# Patient Record
Sex: FEMALE | Race: WHITE | NOT HISPANIC OR LATINO | Employment: FULL TIME | URBAN - METROPOLITAN AREA
[De-identification: names, ages, dates, MRNs, and addresses within clinical notes are randomized per-mention and may not be internally consistent; named-entity substitution may affect disease eponyms.]

---

## 2017-01-16 DIAGNOSIS — E03.4 HYPOTHYROIDISM DUE TO ACQUIRED ATROPHY OF THYROID: Primary | ICD-10-CM

## 2017-01-16 NOTE — TELEPHONE ENCOUNTER
levothyroxine (SYNTHROID, LEVOTHROID) 75 MCG tablet    Last Written Prescription Date: 10/10/16  Last Quantity: 90, # refills: 0  Last Office Visit with G, P or [facility] prescribing provider: Dr. Newman, 07/14/16        TSH   Date Value Ref Range Status   07/14/2016 4.03* 0.40 - 4.00 mU/L Final

## 2017-01-17 RX ORDER — LEVOTHYROXINE SODIUM 75 UG/1
75 TABLET ORAL DAILY
Qty: 90 TABLET | Refills: 0 | Status: SHIPPED | OUTPATIENT
Start: 2017-01-17 | End: 2017-04-14

## 2017-01-21 DIAGNOSIS — E03.4 HYPOTHYROIDISM DUE TO ACQUIRED ATROPHY OF THYROID: ICD-10-CM

## 2017-01-21 LAB — TSH SERPL DL<=0.005 MIU/L-ACNC: 2.6 MU/L (ref 0.4–4)

## 2017-01-21 PROCEDURE — 84443 ASSAY THYROID STIM HORMONE: CPT | Performed by: FAMILY MEDICINE

## 2017-01-21 PROCEDURE — 36415 COLL VENOUS BLD VENIPUNCTURE: CPT | Performed by: FAMILY MEDICINE

## 2017-04-14 DIAGNOSIS — E03.4 HYPOTHYROIDISM DUE TO ACQUIRED ATROPHY OF THYROID: ICD-10-CM

## 2017-04-14 NOTE — LETTER
[facility]  [address] [place], [address]  [phone]        April 17, 2017    Steffi Klein                                                                                                                               [address] [facility] [address]            We recently received a call from your pharmacy requesting a refill of Levothyroxine.     A review of your chart indicates that an appointment is required with your provider for yearly physical-due 7/14/17. Please call the clinic at [phone] to schedule your appointment.     We have authorized one refill-3 months of your medication to allow time for you to schedule your appointment.      Taking care of your health is important to us and ongoing visits with your provider are vital to your care.  We look forward to seeing you in the near future.     Sincerely,     [facility]

## 2017-04-15 NOTE — TELEPHONE ENCOUNTER
Pending Prescriptions:                       Disp   Refills    levothyroxine (SYNTHROID/LEVOTHROID) 75 M*90 tab*0            Sig: TAKE 1 TABLET(75 MCG) BY MOUTH DAILY             Last Written Prescription Date: 1/17/2017  Last Quantity: 90, # refills: 0  Last Office Visit with CARLOS MANUEL, JASONP or [facility] prescribing provider: 7/14/2016Trent        TSH   Date Value Ref Range Status   01/21/2017 2.60 0.40 - 4.00 mU/L Final

## 2017-04-17 RX ORDER — LEVOTHYROXINE SODIUM 75 UG/1
TABLET ORAL
Qty: 90 TABLET | Refills: 0 | Status: SHIPPED | OUTPATIENT
Start: 2017-04-17 | End: 2017-07-11

## 2017-07-11 DIAGNOSIS — E03.4 HYPOTHYROIDISM DUE TO ACQUIRED ATROPHY OF THYROID: ICD-10-CM

## 2017-07-11 DIAGNOSIS — G62.9 NEUROPATHY: ICD-10-CM

## 2017-07-11 DIAGNOSIS — L71.9 ROSACEA: ICD-10-CM

## 2017-07-11 RX ORDER — GABAPENTIN 300 MG/1
CAPSULE ORAL
Qty: 270 CAPSULE | Refills: 0 | Status: SHIPPED | OUTPATIENT
Start: 2017-07-11 | End: 2017-10-05

## 2017-07-11 RX ORDER — DOXYCYCLINE 50 MG/1
CAPSULE ORAL
Qty: 90 CAPSULE | Refills: 0 | Status: SHIPPED | OUTPATIENT
Start: 2017-07-11 | End: 2017-09-02

## 2017-07-11 RX ORDER — LEVOTHYROXINE SODIUM 75 UG/1
TABLET ORAL
Qty: 90 TABLET | Refills: 0 | Status: SHIPPED | OUTPATIENT
Start: 2017-07-11 | End: 2017-10-09

## 2017-07-12 ENCOUNTER — TELEPHONE (OUTPATIENT)
Dept: FAMILY MEDICINE | Facility: CLINIC | Age: 62
End: 2017-07-12

## 2017-07-13 NOTE — TELEPHONE ENCOUNTER
Patient returned call and set up mammogram appointment.  Please send referral for colonoscopy.      Giovanna Kaur

## 2017-07-18 ENCOUNTER — OFFICE VISIT (OUTPATIENT)
Dept: FAMILY MEDICINE | Facility: CLINIC | Age: 62
End: 2017-07-18
Payer: COMMERCIAL

## 2017-07-18 VITALS
RESPIRATION RATE: 18 BRPM | BODY MASS INDEX: 36.07 KG/M2 | SYSTOLIC BLOOD PRESSURE: 104 MMHG | TEMPERATURE: 98.3 F | OXYGEN SATURATION: 96 % | HEART RATE: 64 BPM | DIASTOLIC BLOOD PRESSURE: 72 MMHG | WEIGHT: 197.2 LBS

## 2017-07-18 DIAGNOSIS — E03.4 HYPOTHYROIDISM DUE TO ACQUIRED ATROPHY OF THYROID: ICD-10-CM

## 2017-07-18 DIAGNOSIS — H93.13 TINNITUS OF BOTH EARS: ICD-10-CM

## 2017-07-18 DIAGNOSIS — Z12.11 SPECIAL SCREENING FOR MALIGNANT NEOPLASMS, COLON: ICD-10-CM

## 2017-07-18 DIAGNOSIS — Z00.00 ROUTINE GENERAL MEDICAL EXAMINATION AT A HEALTH CARE FACILITY: Primary | ICD-10-CM

## 2017-07-18 DIAGNOSIS — B35.1 ONYCHOMYCOSIS: ICD-10-CM

## 2017-07-18 DIAGNOSIS — G62.9 NEUROPATHY: ICD-10-CM

## 2017-07-18 DIAGNOSIS — E66.09 NON MORBID OBESITY DUE TO EXCESS CALORIES: ICD-10-CM

## 2017-07-18 LAB
ALBUMIN SERPL-MCNC: 3.5 G/DL (ref 3.4–5)
ALP SERPL-CCNC: 75 U/L (ref 40–150)
ALT SERPL W P-5'-P-CCNC: 38 U/L (ref 0–50)
ANION GAP SERPL CALCULATED.3IONS-SCNC: 2 MMOL/L (ref 3–14)
AST SERPL W P-5'-P-CCNC: 23 U/L (ref 0–45)
BILIRUB SERPL-MCNC: 0.4 MG/DL (ref 0.2–1.3)
BUN SERPL-MCNC: 15 MG/DL (ref 7–30)
CALCIUM SERPL-MCNC: 8.9 MG/DL (ref 8.5–10.1)
CHLORIDE SERPL-SCNC: 109 MMOL/L (ref 94–109)
CO2 SERPL-SCNC: 32 MMOL/L (ref 20–32)
CREAT SERPL-MCNC: 0.93 MG/DL (ref 0.52–1.04)
GFR SERPL CREATININE-BSD FRML MDRD: 62 ML/MIN/1.7M2
GLUCOSE SERPL-MCNC: 93 MG/DL (ref 70–99)
POTASSIUM SERPL-SCNC: 4.2 MMOL/L (ref 3.4–5.3)
PROT SERPL-MCNC: 7.2 G/DL (ref 6.8–8.8)
SODIUM SERPL-SCNC: 143 MMOL/L (ref 133–144)
TSH SERPL DL<=0.005 MIU/L-ACNC: 2.52 MU/L (ref 0.4–4)

## 2017-07-18 PROCEDURE — 99396 PREV VISIT EST AGE 40-64: CPT | Performed by: FAMILY MEDICINE

## 2017-07-18 PROCEDURE — 84443 ASSAY THYROID STIM HORMONE: CPT | Performed by: FAMILY MEDICINE

## 2017-07-18 PROCEDURE — 80053 COMPREHEN METABOLIC PANEL: CPT | Performed by: FAMILY MEDICINE

## 2017-07-18 PROCEDURE — 36415 COLL VENOUS BLD VENIPUNCTURE: CPT | Performed by: FAMILY MEDICINE

## 2017-07-18 PROCEDURE — 99213 OFFICE O/P EST LOW 20 MIN: CPT | Mod: 25 | Performed by: FAMILY MEDICINE

## 2017-07-18 PROCEDURE — 82274 ASSAY TEST FOR BLOOD FECAL: CPT | Performed by: FAMILY MEDICINE

## 2017-07-18 RX ORDER — TERBINAFINE HYDROCHLORIDE 250 MG/1
250 TABLET ORAL DAILY
Qty: 90 TABLET | Refills: 0 | Status: SHIPPED | OUTPATIENT
Start: 2017-07-18 | End: 2017-12-14

## 2017-07-18 RX ORDER — DULOXETIN HYDROCHLORIDE 30 MG/1
30 CAPSULE, DELAYED RELEASE ORAL AT BEDTIME
Qty: 90 CAPSULE | Refills: 1 | Status: SHIPPED | OUTPATIENT
Start: 2017-07-18 | End: 2017-12-14

## 2017-07-18 RX ORDER — CALCIUM CARBONATE 500(1250)
1 TABLET ORAL 2 TIMES DAILY
COMMUNITY
End: 2018-04-20

## 2017-07-18 NOTE — MR AVS SNAPSHOT
After Visit Summary   7/18/2017    Steffi Klein    MRN: [id number]           Patient Information     Date Of Birth          1955        Visit Information        Provider Department      7/18/2017 8:00 AM Raul Newman MD [facility]        Today's Diagnoses     Routine general medical examination at a health care facility    -  1    Hypothyroidism due to acquired atrophy of thyroid        Onychomycosis        Special screening for malignant neoplasms, colon        Non morbid obesity due to excess calories        Tinnitus of both ears        Neuropathy (H)          Care Instructions      Preventive Health Recommendations  Female Ages 50 - 64    Yearly exam: See your health care provider every year in order to  o Review health changes.   o Discuss preventive care.    o Review your medicines if your doctor has prescribed any.      Get a Pap test every three years (unless you have an abnormal result and your provider advises testing more often).    If you get Pap tests with HPV test, you only need to test every 5 years, unless you have an abnormal result.     You do not need a Pap test if your uterus was removed (hysterectomy) and you have not had cancer.    You should be tested each year for STDs (sexually transmitted diseases) if you're at risk.     Have a mammogram every 1 to 2 years.    Have a colonoscopy at age 50, or have a yearly FIT test (stool test). These exams screen for colon cancer.      Have a cholesterol test every 5 years, or more often if advised.    Have a diabetes test (fasting glucose) every three years. If you are at risk for diabetes, you should have this test more often.     If you are at risk for osteoporosis (brittle bone disease), think about having a bone density scan (DEXA).    Shots: Get a flu shot each year. Get a tetanus shot every 10 years.    Nutrition:     Eat at least 5 servings of fruits and vegetables each day.    Eat whole-grain bread,  whole-wheat pasta and brown rice instead of white grains and rice.    Talk to your provider about Calcium and Vitamin D.     Lifestyle    Exercise at least 150 minutes a week (30 minutes a day, 5 days a week). This will help you control your weight and prevent disease.    Limit alcohol to one drink per day.    No smoking.     Wear sunscreen to prevent skin cancer.     See your dentist every six months for an exam and cleaning.    See your eye doctor every 1 to 2 years.    30 grams fiber (3 servings vegetables /  fruits each meal)  1 serving carbohydrate (bread potatoes) daily      Famotidine (pepcid) 20 or ranitidine(zantac) 150              Follow-ups after your visit        Your next 10 appointments already scheduled     Aug 02, 2017  7:30 AM CDT   MA SCREENING DIGITAL BILATERAL with CRMA1   [facility]    [address] [place] [id number] [phone]           Do not use any powder, lotion or deodorant under your arms or on your breast. If you do, we will ask you to remove it before your exam.  Wear comfortable, two-piece clothing.  If you have any allergies, tell your care team.  Bring any previous mammograms from other facilities or have them mailed to the breast center.              Future tests that were ordered for you today     Open Future Orders        Priority Expected Expires Ordered    Fecal colorectal cancer screen (FIT) Routine 8/8/2017 10/10/2017 7/18/2017            Who to contact     If you have questions or need follow up information about today's clinic visit or your schedule please contact [facility] directly at [phone].  Normal or non-critical lab and imaging results will be communicated to you by MyChart, letter or phone within 4 business days after the clinic has received the results. If you do not hear from us within 7 days, please contact the clinic through MyChart or phone. If you have a  critical or abnormal lab result, we will notify you by phone as soon as possible.  Submit refill requests through [employer] or call your pharmacy and they will forward the refill request to us. Please allow 3 business days for your refill to be completed.          Additional Information About Your Visit        [employer] Information     [employer] gives you secure access to your electronic health record. If you see a primary care provider, you can also send messages to your care team and make appointments. If you have questions, please call your primary care clinic.  If you do not have a primary care provider, please call [phone] and they will assist you.        Care EveryWhere ID     This is your Care EveryWhere ID. This could be used by other organizations to access your [facility] medical records  [id number]        Your Vitals Were     Pulse Temperature Respirations Last Period Pulse Oximetry BMI (Body Mass Index)    64 98.3  F (36.8  C) (Oral) 18 08/13/2007 96% 36.07 kg/m2       Blood Pressure from Last 3 Encounters:   07/18/17 104/72   07/14/16 107/71   07/13/15 118/62    Weight from Last 3 Encounters:   07/18/17 197 lb 3.2 oz (89.4 kg)   07/14/16 190 lb 9.6 oz (86.5 kg)   07/13/15 187 lb (84.8 kg)              We Performed the Following     Comprehensive metabolic panel     TSH with free T4 reflex          Today's Medication Changes          These changes are accurate as of: 7/18/17  8:42 AM.  If you have any questions, ask your nurse or doctor.               Start taking these medicines.        Dose/Directions    DULoxetine 30 MG EC capsule   Commonly known as:  CYMBALTA   Used for:  Tinnitus of both ears, Neuropathy (H)   Started by:  Raul Newman MD        Dose:  30 mg   Take 1 capsule (30 mg) by mouth At Bedtime   Quantity:  90 capsule   Refills:  1       terbinafine 250 MG tablet   Commonly known as:  lamISIL   Used for:  Onychomycosis   Started by:  Raul Newman MD        Dose:  250 mg   Take 1 tablet  (250 mg) by mouth daily   Quantity:  90 tablet   Refills:  0         Stop taking these medicines if you haven't already. Please contact your care team if you have questions.     LANsoprazole 30 MG CR capsule   Commonly known as:  PREVACID   Stopped by:  Raul Newman MD                Where to get your medicines      These medications were sent to [id number] [address]  [address], [facility] [address]     Phone:  [phone]     DULoxetine 30 MG EC capsule    terbinafine 250 MG tablet                Primary Care Provider Office Phone # Fax #    Raul Newman [phone]       [facility] [address]  [facility] [address]        Equal Access to Services     [facility] CenterRYAN : Hadii aad ku hadasho Soomaali, waaxda luqadaha, qaybta kaalmada adeegyada, waxay lilianain hayaan delilah alvarado . So [facility] [phone].    ATENCIÓN: Si habla español, tiene a gonzalez disposición servicios gratuitos de asistencia lingüística. [facility] [phone].    We comply with applicable federal civil rights laws and [place] laws. We do not discriminate on the basis of race, color, national origin, age, disability sex, sexual orientation or gender identity.            Thank you!     Thank you for choosing [facility]  for your care. Our goal is always to provide you with excellent care. Hearing back from our patients is one way we can continue to improve our services. Please take a few minutes to complete the written survey that you may receive in the mail after your visit with us. Thank you!             Your Updated Medication List - Protect others around you: Learn how to safely use, store and throw away your medicines at www.disposemymeds.org.          This list is accurate as of: 7/18/17  8:42 AM.  Always use your most recent med list.                   Brand Name Dispense Instructions for  use Diagnosis    calcium carbonate 1250 MG tablet    OS- mg [place]. Ca     Take 1 tablet by mouth 2 times daily        CERAVE Lotn      Externally apply  topically.        doxycycline Monohydrate 50 MG Caps capsule     90 capsule    TAKE 1 TO 2 CAPSULES BY MOUTH DAILY AS NEEDED    Rosacea       DULoxetine 30 MG EC capsule    CYMBALTA    90 capsule    Take 1 capsule (30 mg) by mouth At Bedtime    Tinnitus of both ears, Neuropathy (H)       gabapentin 300 MG capsule    NEURONTIN    270 capsule    TAKE 1 CAPSULE(300 MG) BY MOUTH THREE TIMES DAILY    Neuropathy (H)       levothyroxine 75 MCG tablet    SYNTHROID/LEVOTHROID    90 tablet    TAKE 1 TABLET(75 MCG) BY MOUTH DAILY    Hypothyroidism due to acquired atrophy of thyroid       pantoprazole 40 MG EC tablet    PROTONIX    90 tablet    Take 1 tablet (40 mg) by mouth daily Take 30-60 minutes before a meal.    Gastroesophageal reflux disease, esophagitis presence not specified       terbinafine 250 MG tablet    lamISIL    90 tablet    Take 1 tablet (250 mg) by mouth daily    Onychomycosis       vitamin D 1000 UNITS capsule     100 capsule    Take 1 capsule by mouth daily.        ZYRTEC 10 MG tablet   Generic drug:  cetirizine     90    ONE TABLET DAILY    Cough

## 2017-07-18 NOTE — NURSING NOTE
"Chief Complaint   Patient presents with     Physical     pt is fasting       Initial /72 (BP Location: Right arm, Patient Position: Chair, Cuff Size: Adult Regular)  Pulse 64  Temp 98.3  F (36.8  C) (Oral)  Resp 18  Wt 197 lb 3.2 oz (89.4 kg)  LMP 08/13/2007  SpO2 96%  BMI 36.07 kg/m2 Estimated body mass index is 36.07 kg/(m^2) as calculated from the following:    Height as of 7/14/16: 5' 2\" (1.575 m).    Weight as of this encounter: 197 lb 3.2 oz (89.4 kg).  Medication Reconciliation: complete   SMA Joy      "

## 2017-07-18 NOTE — PROGRESS NOTES
Tinnitus of both ears disappears on vacation  Neuropathy (H) tingling right toes, worse when gabapentin forgotten  ROS hi stress  /72 (BP Location: Right arm, Patient Position: Chair, Cuff Size: Adult Regular)  Pulse 64  Temp 98.3  F (36.8  C) (Oral)  Resp 18  Wt 197 lb 3.2 oz (89.4 kg)  LMP 08/13/2007  SpO2 96%  BMI 36.07 kg/m2  Tr edema foot  Ears clear    ASSESSMENT / PLAN:    (H93.13) Tinnitus of both ears  Comment: discussed options, jyotsna history, noise protection  Plan: DULoxetine (CYMBALTA) 30 MG EC capsule       Trial     (G62.9) Neuropathy (H)  Comment: add  Plan: Comprehensive metabolic panel, DULoxetine         (CYMBALTA) 30 MG EC capsule        2 birds one stone      RTC 4-8 weeks    Raul Newman MD

## 2017-07-18 NOTE — PATIENT INSTRUCTIONS

## 2017-07-18 NOTE — PROGRESS NOTES
SUBJECTIVE:   CC: Steffi Klein is an 62 year old woman who presents for preventive health visit.     Healthy Habits:    Do you get at least three servings of calcium containing foods daily (dairy, green leafy vegetables, etc.)? no    Amount of exercise or daily activities, outside of work: 1-2 day(s) per week    Problems taking medications regularly No    Medication side effects: No    Have you had an eye exam in the past two years? yes    Do you see a dentist twice per year? yes  Do you have sleep apnea, excessive snoring or daytime drowsiness?  yes      Routine general medical examination at a health care facility  (primary encounter diagnosis): Patient has a mammogram scheduled (July 31)    Hypothyroidism due to acquired atrophy of thyroid:  TSH   Date Value Ref Range Status   01/21/2017 2.60 0.40 - 4.00 mU/L Final   ]    Onychomycosis: The patient was wondering if she had a nail fungus on her right big toe.     Special screening for malignant neoplasms, colon: The patient is aware she is due for colon cancer screen, agreed to a FIT    Non morbid obesity due to excess calories:   Wt Readings from Last 5 Encounters:   07/18/17 89.4 kg (197 lb 3.2 oz)   07/14/16 86.5 kg (190 lb 9.6 oz)   07/13/15 84.8 kg (187 lb)   07/09/14 84.4 kg (186 lb)   06/30/14 84.4 kg (186 lb)       Today's PHQ-2 Score:   PHQ-2 ( 1999 Pfizer) 7/13/2016 7/13/2015   Q1: Little interest or pleasure in doing things - 0   Q2: Feeling down, depressed or hopeless - 0   PHQ-2 Score - 0   Q1: Little interest or pleasure in doing things Not at all -   Q2: Feeling down, depressed or hopeless Not at all -   PHQ-2 Score 0 -       Abuse: Current or Past(Physical, Sexual or Emotional)- No  Do you feel safe in your environment - Yes    Social History   Substance Use Topics     Smoking status: Former Smoker     Smokeless tobacco: Never Used      Comment: quit 1970's     Alcohol use Yes      Comment: socially     The patient does not drink >3 drinks  per day nor >7 drinks per week.    Reviewed orders with patient.  Reviewed health maintenance and updated orders accordingly - Yes      Patient over age 50, mutual decision to screen reflected in health maintenance.    Pertinent mammograms are reviewed under the imaging tab.  History of abnormal Pap smear: NO - age 30- 65 PAP every 3 years recommended    Reviewed and updated as needed this visit by clinical staff  Past Medical History:   Diagnosis Date     Anxiety 2009     CARDIOVASCULAR SCREENING; LDL GOAL LESS THAN 160 10/31/2010     Chronic pain     left wrist     Distal radius fracture 2013     Esophageal reflux 2006     Hypothyroidism 2014     Irritable bowel syndrome      Lumbago      Neuropathy (H) 2014    R foot post op      Obesity 2015     Osteoporosis      Reflux esophagitis      Restless legs syndrome (RLS)      Rosacea 2015     Squamous cell carcinoma of shoulder, left      Wrist pain 2013       Past Surgical History:   Procedure Laterality Date     C NONSPECIFIC PROCEDURE      3 NSVDs     D & C       ESOPHAGOSCOPY, GASTROSCOPY, DUODENOSCOPY (EGD), COMBINED  10/24/2014     [facility]     ESOPHAGOSCOPY, GASTROSCOPY, DUODENOSCOPY (EGD), COMBINED N/A 10/24/2014    Procedure: COMBINED ESOPHAGOSCOPY, GASTROSCOPY, DUODENOSCOPY (EGD);  Surgeon: Black Alva MD;  Location:  GI     OPEN REDUCTION INTERNAL FIXATION WRIST  12/3/2012    Procedure: OPEN REDUCTION INTERNAL FIXATION WRIST;  Left Wrist Open Reduction Internal Fixation        ORTHOPEDIC SURGERY      right ankle surgery     skin cancer excision       TONSILLECTOMY  as a child        Family History   Problem Relation Age of Onset     CANCER Father      lung and brain cancer,  at age of 69     CANCER Mother      lung cancer, surgically resected     Arthritis Mother      has had knee surgeries     Alzheimer Disease Mother      CANCER Paternal Aunt       from breast CA     Alzheimer Disease  Maternal Grandfather        Social History   Substance Use Topics     Smoking status: Former Smoker     Smokeless tobacco: Never Used      Comment: quit 1970's     Alcohol use Yes      Comment: socially       Tobacco  Allergies  Med Hx  Surg Hx  Fam Hx  Soc Hx        Reviewed and updated as needed this visit by Provider    ROS:  C: NEGATIVE for fever, chills  I: NEGATIVE for worrisome rashes, moles or lesions  E: NEGATIVE for vision changes or irritation  ENT: see additional note  R: NEGATIVE for significant cough or SOB  CV: NEGATIVE for chest pain, palpitations or peripheral edema  GI: NEGATIVE for nausea, abdominal pain, heartburn, or change in bowel habits  M: NEGATIVE for significant arthralgias or myalgia exceptright  ankle bothers her  E: NEGATIVE for hot flashes  N: see additional note      This document serves as a record of the services and decisions personally performed and made by Raul Newman MD. It was created on his behalf by Kym Estrada, a trained medical scribe.  The creation of this document is based on the scribe's personal observations and the provider's statements to the medical scribe.  Kym Estrada, July 18, 2017 8:08 AM    OBJECTIVE:   /72 (BP Location: Right arm, Patient Position: Chair, Cuff Size: Adult Regular)  Pulse 64  Temp 98.3  F (36.8  C) (Oral)  Resp 18  Wt 197 lb 3.2 oz (89.4 kg)  LMP 08/13/2007  SpO2 96%  BMI 36.07 kg/m2  EXAM:  GENERAL APPEARANCE: healthy, alert and no distress  EYES: Eyes grossly normal to inspection, PERRL and conjunctivae and sclerae normal  HENT:  nose and mouth without ulcers or lesions, oropharynx clear and oral mucous membranes moist see additional note  NECK: no adenopathy, no asymmetry, masses, or scars and thyroid normal to palpation  RESP: lungs clear to auscultation - no rales, rhonchi or wheezes  BREAST: normal without masses, tenderness or nipple discharge and no palpable axillary masses or adenopathy  CV: regular rate and  "rhythm, normal S1 S2, no S3 or S4, no murmur, click or rub, no peripheral edema and peripheral pulses strong  ABDOMEN: soft, nontender, no hepatosplenomegaly, no masses and bowel sounds normal  MS: no musculoskeletal defects are noted and gait is age appropriate without ataxia Tr edema right does not appear to be synovitis  SKIN: no suspicious lesions or rashes nail fungal infection r hallux major    NEURO: see additional note  PSYCH: mentation appears normal and affect normal/bright    ASSESSMENT/PLAN:     ASSESSMENT / PLAN:  (Z00.00) Routine general medical examination at a health care facility  (primary encounter diagnosis)  Comment: Return annually for px      (E03.4) Hypothyroidism due to acquired atrophy of thyroid  Comment: assess  Plan: TSH with free T4 reflex            (B35.1) Onychomycosis  Comment: Treat  Plan: Comprehensive metabolic panel, terbinafine         (LAMISIL) 250 MG tablet           (Z12.11) Special screening for malignant neoplasms, colon  Comment: Agreed to receive  Plan: Fecal colorectal cancer screen (FIT)            (E66.09) Non morbid obesity due to excess calories  Comment: Discussed healthy diet    Knee high sox  RTC 4-8 weeks    COUNSELING:   Reviewed preventive health counseling, as reflected in patient instructions       Healthy diet/nutrition       Colon cancer screening     reports that she has quit smoking. She has never used smokeless tobacco.    Estimated body mass index is 36.07 kg/(m^2) as calculated from the following:    Height as of 7/14/16: 5' 2\" (1.575 m).    Weight as of this encounter: 197 lb 3.2 oz (89.4 kg).   Weight management plan: Discuss diet- high veggie, low carb    Counseling Resources:  ATP IV Guidelines  Pooled Cohorts Equation Calculator  Breast Cancer Risk Calculator  FRAX Risk Assessment  ICSI Preventive Guidelines  Dietary Guidelines for Americans, 2010  USDA's MyPlate  ASA Prophylaxis  Lung CA Screening    The information in this document, created by " the medical scribe for me, accurately reflects the services I personally performed and the decisions made by me. I have reviewed and approved this document for accuracy prior to leaving the patient care area.  Raul Newman MD July 18, 2017 8:08 AM    Raul Newman MD  [facility]

## 2017-07-23 LAB — HEMOCCULT STL QL IA: NEGATIVE

## 2017-07-24 ENCOUNTER — APPOINTMENT (OUTPATIENT)
Dept: LAB | Facility: CLINIC | Age: 62
End: 2017-07-24
Attending: PATHOLOGY
Payer: COMMERCIAL

## 2017-08-02 ENCOUNTER — RADIANT APPOINTMENT (OUTPATIENT)
Dept: MAMMOGRAPHY | Facility: CLINIC | Age: 62
End: 2017-08-02
Payer: COMMERCIAL

## 2017-08-02 DIAGNOSIS — Z12.31 VISIT FOR SCREENING MAMMOGRAM: ICD-10-CM

## 2017-08-02 PROCEDURE — G0202 SCR MAMMO BI INCL CAD: HCPCS | Mod: TC

## 2017-09-29 DIAGNOSIS — K21.9 GASTROESOPHAGEAL REFLUX DISEASE, ESOPHAGITIS PRESENCE NOT SPECIFIED: ICD-10-CM

## 2017-09-29 RX ORDER — PANTOPRAZOLE SODIUM 40 MG/1
TABLET, DELAYED RELEASE ORAL
Qty: 90 TABLET | Refills: 2 | Status: SHIPPED | OUTPATIENT
Start: 2017-09-29 | End: 2018-06-18

## 2017-09-29 NOTE — TELEPHONE ENCOUNTER
PANTOPRAZOLE 40MG TABLETS        Last Written Prescription Date: 07/14/16  Last Fill Quantity: 90,  # refills: 3   Last Office Visit with G, P or [facility] prescribing provider: 07/18/17 Dr Newman

## 2017-10-05 DIAGNOSIS — G62.9 NEUROPATHY: ICD-10-CM

## 2017-10-05 RX ORDER — GABAPENTIN 300 MG/1
CAPSULE ORAL
Qty: 270 CAPSULE | Refills: 0 | Status: SHIPPED | OUTPATIENT
Start: 2017-10-05 | End: 2017-12-14

## 2017-10-05 NOTE — TELEPHONE ENCOUNTER
Routing refill request to provider for review/approval because:  Drug not on the [facility] refill protocol     Brittany Cannon RN, BS  Clinical Nurse Triage.

## 2017-10-09 DIAGNOSIS — E03.4 HYPOTHYROIDISM DUE TO ACQUIRED ATROPHY OF THYROID: ICD-10-CM

## 2017-10-09 NOTE — TELEPHONE ENCOUNTER
LEVOTHYROXINE 0.075MG (75MCG) TABS       /Last Written Prescription Date 07/11/17  Last Quantity: 90, # refills: 0  Last Office Visit with [facility] or [facility] prescribing provider: 07/18/17 DR Newman         TSH   Date Value Ref Range Status   07/18/2017 2.52 0.40 - 4.00 mU/L Final

## 2017-10-10 RX ORDER — LEVOTHYROXINE SODIUM 75 UG/1
TABLET ORAL
Qty: 90 TABLET | Refills: 2 | Status: SHIPPED | OUTPATIENT
Start: 2017-10-10 | End: 2018-07-09

## 2017-10-10 NOTE — TELEPHONE ENCOUNTER
Routing refill request to provider for review/approval because:  Was to f/u in 4-8 week after July visit  Did you need to recheck labs?    Brittany Cannon RN, BS  Clinical Nurse Triage.

## 2017-11-04 ENCOUNTER — ALLIED HEALTH/NURSE VISIT (OUTPATIENT)
Dept: NURSING | Facility: CLINIC | Age: 62
End: 2017-11-04
Payer: COMMERCIAL

## 2017-11-04 DIAGNOSIS — Z23 NEED FOR PROPHYLACTIC VACCINATION AND INOCULATION AGAINST INFLUENZA: Primary | ICD-10-CM

## 2017-11-04 PROCEDURE — 90471 IMMUNIZATION ADMIN: CPT

## 2017-11-04 PROCEDURE — 90686 IIV4 VACC NO PRSV 0.5 ML IM: CPT

## 2017-11-04 PROCEDURE — 99207 ZZC NO CHARGE NURSE ONLY: CPT

## 2017-11-04 NOTE — PROGRESS NOTES

## 2017-11-04 NOTE — MR AVS SNAPSHOT
After Visit Summary   11/4/2017    Steffi Klein    MRN: [id number]           Patient Information     Date Of Birth          1955        Visit Information        Provider Department      11/4/2017 9:15 AM HAL MONTAGUE MA/[facility]        Today's Diagnoses     Need for prophylactic vaccination and inoculation against influenza    -  1       Follow-ups after your visit        Who to contact     If you have questions or need follow up information about today's clinic visit or your schedule please contact [facility] directly at [phone].  Normal or non-critical lab and imaging results will be communicated to you by Celenohart, letter or phone within 4 business days after the clinic has received the results. If you do not hear from us within 7 days, please contact the clinic through Lingua.lyt or phone. If you have a critical or abnormal lab result, we will notify you by phone as soon as possible.  Submit refill requests through Genwords or call your pharmacy and they will forward the refill request to us. Please allow 3 business days for your refill to be completed.          Additional Information About Your Visit        MyChart Information     Genwords gives you secure access to your electronic health record. If you see a primary care provider, you can also send messages to your care team and make appointments. If you have questions, please call your primary care clinic.  If you do not have a primary care provider, please call [phone] and they will assist you.        Care EveryWhere ID     This is your Care EveryWhere ID. This could be used by other organizations to access your [place] medical records  [id number]        Your Vitals Were     Last Period                   08/13/2007            Blood Pressure from Last 3 Encounters:   07/18/17 104/72   07/14/16 107/71   07/13/15 118/62    Weight from Last 3 Encounters:   07/18/17 197 lb 3.2 oz (89.4 kg)    07/14/16 190 lb 9.6 oz (86.5 kg)   07/13/15 187 lb (84.8 kg)              We Performed the Following     FLU VAC, SPLIT VIRUS IM > 3 YO (QUADRIVALENT) [45093]     Vaccine Administration, Initial [37062]        Primary Care Provider Office Phone # Fax #    Raul Newman [phone] [address] [facility] [address]        Equal Access to Services     [facility] Center[facility] : Hadii aad ku hadasho Soomaali, waaxda luqadaha, qaybta kaalmada adeegyada, waxay idiin hayaan adeeg kharash la'umeshn . So [facility] [phone].    ATENCIÓN: Si habla español, tiene a gonzalez disposición servicios gratuitos de asistencia lingüística. [facility] [phone].    We comply with applicable federal civil rights laws and [place] laws. We do not discriminate on the basis of race, color, national origin, age, disability, sex, sexual orientation, or gender identity.            Thank you!     Thank you for choosing [facility]  for your care. Our goal is always to provide you with excellent care. Hearing back from our patients is one way we can continue to improve our services. Please take a few minutes to complete the written survey that you may receive in the mail after your visit with us. Thank you!             Your Updated Medication List - Protect others around you: Learn how to safely use, store and throw away your medicines at www.disposemymeds.org.          This list is accurate as of: 11/4/17  9:25 AM.  Always use your most recent med list.                   Brand Name Dispense Instructions for use Diagnosis    calcium carbonate 1250 MG tablet    OS- mg Cahto. Ca     Take 1 tablet by mouth 2 times daily        SANJANA Mujica      Externally apply  topically.        doxycycline monohydrate 50 MG capsule     90 capsule    TAKE 1 TO 2 CAPSULES BY MOUTH DAILY AS NEEDED    Rosacea       DULoxetine 30 MG EC capsule    CYMBALTA    90 capsule    Take 1 capsule (30 mg) by mouth At Bedtime    Tinnitus of  both ears, Neuropathy       gabapentin 300 MG capsule    NEURONTIN    270 capsule    TAKE 1 CAPSULE(300 MG) BY MOUTH THREE TIMES DAILY    Neuropathy       levothyroxine 75 MCG tablet    SYNTHROID/LEVOTHROID    90 tablet    TAKE 1 TABLET(75 MCG) BY MOUTH DAILY    Hypothyroidism due to acquired atrophy of thyroid       pantoprazole 40 MG EC tablet    PROTONIX    90 tablet    TAKE 1 TABLET BY MOUTH DAILY, 30-60 BEFORE A MEAL    Gastroesophageal reflux disease, esophagitis presence not specified       terbinafine 250 MG tablet    lamISIL    90 tablet    Take 1 tablet (250 mg) by mouth daily    Onychomycosis       vitamin D 1000 UNITS capsule     100 capsule    Take 1 capsule by mouth daily.        ZYRTEC 10 MG tablet   Generic drug:  cetirizine     90    ONE TABLET DAILY    Cough

## 2017-12-14 ENCOUNTER — OFFICE VISIT (OUTPATIENT)
Dept: FAMILY MEDICINE | Facility: CLINIC | Age: 62
End: 2017-12-14
Payer: COMMERCIAL

## 2017-12-14 VITALS
HEIGHT: 62 IN | SYSTOLIC BLOOD PRESSURE: 127 MMHG | RESPIRATION RATE: 14 BRPM | DIASTOLIC BLOOD PRESSURE: 80 MMHG | TEMPERATURE: 98.3 F | BODY MASS INDEX: 32.57 KG/M2 | WEIGHT: 177 LBS | HEART RATE: 70 BPM

## 2017-12-14 DIAGNOSIS — H93.13 TINNITUS OF BOTH EARS: ICD-10-CM

## 2017-12-14 DIAGNOSIS — B35.1 ONYCHOMYCOSIS: ICD-10-CM

## 2017-12-14 DIAGNOSIS — K21.9 GASTROESOPHAGEAL REFLUX DISEASE, ESOPHAGITIS PRESENCE NOT SPECIFIED: ICD-10-CM

## 2017-12-14 DIAGNOSIS — G62.9 NEUROPATHY: Primary | ICD-10-CM

## 2017-12-14 PROCEDURE — 99214 OFFICE O/P EST MOD 30 MIN: CPT | Performed by: FAMILY MEDICINE

## 2017-12-14 RX ORDER — DULOXETIN HYDROCHLORIDE 60 MG/1
60 CAPSULE, DELAYED RELEASE ORAL DAILY
Qty: 90 CAPSULE | Refills: 1 | Status: SHIPPED | OUTPATIENT
Start: 2017-12-14 | End: 2018-04-20

## 2017-12-14 RX ORDER — GABAPENTIN 300 MG/1
CAPSULE ORAL
Qty: 270 CAPSULE | Refills: 1 | Status: SHIPPED | OUTPATIENT
Start: 2017-12-14 | End: 2018-07-09

## 2017-12-14 NOTE — MR AVS SNAPSHOT
"              After Visit Summary   12/14/2017    Steffi Klein    MRN: [id number]           Patient Information     Date Of Birth          1955        Visit Information        Provider Department      12/14/2017 9:00 AM Raul Newman MD [facility]        Today's Diagnoses     Neuropathy        Tinnitus of both ears          Care Instructions    Famotidine (pepcid) 20 or ranitidine(zantac) 150 daily, protonix as needed            Follow-ups after your visit        Who to contact     If you have questions or need follow up information about today's clinic visit or your schedule please contact [facility] directly at [phone].  Normal or non-critical lab and imaging results will be communicated to you by MyChart, letter or phone within 4 business days after the clinic has received the results. If you do not hear from us within 7 days, please contact the clinic through Pay-Mehart or phone. If you have a critical or abnormal lab result, we will notify you by phone as soon as possible.  Submit refill requests through Moz or call your pharmacy and they will forward the refill request to us. Please allow 3 business days for your refill to be completed.          Additional Information About Your Visit        MyChart Information     Moz gives you secure access to your electronic health record. If you see a primary care provider, you can also send messages to your care team and make appointments. If you have questions, please call your primary care clinic.  If you do not have a primary care provider, please call [phone] and they will assist you.        Care EveryWhere ID     This is your Care EveryWhere ID. This could be used by other organizations to access your [place] medical records  [id number]        Your Vitals Were     Pulse Temperature Respirations Height Last Period Breastfeeding?    70 98.3  F (36.8  C) (Oral) 14 5' 2\" (1.575 m) 08/13/2007 No    BMI " (Body Mass Index)                   32.37 kg/m2            Blood Pressure from Last 3 Encounters:   12/14/17 127/80   07/18/17 104/72   07/14/16 107/71    Weight from Last 3 Encounters:   12/14/17 177 lb (80.3 kg)   07/18/17 197 lb 3.2 oz (89.4 kg)   07/14/16 190 lb 9.6 oz (86.5 kg)              Today, you had the following     No orders found for display         Today's Medication Changes          These changes are accurate as of: 12/14/17  9:42 AM.  If you have any questions, ask your nurse or doctor.               These medicines have changed or have updated prescriptions.        Dose/Directions    DULoxetine 60 MG EC capsule   Commonly known as:  CYMBALTA   This may have changed:    - medication strength  - how much to take  - when to take this   Used for:  Tinnitus of both ears, Neuropathy   Changed by:  Raul Newman MD        Dose:  60 mg   Take 1 capsule (60 mg) by mouth daily   Quantity:  90 capsule   Refills:  1       gabapentin 300 MG capsule   Commonly known as:  NEURONTIN   This may have changed:  See the new instructions.   Used for:  Neuropathy   Changed by:  Raul Newman MD        TAKE 1 CAPSULE(300 MG) BY MOUTH THREE TIMES DAILY   Quantity:  270 capsule   Refills:  1         Stop taking these medicines if you haven't already. Please contact your care team if you have questions.     terbinafine 250 MG tablet   Commonly known as:  lamISIL   Stopped by:  Raul Newman MD                Where to get your medicines      These medications were sent to [id number] [address]  [address]  [address], [facility] [address]     Phone:  [phone]     DULoxetine 60 MG EC capsule    gabapentin 300 MG capsule                Primary Care Provider Office Phone # Fax #    Raul Newman [phone] [address]  [facility] [address]        Equal Access to Services     KWESI OCONNOR AH: Basim murphy  Cain, gurpreetda luchachoadaha, qamatteota kachava branham, akua lilianain hayaan ramseylayne kodywilda laMaria Guadalupeluis misael. So [facility] [phone].    ATENCIÓN: Si leida ware, tiene a gonzalez disposición servicios gratuitos de asistencia lingüística. [facility] al [phone].    We comply with applicable federal civil rights laws and [place] laws. We do not discriminate on the basis of race, color, national origin, age, disability, sex, sexual orientation, or gender identity.            Thank you!     Thank you for choosing [facility]  for your care. Our goal is always to provide you with excellent care. Hearing back from our patients is one way we can continue to improve our services. Please take a few minutes to complete the written survey that you may receive in the mail after your visit with us. Thank you!             Your Updated Medication List - Protect others around you: Learn how to safely use, store and throw away your medicines at www.disposemymeds.org.          This list is accurate as of: 12/14/17  9:42 AM.  Always use your most recent med list.                   Brand Name Dispense Instructions for use Diagnosis    calcium carbonate 1250 MG tablet    OS- mg Nikolski. Ca     Take 1 tablet by mouth 2 times daily        CERAVE Lotn      Externally apply  topically.        doxycycline monohydrate 50 MG capsule     90 capsule    TAKE 1 TO 2 CAPSULES BY MOUTH DAILY AS NEEDED    Rosacea       DULoxetine 60 MG EC capsule    CYMBALTA    90 capsule    Take 1 capsule (60 mg) by mouth daily    Tinnitus of both ears, Neuropathy       gabapentin 300 MG capsule    NEURONTIN    270 capsule    TAKE 1 CAPSULE(300 MG) BY MOUTH THREE TIMES DAILY    Neuropathy       levothyroxine 75 MCG tablet    SYNTHROID/LEVOTHROID    90 tablet    TAKE 1 TABLET(75 MCG) BY MOUTH DAILY    Hypothyroidism due to acquired atrophy of thyroid       pantoprazole 40 MG EC tablet    PROTONIX    90 tablet    TAKE 1 TABLET BY MOUTH DAILY, 30-60 BEFORE A MEAL     Gastroesophageal reflux disease, esophagitis presence not specified       vitamin D 1000 UNITS capsule     100 capsule    Take 1 capsule by mouth daily.        ZYRTEC 10 MG tablet   Generic drug:  cetirizine     90    ONE TABLET DAILY    Cough

## 2017-12-14 NOTE — NURSING NOTE
"Chief Complaint   Patient presents with     Recheck Medication       Initial /80 (BP Location: Left arm, Patient Position: Chair, Cuff Size: Adult Large)  Pulse 70  Temp 98.3  F (36.8  C) (Oral)  Resp 14  Ht 5' 2\" (1.575 m)  Wt 177 lb (80.3 kg)  LMP 08/13/2007  Breastfeeding? No  BMI 32.37 kg/m2 Estimated body mass index is 32.37 kg/(m^2) as calculated from the following:    Height as of this encounter: 5' 2\" (1.575 m).    Weight as of this encounter: 177 lb (80.3 kg).  Medication Reconciliation: complete rt arm Alba Fishman MA      "

## 2017-12-14 NOTE — PROGRESS NOTES
SUBJECTIVE:   Steffi Klein is a 62 year old female who presents to clinic today for the following health issues:      Medication Followup of     Taking Medication as prescribed: yes    Side Effects:  None    Medication Helping Symptoms:  yes     Neuropathy: Patient states her feet no longer bother her that much with duloxetine. She noticed that if she forgets to take her gabapentin it is not as severe as before duloxetine    Tinnitus of both : Patient states the duloxetine has helped somewhat but the tinnitus still bothers occasionally. She isn't currently interested in a hearing aid.     Problem list and histories reviewed & adjusted, as indicated.  Additional history: none    Reviewed and updated as needed this visit by clinical staffTobacco  Allergies  Meds  Med Hx  Surg Hx  Fam Hx  Soc Hx       Past Medical History:   Diagnosis Date     Anxiety 12/28/2009     CARDIOVASCULAR SCREENING; LDL GOAL LESS THAN 160 10/31/2010     Chronic pain     left wrist     Distal radius fracture 1/28/2013     Esophageal reflux 7/2/2006     Hypothyroidism 7/2/2014     Irritable bowel syndrome      Lumbago      Neuropathy 6/30/2014    R foot post op      Non morbid obesity due to excess calories 7/18/2017     Obesity 7/13/2015     Osteoporosis 2012     Reflux esophagitis      Restless legs syndrome (RLS)      Rosacea 7/13/2015     Squamous cell carcinoma of shoulder, left      Wrist pain 1/28/2013       Past Surgical History:   Procedure Laterality Date     C NONSPECIFIC PROCEDURE      3 NSVDs     D & C       ESOPHAGOSCOPY, GASTROSCOPY, DUODENOSCOPY (EGD), COMBINED  10/24/2014     [facility]     ESOPHAGOSCOPY, GASTROSCOPY, DUODENOSCOPY (EGD), COMBINED N/A 10/24/2014    Procedure: COMBINED ESOPHAGOSCOPY, GASTROSCOPY, DUODENOSCOPY (EGD);  Surgeon: Black Alva MD;  Location:  GI     OPEN REDUCTION INTERNAL FIXATION WRIST  12/3/2012    Procedure: OPEN REDUCTION INTERNAL FIXATION WRIST;  Left Wrist Open Reduction  "Internal Fixation        ORTHOPEDIC SURGERY      right ankle surgery     skin cancer excision       TONSILLECTOMY  as a child        Family History   Problem Relation Age of Onset     CANCER Father      lung and brain cancer,  at age of 69     CANCER Mother      lung cancer, surgically resected     Arthritis Mother      has had knee surgeries     Alzheimer Disease Mother      CANCER Paternal Aunt       from breast CA     Alzheimer Disease Maternal Grandfather        Social History   Substance Use Topics     Smoking status: Former Smoker     Smokeless tobacco: Never Used      Comment: quit 1970's     Alcohol use Yes      Comment: socially       Reviewed and updated as needed this visit by Provider         ROS:  Great mood, volitional weight loss. She has finished her terbinafine and wonders about continuing    This document serves as a record of the services and decisions personally performed and made by Raul Newman MD. It was created on his behalf by Kym Estrada, a trained medical scribe.  The creation of this document is based on the scribe's personal observations and the provider's statements to the medical scribe.  Kym Estrada, 2017 9:24 AM    OBJECTIVE:     /80 (BP Location: Left arm, Patient Position: Chair, Cuff Size: Adult Large)  Pulse 70  Temp 98.3  F (36.8  C) (Oral)  Resp 14  Ht 5' 2\" (1.575 m)  Wt 177 lb (80.3 kg)  LMP 2007  Breastfeeding? No  BMI 32.37 kg/m2  Body mass index is 32.37 kg/(m^2).    GEN: Healthy, alert, no acute distress  FOOT EXAM: Feet are warm and dry. Pedal pulses palpable. No LE edema. Skin intact. Onychomycosis growing out      ASSESSMENT/PLAN:   ASSESSMENT / PLAN:  (G62.9) Neuropathy  (primary encounter diagnosis)  Comment: Increased duloxetine dose  Plan: gabapentin (NEURONTIN) 300 MG capsule,         DULoxetine (CYMBALTA) 60 MG EC capsule            (H93.13) Tinnitus of both ears  Comment: Increased duloxetine dose  Plan: DULoxetine " (CYMBALTA) 60 MG EC capsule            (B35.1) Onychomycosis  Comment: Discussed natural history.  Plan: Now status post therapy, observe up to 2 years    (K21.9) Gastroesophageal reflux disease, esophagitis presence not specified  Comment: Discussed with her and attempt to eliminate or reduce her proton pump inhibitor  Plan:       RTC 6 months if well    Raul Newman MD      The information in this document, created by the medical scribe for me, accurately reflects the services I personally performed and the decisions made by me. I have reviewed and approved this document for accuracy prior to leaving the patient care area.  Raul Newman MD December 14, 2017 9:24 AM    Raul Newman MD  [facility]

## 2018-02-13 DIAGNOSIS — H93.13 TINNITUS OF BOTH EARS: ICD-10-CM

## 2018-02-13 DIAGNOSIS — G62.9 NEUROPATHY: ICD-10-CM

## 2018-02-13 NOTE — TELEPHONE ENCOUNTER
"Requested Prescriptions   Pending Prescriptions Disp Refills     DULoxetine (CYMBALTA) 30 MG EC capsule [Pharmacy Med Name: DULOXETINE DR 30MG CAPSULES] 90 capsule 0    Last Written Prescription Date:  12/14/2017  Last Fill Quantity: 90 CAPSULE,  # refills: 1   Last office visit: 12/14/2017 with prescribing provider:  12/14/2017   Future Office Visit:     Sig: TAKE 1 CAPSULE(30 MG) BY MOUTH AT BEDTIME    Serotonin-Norepinephrine Reuptake Inhibitors  Passed    2/13/2018  6:12 AM       Passed - Blood pressure under 140/90    BP Readings from Last 3 Encounters:   12/14/17 127/80   07/18/17 104/72   07/14/16 107/71                Passed - Recent or future visit with authorizing provider's specialty    Patient had office visit in the last year or has a visit in the next 30 days with authorizing provider.  See \"Patient Info\" tab in inbasket, or \"Choose Columns\" in Meds & Orders section of the refill encounter.            Passed - Patient is age 18 or older       Passed - No active pregnancy on record       Passed - No positive pregnancy test in past 12 months          Ranjan BOATENGT  "

## 2018-02-14 RX ORDER — DULOXETIN HYDROCHLORIDE 30 MG/1
CAPSULE, DELAYED RELEASE ORAL
Status: SHIPPED
Start: 2018-02-14 | End: 2018-04-20

## 2018-04-20 ENCOUNTER — OFFICE VISIT (OUTPATIENT)
Dept: FAMILY MEDICINE | Facility: CLINIC | Age: 63
End: 2018-04-20
Payer: COMMERCIAL

## 2018-04-20 VITALS
WEIGHT: 179.7 LBS | RESPIRATION RATE: 16 BRPM | SYSTOLIC BLOOD PRESSURE: 98 MMHG | DIASTOLIC BLOOD PRESSURE: 62 MMHG | HEIGHT: 62 IN | HEART RATE: 64 BPM | BODY MASS INDEX: 33.07 KG/M2 | OXYGEN SATURATION: 96 % | TEMPERATURE: 98.2 F

## 2018-04-20 DIAGNOSIS — I95.9 HYPOTENSION, UNSPECIFIED HYPOTENSION TYPE: ICD-10-CM

## 2018-04-20 DIAGNOSIS — H93.13 TINNITUS OF BOTH EARS: ICD-10-CM

## 2018-04-20 DIAGNOSIS — G62.9 NEUROPATHY: ICD-10-CM

## 2018-04-20 DIAGNOSIS — G47.10 HYPERSOMNIA: Primary | ICD-10-CM

## 2018-04-20 DIAGNOSIS — E03.4 HYPOTHYROIDISM DUE TO ACQUIRED ATROPHY OF THYROID: ICD-10-CM

## 2018-04-20 LAB
BASOPHILS # BLD AUTO: 0 10E9/L (ref 0–0.2)
BASOPHILS NFR BLD AUTO: 0.4 %
DIFFERENTIAL METHOD BLD: ABNORMAL
EOSINOPHIL # BLD AUTO: 0.2 10E9/L (ref 0–0.7)
EOSINOPHIL NFR BLD AUTO: 3.3 %
ERYTHROCYTE [DISTWIDTH] IN BLOOD BY AUTOMATED COUNT: 13.5 % (ref 10–15)
HCT VFR BLD AUTO: 40.6 % (ref 35–47)
HGB BLD-MCNC: 12.8 G/DL (ref 11.7–15.7)
LYMPHOCYTES # BLD AUTO: 2.6 10E9/L (ref 0.8–5.3)
LYMPHOCYTES NFR BLD AUTO: 35.1 %
MCH RBC QN AUTO: 32 PG (ref 26.5–33)
MCHC RBC AUTO-ENTMCNC: 31.5 G/DL (ref 31.5–36.5)
MCV RBC AUTO: 102 FL (ref 78–100)
MONOCYTES # BLD AUTO: 0.5 10E9/L (ref 0–1.3)
MONOCYTES NFR BLD AUTO: 7.4 %
NEUTROPHILS # BLD AUTO: 4 10E9/L (ref 1.6–8.3)
NEUTROPHILS NFR BLD AUTO: 53.8 %
PLATELET # BLD AUTO: 239 10E9/L (ref 150–450)
RBC # BLD AUTO: 4 10E12/L (ref 3.8–5.2)
WBC # BLD AUTO: 7.3 10E9/L (ref 4–11)

## 2018-04-20 PROCEDURE — 80053 COMPREHEN METABOLIC PANEL: CPT | Performed by: FAMILY MEDICINE

## 2018-04-20 PROCEDURE — 84443 ASSAY THYROID STIM HORMONE: CPT | Performed by: FAMILY MEDICINE

## 2018-04-20 PROCEDURE — 36415 COLL VENOUS BLD VENIPUNCTURE: CPT | Performed by: FAMILY MEDICINE

## 2018-04-20 PROCEDURE — 99214 OFFICE O/P EST MOD 30 MIN: CPT | Performed by: FAMILY MEDICINE

## 2018-04-20 PROCEDURE — 85025 COMPLETE CBC W/AUTO DIFF WBC: CPT | Performed by: FAMILY MEDICINE

## 2018-04-20 RX ORDER — MULTIPLE VITAMINS W/ MINERALS TAB 9MG-400MCG
1 TAB ORAL DAILY
COMMUNITY
End: 2018-11-07

## 2018-04-20 RX ORDER — DULOXETIN HYDROCHLORIDE 30 MG/1
30 CAPSULE, DELAYED RELEASE ORAL DAILY
Qty: 90 CAPSULE | Refills: 1 | Status: SHIPPED | OUTPATIENT
Start: 2018-04-20 | End: 2018-07-09
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
## 2018-04-20 NOTE — MR AVS SNAPSHOT
After Visit Summary   4/20/2018    Steffi Klein    MRN: [id number]           Patient Information     Date Of Birth          1955        Visit Information        Provider Department      4/20/2018 3:45 PM Raul Newman MD [facility]        Today's Diagnoses     Hypersomnia    -  1    Hypotension, unspecified hypotension type        Tinnitus of both ears        Neuropathy        Hypothyroidism due to acquired atrophy of thyroid           Follow-ups after your visit        Additional Services     SLEEP EVALUATION & MANAGEMENT REFERRAL - [facility]  [phone] (Age 18 and up)       Please be aware that coverage of these services is subject to the terms and limitations of your health insurance plan.  Call member services at your health plan with any benefit or coverage questions.      Please bring the following to your appointment:    >>   List of current medications   >>   This referral request   >>   Any documents/labs given to you for this referral                      Future tests that were ordered for you today     Open Future Orders        Priority Expected Expires Ordered    SLEEP EVALUATION & MANAGEMENT REFERRAL - [facility]  [phone] (Age 18 and up) Routine  4/20/2019 4/20/2018            Who to contact     If you have questions or need follow up information about today's clinic visit or your schedule please contact [facility] directly at [phone].  Normal or non-critical lab and imaging results will be communicated to you by MyChart, letter or phone within 4 business days after the clinic has received the results. If you do not hear from us within 7 days, please contact the clinic through MyChart or phone. If you have a critical or abnormal lab result, we will notify you by phone as soon as possible.  Submit refill requests through [employer] or call your pharmacy and they will  "forward the refill request to us. Please allow 3 business days for your refill to be completed.          Additional Information About Your Visit        [employer] Information     Your Office Agent gives you secure access to your electronic health record. If you see a primary care provider, you can also send messages to your care team and make appointments. If you have questions, please call your primary care clinic.  If you do not have a primary care provider, please call [phone] and they will assist you.        Care EveryWhere ID     This is your Care EveryWhere ID. This could be used by other organizations to access your [place] medical records  [id number]        Your Vitals Were     Pulse Temperature Respirations Height Last Period Pulse Oximetry    64 98.2  F (36.8  C) (Oral) 16 5' 2\" (1.575 m) 08/13/2007 96%    Breastfeeding? BMI (Body Mass Index)                No 32.87 kg/m2           Blood Pressure from Last 3 Encounters:   04/20/18 98/62   12/14/17 127/80   07/18/17 104/72    Weight from Last 3 Encounters:   04/20/18 179 lb 11.2 oz (81.5 kg)   12/14/17 177 lb (80.3 kg)   07/18/17 197 lb 3.2 oz (89.4 kg)              We Performed the Following     CBC with platelets and differential     Comprehensive metabolic panel     TSH with free T4 reflex          Today's Medication Changes          These changes are accurate as of 4/20/18 11:59 PM.  If you have any questions, ask your nurse or doctor.               These medicines have changed or have updated prescriptions.        Dose/Directions    DULoxetine 30 MG EC capsule   Commonly known as:  CYMBALTA   This may have changed:    - medication strength  - how much to take   Used for:  Tinnitus of both ears, Neuropathy   Changed by:  Raul Newman MD        Dose:  30 mg   Take 1 capsule (30 mg) by mouth daily   Quantity:  90 capsule   Refills:  1            Where to get your medicines      These medications were sent to [facility] [address] [place], [place] - " [address]  [address], [facility] [address]     Phone:  [phone]     DULoxetine 30 MG EC capsule                Primary Care Provider Office Phone # Fax #    Raul Newman [phone] [address]  [facility] [address]        Equal Access to Services     [facility] : Hadii aad ku hadasho Soomaali, waaxda luqadaha, qaybta kaalmada adeegyada, waxay idiin hayaan adeeg kharash la'aan ah. So [facility] [phone].    ATENCIÓN: Si habla español, tiene a gonzalez disposición servicios gratuitos de asistencia lingüística. Gregame al [phone].    We comply with applicable federal civil rights laws and [place] laws. We do not discriminate on the basis of race, color, national origin, age, disability, sex, sexual orientation, or gender identity.            Thank you!     Thank you for choosing [facility]  for your care. Our goal is always to provide you with excellent care. Hearing back from our patients is one way we can continue to improve our services. Please take a few minutes to complete the written survey that you may receive in the mail after your visit with us. Thank you!             Your Updated Medication List - Protect others around you: Learn how to safely use, store and throw away your medicines at www.disposemymeds.org.          This list is accurate as of 4/20/18 11:59 PM.  Always use your most recent med list.                   Brand Name Dispense Instructions for use Diagnosis    CERAVE Lotn      Externally apply  topically.        doxycycline monohydrate 50 MG capsule     90 capsule    TAKE 1 TO 2 CAPSULES BY MOUTH DAILY AS NEEDED    Rosacea       DULoxetine 30 MG EC capsule    CYMBALTA    90 capsule    Take 1 capsule (30 mg) by mouth daily    Tinnitus of both ears, Neuropathy       gabapentin 300 MG capsule    NEURONTIN    270 capsule    TAKE 1 CAPSULE(300 MG) BY MOUTH THREE TIMES DAILY    Neuropathy        levothyroxine 75 MCG tablet    SYNTHROID/LEVOTHROID    90 tablet    TAKE 1 TABLET(75 MCG) BY MOUTH DAILY    Hypothyroidism due to acquired atrophy of thyroid       Multi-vitamin Tabs tablet      Take 1 tablet by mouth daily        pantoprazole 40 MG EC tablet    PROTONIX    90 tablet    TAKE 1 TABLET BY MOUTH DAILY, 30-60 BEFORE A MEAL    Gastroesophageal reflux disease, esophagitis presence not specified       POTASSIUM CITRATE PO      Take 10 mEq by mouth daily        vitamin B complex with vitamin C Tabs tablet      Take 1 tablet by mouth daily        vitamin D 1000 units capsule     100 capsule    Take 1 capsule by mouth daily.        ZYRTEC 10 MG tablet   Generic drug:  cetirizine     90    ONE TABLET DAILY    Cough

## 2018-04-21 LAB
ALBUMIN SERPL-MCNC: 3.9 G/DL (ref 3.4–5)
ALP SERPL-CCNC: 63 U/L (ref 40–150)
ALT SERPL W P-5'-P-CCNC: 30 U/L (ref 0–50)
ANION GAP SERPL CALCULATED.3IONS-SCNC: 1 MMOL/L (ref 3–14)
AST SERPL W P-5'-P-CCNC: 22 U/L (ref 0–45)
BILIRUB SERPL-MCNC: 0.2 MG/DL (ref 0.2–1.3)
BUN SERPL-MCNC: 16 MG/DL (ref 7–30)
CALCIUM SERPL-MCNC: 8.6 MG/DL (ref 8.5–10.1)
CHLORIDE SERPL-SCNC: 108 MMOL/L (ref 94–109)
CO2 SERPL-SCNC: 32 MMOL/L (ref 20–32)
CREAT SERPL-MCNC: 0.68 MG/DL (ref 0.52–1.04)
GFR SERPL CREATININE-BSD FRML MDRD: 88 ML/MIN/1.7M2
GLUCOSE SERPL-MCNC: 98 MG/DL (ref 70–99)
POTASSIUM SERPL-SCNC: 3.9 MMOL/L (ref 3.4–5.3)
PROT SERPL-MCNC: 7.2 G/DL (ref 6.8–8.8)
SODIUM SERPL-SCNC: 141 MMOL/L (ref 133–144)
TSH SERPL DL<=0.005 MIU/L-ACNC: 1.46 MU/L (ref 0.4–4)

## 2018-04-22 ENCOUNTER — MYC MEDICAL ADVICE (OUTPATIENT)
Dept: FAMILY MEDICINE | Facility: CLINIC | Age: 63
End: 2018-04-22

## 2018-04-22 PROBLEM — D75.89 MACROCYTOSIS: Status: ACTIVE | Noted: 2018-04-22

## 2018-05-09 ENCOUNTER — OFFICE VISIT (OUTPATIENT)
Dept: SLEEP MEDICINE | Facility: CLINIC | Age: 63
End: 2018-05-09
Payer: COMMERCIAL

## 2018-05-09 ENCOUNTER — MYC MEDICAL ADVICE (OUTPATIENT)
Dept: FAMILY MEDICINE | Facility: CLINIC | Age: 63
End: 2018-05-09

## 2018-05-09 ENCOUNTER — OFFICE VISIT (OUTPATIENT)
Dept: SLEEP MEDICINE | Facility: CLINIC | Age: 63
End: 2018-05-09
Attending: PHYSICIAN ASSISTANT
Payer: COMMERCIAL

## 2018-05-09 VITALS
WEIGHT: 182 LBS | HEART RATE: 65 BPM | BODY MASS INDEX: 32.25 KG/M2 | SYSTOLIC BLOOD PRESSURE: 111 MMHG | HEIGHT: 63 IN | DIASTOLIC BLOOD PRESSURE: 76 MMHG | RESPIRATION RATE: 16 BRPM | OXYGEN SATURATION: 95 %

## 2018-05-09 DIAGNOSIS — G47.10 HYPERSOMNIA: Primary | ICD-10-CM

## 2018-05-09 DIAGNOSIS — G25.81 RESTLESS LEGS SYNDROME (RLS): ICD-10-CM

## 2018-05-09 DIAGNOSIS — G47.10 HYPERSOMNIA: ICD-10-CM

## 2018-05-09 PROCEDURE — 99244 OFF/OP CNSLTJ NEW/EST MOD 40: CPT | Performed by: PHYSICIAN ASSISTANT

## 2018-05-09 NOTE — MR AVS SNAPSHOT
After Visit Summary   5/9/2018    Steffi Klein    MRN: [id number]           Patient Information     Date Of Birth          1955        Visit Information        Provider Department      5/9/2018 9:00 AM Goltz, Bennett Ezra, PA-C [facility]        Today's Diagnoses     Hypersomnia    -  1    Restless legs syndrome (RLS)          Care Instructions      Your BMI is Body mass index is 32.5 kg/(m^2).  Weight management is a personal decision.  If you are interested in exploring weight loss strategies, the following discussion covers the approaches that may be successful. Body mass index (BMI) is one way to tell whether you are at a healthy weight, overweight, or obese. It measures your weight in relation to your height.  A BMI of 18.5 to 24.9 is in the healthy range. A person with a BMI of 25 to 29.9 is considered overweight, and someone with a BMI of 30 or greater is considered obese. More than two-thirds of American adults are considered overweight or obese.  Being overweight or obese increases the risk for further weight gain. Excess weight may lead to heart disease and diabetes.  Creating and following plans for healthy eating and physical activity may help you improve your health.  Weight control is part of healthy lifestyle and includes exercise, emotional health, and healthy eating habits. Careful eating habits lifelong are the mainstay of weight control. Though there are significant health benefits from weight loss, long-term weight loss with diet alone may be very difficult to achieve- studies show long-term success with dietary management in less than 10% of people. Attaining a healthy weight may be especially difficult to achieve in those with severe obesity. In some cases, medications, devices and surgical management might be considered.  What can you do?  If you are overweight or obese and are interested in methods for weight loss, you should discuss this with your  provider.     Consider reducing daily calorie intake by 500 calories.     Keep a food journal.     Avoiding skipping meals, consider cutting portions instead.    Diet combined with exercise helps maintain muscle while optimizing fat loss. Strength training is particularly important for building and maintaining muscle mass. Exercise helps reduce stress, increase energy, and improves fitness. Increasing exercise without diet control, however, may not burn enough calories to loose weight.       Start walking three days a week 10-20 minutes at a time    Work towards walking thirty minutes five days a week     Eventually, increase the speed of your walking for 1-2 minutes at time    In addition, we recommend that you review healthy lifestyles and methods for weight loss available through the National Institutes of Health patient information sites:  http://win.niddk.nih.gov/publications/index.htm    And look into health and wellness programs that may be available through your health insurance provider, employer, local community center, or robert club.    Weight management plan: Patient was referred to their PCP to discuss a diet and exercise plan.            Follow-ups after your visit        Your next 10 appointments already scheduled     May 09, 2018 10:30 AM CDT   Oximetry  with BED 7 SH SLEEP   [facility] ([facility])    [address]  [facility] [address]   [phone]            May 10, 2018  9:30 AM CDT   Oximetry Drop Off with  SLEEP CENTER DME   [facility] ([facility])    [address]  [facility] [address]   [phone]              Future tests that were ordered for you today     Open Future Orders        Priority Expected Expires Ordered    Overnight oximetry study Routine  11/5/2018 5/9/2018    Ferritin Routine  5/9/2019 5/9/2018            Who to contact     If you have questions or  "need follow up information about today's clinic visit or your schedule please contact [facility] directly at [phone].  Normal or non-critical lab and imaging results will be communicated to you by MyChart, letter or phone within 4 business days after the clinic has received the results. If you do not hear from us within 7 days, please contact the clinic through Tele[employer] or phone. If you have a critical or abnormal lab result, we will notify you by phone as soon as possible.  Submit refill requests through [employer] or call your pharmacy and they will forward the refill request to us. Please allow 3 business days for your refill to be completed.          Additional Information About Your Visit        [employer] Information     [employer] gives you secure access to your electronic health record. If you see a primary care provider, you can also send messages to your care team and make appointments. If you have questions, please call your primary care clinic.  If you do not have a primary care provider, please call [phone] and they will assist you.        Care EveryWhere ID     This is your Care EveryWhere ID. This could be used by other organizations to access your [facility] medical records  [id number]        Your Vitals Were     Pulse Respirations Height Last Period Pulse Oximetry BMI (Body Mass Index)    65 16 1.594 m (5' 2.75\") 08/13/2007 95% 32.5 kg/m2       Blood Pressure from Last 3 Encounters:   05/09/18 111/76   04/20/18 98/62   12/14/17 127/80    Weight from Last 3 Encounters:   05/09/18 82.6 kg (182 lb)   04/20/18 81.5 kg (179 lb 11.2 oz)   12/14/17 80.3 kg (177 lb)              We Performed the Following     SLEEP EVALUATION & MANAGEMENT REFERRAL - ADULT -[facility]  [phone] (Age 18 and up)        Primary Care Provider Office Phone # Fax #    Raul Newman [phone] [address] [facility] [address]        Equal Access to " Services     [facility]: Hadii meri Barlow, waaxda luqadaha, qaybta kaalmalucia branham, akua douglas. So [facility] [phone].    ATENCIÓN: Si habla chaz, tiene a gonzalez disposición servicios gratuitos de asistencia lingüística. Llame al [phone].    We comply with applicable federal civil rights laws and [place] laws. We do not discriminate on the basis of race, color, national origin, age, disability, sex, sexual orientation, or gender identity.            Thank you!     Thank you for choosing [facility]  for your care. Our goal is always to provide you with excellent care. Hearing back from our patients is one way we can continue to improve our services. Please take a few minutes to complete the written survey that you may receive in the mail after your visit with us. Thank you!             Your Updated Medication List - Protect others around you: Learn how to safely use, store and throw away your medicines at www.disposemymeds.org.          This list is accurate as of 5/9/18 10:15 AM.  Always use your most recent med list.                   Brand Name Dispense Instructions for use Diagnosis    CERRIA Lotomar      Externally apply  topically.        doxycycline monohydrate 50 MG capsule     90 capsule    TAKE 1 TO 2 CAPSULES BY MOUTH DAILY AS NEEDED    Rosacea       DULoxetine 30 MG EC capsule    CYMBALTA    90 capsule    Take 1 capsule (30 mg) by mouth daily    Tinnitus of both ears, Neuropathy       gabapentin 300 MG capsule    NEURONTIN    270 capsule    TAKE 1 CAPSULE(300 MG) BY MOUTH THREE TIMES DAILY    Neuropathy       levothyroxine 75 MCG tablet    SYNTHROID/LEVOTHROID    90 tablet    TAKE 1 TABLET(75 MCG) BY MOUTH DAILY    Hypothyroidism due to acquired atrophy of thyroid       Multi-vitamin Tabs tablet      Take 1 tablet by mouth daily        pantoprazole 40 MG EC tablet    PROTONIX    90 tablet    TAKE 1 TABLET BY MOUTH DAILY, 30-60 BEFORE A  MEAL    Gastroesophageal reflux disease, esophagitis presence not specified       POTASSIUM CITRATE PO      Take 10 mEq by mouth daily        vitamin B complex with vitamin C Tabs tablet      Take 1 tablet by mouth daily        vitamin D 1000 units capsule     100 capsule    Take 1 capsule by mouth daily.        ZYRTEC 10 MG tablet   Generic drug:  cetirizine     90    ONE TABLET DAILY    Cough

## 2018-05-09 NOTE — MR AVS SNAPSHOT
After Visit Summary   5/9/2018    Steffi Klein    MRN: [id number]           Patient Information     Date Of Birth          1955        Visit Information        Provider Department      5/9/2018 10:30 AM BED 7 SH SLEEP [facility]        Today's Diagnoses     Hypersomnia           Follow-ups after your visit        Your next 10 appointments already scheduled     May 09, 2018 10:30 AM CDT   Oximetry  with BED 7 SH SLEEP   [facility] ([facility])    [address]  [facility] [place] [facility] [id number] [phone]            May 10, 2018  9:30 AM CDT   Oximetry Drop Off with  SLEEP CENTER DME   [facility] ([facility])    [address]  [facility] [address] [phone]              Future tests that were ordered for you today     Open Future Orders        Priority Expected Expires Ordered    Ferritin Routine  5/9/2019 5/9/2018            Who to contact     If you have questions or need follow up information about today's clinic visit or your schedule please contact [facility] directly at [phone].  Normal or non-critical lab and imaging results will be communicated to you by Humoun[employer]art, letter or phone within 4 business days after the clinic has received the results. If you do not hear from us within 7 days, please contact the clinic through Hum[employer] or phone. If you have a critical or abnormal lab result, we will notify you by phone as soon as possible.  Submit refill requests through [employer] or call your pharmacy and they will forward the refill request to us. Please allow 3 business days for your refill to be completed.          Additional Information About Your Visit        [employer] Information     [employer] gives you secure access to your electronic health record. If you see a primary care provider, you can also send messages to your care team and  make appointments. If you have questions, please call your primary care clinic.  If you do not have a primary care provider, please call [phone] and they will assist you.        Care EveryWhere ID     This is your Care EveryWhere ID. This could be used by other organizations to access your [place] medical records  [id number]        Your Vitals Were     Last Period                   08/13/2007            Blood Pressure from Last 3 Encounters:   05/09/18 111/76   04/20/18 98/62   12/14/17 127/80    Weight from Last 3 Encounters:   05/09/18 82.6 kg (182 lb)   04/20/18 81.5 kg (179 lb 11.2 oz)   12/14/17 80.3 kg (177 lb)              We Performed the Following     Overnight oximetry study        Primary Care Provider Office Phone # Fax #    Raul Newman [phone] [address] [facility] [address]        Equal Access to Services     [facility] : Hadii aad ku hadasho Soomaali, waaxda luqadaha, qaybta kaalmada adeegyada, waxay lilianain hayumeshn delilah alvarado . So [facility] [phone].    ATENCIÓN: Si leida esptimothy, tiene a gonzalez disposición servicios gratuitos de asistencia lingüística. Llame al [phone].    We comply with applicable federal civil rights laws and [place] laws. We do not discriminate on the basis of race, color, national origin, age, disability, sex, sexual orientation, or gender identity.            Thank you!     Thank you for choosing [facility]  for your care. Our goal is always to provide you with excellent care. Hearing back from our patients is one way we can continue to improve our services. Please take a few minutes to complete the written survey that you may receive in the mail after your visit with us. Thank you!             Your Updated Medication List - Protect others around you: Learn how to safely use, store and throw away your medicines at www.disposemymeds.org.          This list is accurate as of 5/9/18 10:28 AM.  Always use  your most recent med list.                   Brand Name Dispense Instructions for use Diagnosis    CERAVE Lotn      Externally apply  topically.        doxycycline monohydrate 50 MG capsule     90 capsule    TAKE 1 TO 2 CAPSULES BY MOUTH DAILY AS NEEDED    Rosacea       DULoxetine 30 MG EC capsule    CYMBALTA    90 capsule    Take 1 capsule (30 mg) by mouth daily    Tinnitus of both ears, Neuropathy       gabapentin 300 MG capsule    NEURONTIN    270 capsule    TAKE 1 CAPSULE(300 MG) BY MOUTH THREE TIMES DAILY    Neuropathy       levothyroxine 75 MCG tablet    SYNTHROID/LEVOTHROID    90 tablet    TAKE 1 TABLET(75 MCG) BY MOUTH DAILY    Hypothyroidism due to acquired atrophy of thyroid       Multi-vitamin Tabs tablet      Take 1 tablet by mouth daily        pantoprazole 40 MG EC tablet    PROTONIX    90 tablet    TAKE 1 TABLET BY MOUTH DAILY, 30-60 BEFORE A MEAL    Gastroesophageal reflux disease, esophagitis presence not specified       POTASSIUM CITRATE PO      Take 10 mEq by mouth daily        vitamin B complex with vitamin C Tabs tablet      Take 1 tablet by mouth daily        vitamin D 1000 units capsule     100 capsule    Take 1 capsule by mouth daily.        ZYRTEC 10 MG tablet   Generic drug:  cetirizine     90    ONE TABLET DAILY    Cough
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
## 2018-05-09 NOTE — PATIENT INSTRUCTIONS

## 2018-05-09 NOTE — PROGRESS NOTES
Patient instructed on MANASA use. Patient demonstrated and verbalized knowledge of use. Insurance was verified by financial securing. Device programmed. Device will be returned tomorrow before noon.      Fidelia BarakatHermosillo  [facility] - Specialist

## 2018-05-10 ENCOUNTER — DOCUMENTATION ONLY (OUTPATIENT)
Dept: SLEEP MEDICINE | Facility: CLINIC | Age: 63
End: 2018-05-10
Payer: COMMERCIAL

## 2018-05-15 ENCOUNTER — TELEPHONE (OUTPATIENT)
Dept: SLEEP MEDICINE | Facility: CLINIC | Age: 63
End: 2018-05-15

## 2018-05-15 DIAGNOSIS — R06.83 SNORING: ICD-10-CM

## 2018-05-15 DIAGNOSIS — G47.10 HYPERSOMNIA: ICD-10-CM

## 2018-05-15 DIAGNOSIS — R79.81 ABNORMAL PULSE OXIMETRY: Primary | ICD-10-CM

## 2018-05-15 NOTE — TELEPHONE ENCOUNTER
Oximetry results were obtained for the date of 5/9/2018.  The patient was on room air to screen for JEROD.  The study showed a valid recording time of 8:43 with a 4% desaturation index of 17.8/hr.  The mean oxygen saturation was 91.1% and the lowest SpO2 was 78%.  The patient spent 37.8 minutes below 89% SpO2.    These results suggest moderate JEROD, possibly positional as the desaturations became more prevalent and more severe after about 2 AM.       She stopped taking Cymbalta. Today is her first day back at work after 4 days off. She was sleeping in and still did not feel wide awake. She still feels drowsy.  She has not gotten her ferritin checked yet.    I am placing orders for an in lab split-night sleep study. I am ordering TCM because she had possible hypoxemia on the oximetry (although it was possibly artifact/related to apnea) and she has had a CO2 of 34 on metabolic panels.  Bennett Goltz, PA-C

## 2018-05-17 DIAGNOSIS — G25.81 RESTLESS LEGS SYNDROME (RLS): ICD-10-CM

## 2018-05-17 LAB — FERRITIN SERPL-MCNC: 85 NG/ML (ref 8–252)

## 2018-05-17 PROCEDURE — 82728 ASSAY OF FERRITIN: CPT | Performed by: FAMILY MEDICINE

## 2018-05-17 PROCEDURE — 36415 COLL VENOUS BLD VENIPUNCTURE: CPT | Performed by: FAMILY MEDICINE

## 2018-05-17 NOTE — NURSING NOTE
Overnight oximetry completed by patient.     Recording date: 05/09/2018    Duration: 08:43:00    Note: Download successful. Copy given to provider and scanned in to chart.      Fidelia Hermosillo  [facility] - Specialist

## 2018-05-27 DIAGNOSIS — G62.9 NEUROPATHY: ICD-10-CM

## 2018-05-27 DIAGNOSIS — H93.13 TINNITUS OF BOTH EARS: ICD-10-CM

## 2018-05-29 RX ORDER — DULOXETIN HYDROCHLORIDE 60 MG/1
CAPSULE, DELAYED RELEASE ORAL
Status: SHIPPED
Start: 2018-05-29 | End: 2018-07-09

## 2018-06-18 DIAGNOSIS — K21.9 GASTROESOPHAGEAL REFLUX DISEASE, ESOPHAGITIS PRESENCE NOT SPECIFIED: ICD-10-CM

## 2018-06-18 NOTE — TELEPHONE ENCOUNTER
"Requested Prescriptions   Pending Prescriptions Disp Refills     pantoprazole (PROTONIX) 40 MG EC tablet [Pharmacy Med Name: PANTOPRAZOLE 40MG TABLETS]  Last Written Prescription Date:  9/29/2017  Last Fill Quantity: 90 tablet,  # refills: 2   Last office visit: 4/20/2018 with prescribing provider:  Trent    90 tablet 0     Sig: TAKE 1 TABLET BY MOUTH DAILY, 30-60 BEFORE A MEAL    PPI Protocol Passed    6/18/2018  6:16 AM       Passed - Not on Clopidogrel (unless Pantoprazole ordered)       Passed - No diagnosis of osteoporosis on record       Passed - Recent (12 mo) or future (30 days) visit within the authorizing provider's specialty    Patient had office visit in the last 12 months or has a visit in the next 30 days with authorizing provider or within the authorizing provider's specialty.  See \"Patient Info\" tab in inbasket, or \"Choose Columns\" in Meds & Orders section of the refill encounter.           Passed - Patient is age 18 or older       Passed - No active pregnacy on record       Passed - No positive pregnancy test in past 12 months          "

## 2018-06-19 RX ORDER — PANTOPRAZOLE SODIUM 40 MG/1
TABLET, DELAYED RELEASE ORAL
Qty: 90 TABLET | Refills: 1 | Status: SHIPPED | OUTPATIENT
Start: 2018-06-19 | End: 2018-10-29

## 2018-06-19 NOTE — TELEPHONE ENCOUNTER
Prescription approved per [facility] Refill Protocol  Brittany Cannon RN BS

## 2018-06-27 ENCOUNTER — TELEPHONE (OUTPATIENT)
Dept: SLEEP MEDICINE | Facility: CLINIC | Age: 63
End: 2018-06-27

## 2018-06-27 NOTE — TELEPHONE ENCOUNTER
Steffi's ferritin was normal at 85. I called to inform her of this result. I also see that she is not yet scheduled for a sleep study. I will make sure our staff contacts her to schedule.  I left a message asking for a return call.  Bennett Goltz, PA-C

## 2018-06-28 ENCOUNTER — MYC MEDICAL ADVICE (OUTPATIENT)
Dept: FAMILY MEDICINE | Facility: CLINIC | Age: 63
End: 2018-06-28

## 2018-06-28 ENCOUNTER — OFFICE VISIT (OUTPATIENT)
Dept: ORTHOPEDICS | Facility: CLINIC | Age: 63
End: 2018-06-28
Payer: COMMERCIAL

## 2018-06-28 VITALS — DIASTOLIC BLOOD PRESSURE: 62 MMHG | WEIGHT: 180 LBS | SYSTOLIC BLOOD PRESSURE: 108 MMHG | BODY MASS INDEX: 32.14 KG/M2

## 2018-06-28 DIAGNOSIS — S52.502S CLOSED FRACTURE OF DISTAL END OF LEFT RADIUS, UNSPECIFIED FRACTURE MORPHOLOGY, SEQUELA: Primary | ICD-10-CM

## 2018-06-28 PROCEDURE — 99213 OFFICE O/P EST LOW 20 MIN: CPT | Performed by: ORTHOPAEDIC SURGERY
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
## 2018-06-28 NOTE — MR AVS SNAPSHOT
After Visit Summary   6/28/2018    Steffi Klein    MRN: [id number]           Patient Information     Date Of Birth          1955        Visit Information        Provider Department      6/28/2018 4:00 PM Jeremiah Valenzuela MD [facility] ORTHOPEDIC SURGERY        Today's Diagnoses     Closed fracture of distal end of left radius, unspecified fracture morphology, sequela    -  1       Follow-ups after your visit        Your next 10 appointments already scheduled     Sep 14, 2018  8:30 PM CDT   Psg Split W/Tcm with BED 3 SH SLEEP   [facility]    [address]  [facility] [address] [phone]            Sep 25, 2018 11:30 AM CDT   Return Sleep Patient with Bennett Ezra Goltz, PA-C   [facility]    [address] [id number] [phone]              Who to contact     If you have questions or need follow up information about today's clinic visit or your schedule please contact [facility] ORTHOPEDIC SURGERY directly at [phone].  Normal or non-critical lab and imaging results will be communicated to you by [employer], letter or phone within 4 business days after the clinic has received the results. If you do not hear from us within 7 days, please contact the clinic through [employer] or phone. If you have a critical or abnormal lab result, we will notify you by phone as soon as possible.  Submit refill requests through [employer] or call your pharmacy and they will forward the refill request to us. Please allow 3 business days for your refill to be completed.          Additional Information About Your Visit        MyChart Information     [employer] gives you secure access to your electronic health record. If you see a primary care provider, you can also send messages to your care team and make appointments. If you have questions,  please call your primary care clinic.  If you do not have a primary care provider, please call [phone] and they will assist you.        Care EveryWhere ID     This is your Care EveryWhere ID. This could be used by other organizations to access your [facility] medical records  [id number]        Your Vitals Were     Last Period BMI (Body Mass Index)                08/13/2007 32.14 kg/m2           Blood Pressure from Last 3 Encounters:   06/28/18 108/62   05/09/18 111/76   04/20/18 98/62    Weight from Last 3 Encounters:   06/28/18 180 lb (81.6 kg)   05/09/18 182 lb (82.6 kg)   04/20/18 179 lb 11.2 oz (81.5 kg)              We Performed the Following     Anna-Operative Worksheet ([place])        Primary Care Provider Office Phone # Fax #    Raul Newman [phone] [address] [facility] [address]        Equal Access to Services     [facility]: Hadii aad ku hadasho Soomaali, waaxda luqadaha, qaybta kaalmada adeegyada, waxay idiin hayaan adeeg khwilda lamarylin . So [facility] [phone].    ATENCIÓN: Si habla español, tiene a gonzalez disposición servicios gratuitos de asistencia lingüística. Llame al [phone].    We comply with applicable federal civil rights laws and [place] laws. We do not discriminate on the basis of race, color, national origin, age, disability, sex, sexual orientation, or gender identity.            Thank you!     Thank you for choosing [facility] ORTHOPEDIC SURGERY  for your care. Our goal is always to provide you with excellent care. Hearing back from our patients is one way we can continue to improve our services. Please take a few minutes to complete the written survey that you may receive in the mail after your visit with us. Thank you!             Your Updated Medication List - Protect others around you: Learn how to safely use, store and throw away your medicines at www.disposemymeds.org.          This list is accurate as of 6/28/18 11:59 PM.  Always use  your most recent med list.                   Brand Name Dispense Instructions for use Diagnosis    CERAVE Lotn      Externally apply  topically.        doxycycline monohydrate 50 MG capsule     90 capsule    TAKE 1 TO 2 CAPSULES BY MOUTH DAILY AS NEEDED    Rosacea       * DULoxetine 30 MG EC capsule    CYMBALTA    90 capsule    Take 1 capsule (30 mg) by mouth daily    Tinnitus of both ears, Neuropathy       * DULoxetine 60 MG EC capsule    CYMBALTA     TAKE 1 CAPSULE(60 MG) BY MOUTH DAILY    Tinnitus of both ears, Neuropathy       gabapentin 300 MG capsule    NEURONTIN    270 capsule    TAKE 1 CAPSULE(300 MG) BY MOUTH THREE TIMES DAILY    Neuropathy       levothyroxine 75 MCG tablet    SYNTHROID/LEVOTHROID    90 tablet    TAKE 1 TABLET(75 MCG) BY MOUTH DAILY    Hypothyroidism due to acquired atrophy of thyroid       Multi-vitamin Tabs tablet      Take 1 tablet by mouth daily        pantoprazole 40 MG EC tablet    PROTONIX    90 tablet    TAKE 1 TABLET BY MOUTH DAILY, 30-60 BEFORE A MEAL    Gastroesophageal reflux disease, esophagitis presence not specified       POTASSIUM CITRATE PO      Take 10 mEq by mouth daily        vitamin B complex with vitamin C Tabs tablet      Take 1 tablet by mouth daily        vitamin D 1000 units capsule     100 capsule    Take 1 capsule by mouth daily.        ZYRTEC 10 MG tablet   Generic drug:  cetirizine     90    ONE TABLET DAILY    Cough       * Notice:  This list has 2 medication(s) that are the same as other medications prescribed for you. Read the directions carefully, and ask your doctor or other care provider to review them with you.

## 2018-06-28 NOTE — LETTER
6/28/2018         RE: Steffi Klein  [address]  [facility] [facility] [address]        Dear Colleague,    Thank you for referring your patient, Steffi Klein, to the [facility] ORTHOPEDIC SURGERY. Please see a copy of my visit note below.    HISTORY OF PRESENT ILLNESS:    Steffi Klein is a 63 year old female who is seen in follow up  for left wrist pain s/p ORIF 12/3/2012.  Patient has had chronic wrist discomfort since injury in 2012, patient notes increased pain with wound on 6/26/18.  Patient would like to discuss hardware removal.  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   Present symptoms: constant ache of wrist, increased pain with movement of wrist, difficulties gripping and noticeable decreased strength of hand. Patient states she woke up on 6/26/18 with red sore on radial aspect of wrist near site of most discomfort.   Treatments tried to this point: utilizing wrist splint from post op    Past Medical History:   Diagnosis Date     Anxiety 12/28/2009     CARDIOVASCULAR SCREENING; LDL GOAL LESS THAN 160 10/31/2010     Chronic pain     left wrist     Distal radius fracture 1/28/2013     Esophageal reflux 7/2/2006     Hypothyroidism 7/2/2014     Irritable bowel syndrome      Lumbago      Macrocytosis 4/22/2018     Neuropathy 6/30/2014    R foot post op      Non morbid obesity due to excess calories 7/18/2017     Obesity 7/13/2015     Onychomycosis 12/14/2017     Osteoporosis 2012     Reflux esophagitis      Restless legs syndrome (RLS)      Rosacea 7/13/2015     Squamous cell carcinoma of shoulder, left      Wrist pain 1/28/2013       Past Surgical History:   Procedure Laterality Date     C NONSPECIFIC PROCEDURE      3 NSVDs     D & C       ESOPHAGOSCOPY, GASTROSCOPY, DUODENOSCOPY (EGD), COMBINED  10/24/2014    Dr.Levine PANIAGUA     ESOPHAGOSCOPY, GASTROSCOPY, DUODENOSCOPY (EGD), COMBINED N/A 10/24/2014    Procedure: COMBINED ESOPHAGOSCOPY, GASTROSCOPY, DUODENOSCOPY (EGD);   Surgeon: Black Alva MD;  Location:  GI     OPEN REDUCTION INTERNAL FIXATION WRIST  12/3/2012    Procedure: OPEN REDUCTION INTERNAL FIXATION WRIST;  Left Wrist Open Reduction Internal Fixation        ORTHOPEDIC SURGERY      right ankle surgery     skin cancer excision       TONSILLECTOMY  as a child        Family History   Problem Relation Age of Onset     Cancer Father      lung and brain cancer,  at age of 69     Cancer Mother      lung cancer, surgically resected     Arthritis Mother      has had knee surgeries     Alzheimer Disease Mother      Cancer Paternal Aunt       from breast CA     Alzheimer Disease Maternal Grandfather        Social History     Social History     Marital status:      Spouse name: El     Number of children: 3     Years of education: N/A     Occupational History      [employer]     Social History Main Topics     Smoking status: Former Smoker     Smokeless tobacco: Never Used      Comment: quit 's     Alcohol use Yes      Comment: socially     Drug use: No     Sexual activity: Yes     Partners: Male     Other Topics Concern     Parent/Sibling W/ Cabg, Mi Or Angioplasty Before 65f 55m? No     Social History Narrative       Current Outpatient Prescriptions   Medication Sig Dispense Refill     Cholecalciferol (VITAMIN D) 1000 UNIT capsule Take 1 capsule by mouth daily. 100 capsule 12     doxycycline Monohydrate 50 MG CAPS capsule TAKE 1 TO 2 CAPSULES BY MOUTH DAILY AS NEEDED 90 capsule 3     DULoxetine (CYMBALTA) 30 MG EC capsule Take 1 capsule (30 mg) by mouth daily 90 capsule 1     DULoxetine (CYMBALTA) 60 MG EC capsule TAKE 1 CAPSULE(60 MG) BY MOUTH DAILY       Emollient (CERAVE) LOTN Externally apply  topically.       gabapentin (NEURONTIN) 300 MG capsule TAKE 1 CAPSULE(300 MG) BY MOUTH THREE TIMES DAILY 270 capsule 1     levothyroxine (SYNTHROID/LEVOTHROID) 75 MCG tablet TAKE 1 TABLET(75 MCG) BY MOUTH DAILY 90 tablet 2      multivitamin, therapeutic with minerals (MULTI-VITAMIN) TABS tablet Take 1 tablet by mouth daily       pantoprazole (PROTONIX) 40 MG EC tablet TAKE 1 TABLET BY MOUTH DAILY, 30-60 BEFORE A MEAL 90 tablet 1     POTASSIUM CITRATE PO Take 10 mEq by mouth daily       vitamin B complex with vitamin C (VITAMIN  B COMPLEX) TABS tablet Take 1 tablet by mouth daily       ZYRTEC 10 MG OR TABS ONE TABLET DAILY 90 3       Allergies   Allergen Reactions     Penicillins Rash     Gets a lot of yeast build-up when on antibiotics        REVIEW OF SYSTEMS:  CONSTITUTIONAL:  NEGATIVE for fever, chills, change in weight  INTEGUMENTARY/SKIN:  NEGATIVE for worrisome rashes, moles or lesions  EYES:  NEGATIVE for vision changes or irritation  ENT/MOUTH:  NEGATIVE for ear, mouth and throat problems  RESP:  NEGATIVE for significant cough or SOB  BREAST:  NEGATIVE for masses, tenderness or discharge  CV:  NEGATIVE for chest pain, palpitations or peripheral edema  GI:  NEGATIVE for nausea, abdominal pain, heartburn, or change in bowel habits  :  Negative   MUSCULOSKELETAL:  See HPI above  NEURO:  NEGATIVE for weakness, dizziness or paresthesias  ENDOCRINE:  NEGATIVE for temperature intolerance, skin/hair changes  HEME/ALLERGY/IMMUNE:  NEGATIVE for bleeding problems  PSYCHIATRIC:  NEGATIVE for changes in mood or affect      PHYSICAL EXAM:  LMP 08/13/2007  There is no height or weight on file to calculate BMI.   GENERAL APPEARANCE: healthy, alert and no distress   SKIN: no suspicious lesions or rashes  NEURO: Normal strength and tone, mentation intact and speech normal  VASCULAR: Good pulses, and capillary refill   LYMPH: no lymphadenopathy   PSYCH:  mentation appears normal and affect normal/bright    MSK:  Examination of her left wrist reveals a small area of erythema on the volar radial aspect of wrist.  CMS is intact to her fingertips.     ASSESSMENT / PLAN: Symptomatic hardware left distal radius fracture ORIF 6 years ago.  I offered  her surgical excision of the hardware and she has accepted.  She will will schedule this at her convenience.      Imaging Interpretation:  Imaging reviewed of left wrist from 7/9/14.    Wan Valenzuela MD  Department of Orthopedic Surgery          Again, thank you for allowing me to participate in the care of your patient.        Sincerely,        Jeremiah Valenzuela MD

## 2018-07-02 ENCOUNTER — TELEPHONE (OUTPATIENT)
Dept: ORTHOPEDICS | Facility: CLINIC | Age: 63
End: 2018-07-02

## 2018-07-02 NOTE — TELEPHONE ENCOUNTER
Type of surgery: Left distal radius hdwre removal   Equip:  Classiphix Anna-Lock instruments  Location of surgery: [facility]  Date and time of surgery: 7/11/18 8:30 am  Surgeon: Jeremiah Valenzuela  Pre-Op Appt Date: 7/9/18 Dr Newman  Post-Op Appt Date: 7/24/18  Packet sent out: Yes  Pre-cert/Authorization completed:  No

## 2018-07-09 ENCOUNTER — OFFICE VISIT (OUTPATIENT)
Dept: FAMILY MEDICINE | Facility: CLINIC | Age: 63
End: 2018-07-09
Payer: COMMERCIAL

## 2018-07-09 VITALS
HEIGHT: 63 IN | RESPIRATION RATE: 12 BRPM | BODY MASS INDEX: 32.6 KG/M2 | TEMPERATURE: 97.8 F | DIASTOLIC BLOOD PRESSURE: 64 MMHG | HEART RATE: 60 BPM | WEIGHT: 184 LBS | OXYGEN SATURATION: 99 % | SYSTOLIC BLOOD PRESSURE: 94 MMHG

## 2018-07-09 DIAGNOSIS — G62.9 NEUROPATHY: ICD-10-CM

## 2018-07-09 DIAGNOSIS — E03.4 HYPOTHYROIDISM DUE TO ACQUIRED ATROPHY OF THYROID: ICD-10-CM

## 2018-07-09 DIAGNOSIS — R79.81 ABNORMAL PULSE OXIMETRY: ICD-10-CM

## 2018-07-09 DIAGNOSIS — Z11.4 SCREENING FOR HIV (HUMAN IMMUNODEFICIENCY VIRUS): ICD-10-CM

## 2018-07-09 DIAGNOSIS — R00.1 BRADYCARDIA: ICD-10-CM

## 2018-07-09 DIAGNOSIS — Z01.818 PREOP GENERAL PHYSICAL EXAM: Primary | ICD-10-CM

## 2018-07-09 DIAGNOSIS — I95.9 HYPOTENSION, UNSPECIFIED HYPOTENSION TYPE: ICD-10-CM

## 2018-07-09 LAB
BASOPHILS # BLD AUTO: 0 10E9/L (ref 0–0.2)
BASOPHILS NFR BLD AUTO: 0.5 %
DIFFERENTIAL METHOD BLD: ABNORMAL
EOSINOPHIL # BLD AUTO: 0.2 10E9/L (ref 0–0.7)
EOSINOPHIL NFR BLD AUTO: 2.6 %
ERYTHROCYTE [DISTWIDTH] IN BLOOD BY AUTOMATED COUNT: 13.1 % (ref 10–15)
HCT VFR BLD AUTO: 41.2 % (ref 35–47)
HGB BLD-MCNC: 13.2 G/DL (ref 11.7–15.7)
LYMPHOCYTES # BLD AUTO: 2.8 10E9/L (ref 0.8–5.3)
LYMPHOCYTES NFR BLD AUTO: 38 %
MCH RBC QN AUTO: 32.5 PG (ref 26.5–33)
MCHC RBC AUTO-ENTMCNC: 32 G/DL (ref 31.5–36.5)
MCV RBC AUTO: 102 FL (ref 78–100)
MONOCYTES # BLD AUTO: 0.5 10E9/L (ref 0–1.3)
MONOCYTES NFR BLD AUTO: 6.1 %
NEUTROPHILS # BLD AUTO: 3.9 10E9/L (ref 1.6–8.3)
NEUTROPHILS NFR BLD AUTO: 52.8 %
PLATELET # BLD AUTO: 250 10E9/L (ref 150–450)
RBC # BLD AUTO: 4.06 10E12/L (ref 3.8–5.2)
WBC # BLD AUTO: 7.3 10E9/L (ref 4–11)

## 2018-07-09 PROCEDURE — 36415 COLL VENOUS BLD VENIPUNCTURE: CPT | Performed by: FAMILY MEDICINE

## 2018-07-09 PROCEDURE — 93000 ELECTROCARDIOGRAM COMPLETE: CPT | Performed by: FAMILY MEDICINE

## 2018-07-09 PROCEDURE — 99214 OFFICE O/P EST MOD 30 MIN: CPT | Performed by: FAMILY MEDICINE

## 2018-07-09 PROCEDURE — 87389 HIV-1 AG W/HIV-1&-2 AB AG IA: CPT | Performed by: FAMILY MEDICINE

## 2018-07-09 PROCEDURE — 85025 COMPLETE CBC W/AUTO DIFF WBC: CPT | Performed by: FAMILY MEDICINE

## 2018-07-09 PROCEDURE — 80048 BASIC METABOLIC PNL TOTAL CA: CPT | Performed by: FAMILY MEDICINE

## 2018-07-09 RX ORDER — GABAPENTIN 300 MG/1
CAPSULE ORAL
Qty: 270 CAPSULE | Refills: 3 | Status: SHIPPED | OUTPATIENT
Start: 2018-07-09 | End: 2019-07-04

## 2018-07-09 RX ORDER — LEVOTHYROXINE SODIUM 75 UG/1
TABLET ORAL
Qty: 90 TABLET | Refills: 3 | Status: SHIPPED | OUTPATIENT
Start: 2018-07-09 | End: 2019-07-04

## 2018-07-09 NOTE — PATIENT INSTRUCTIONS
Before Your Surgery      Call your surgeon if there is any change in your health. This includes signs of a cold or flu (such as a sore throat, runny nose, cough, rash or fever).    Do not smoke, drink alcohol or take over the counter medicine (unless your surgeon or primary care doctor tells you to) for the 24 hours before and after surgery.    If you take prescribed drugs: Follow your doctor s orders about which medicines to take and which to stop until after surgery.    Eating and drinking prior to surgery: follow the instructions from your surgeon    Take a shower or bath the night before surgery. Use the soap your surgeon gave you to gently clean your skin. If you do not have soap from your surgeon, use your regular soap. Do not shave or scrub the surgery site.  Wear clean pajamas and have clean sheets on your bed.     Take only gabapentin AM of surgery

## 2018-07-09 NOTE — PROGRESS NOTES
[facility]  [address] [facility] [address] [id number] [phone]  Dept: [phone]    PRE-OP EVALUATION:  Today's date: 2018    Steffi Klein (: 1955) presents for pre-operative evaluation assessment as requested by Dr. Valenzuela.  She requires evaluation and anesthesia risk assessment prior to undergoing surgery/procedure for treatment of L wrist-hardware removal .    Fax number for surgical facility:   Primary Physician: Raul Newman  Type of Anesthesia Anticipated: General    Patient has a Health Care Directive or Living Will:  NO    Preop Questions 2018   1.  Do you have a history of Heart attack, stroke, stent, coronary bypass surgery, or other heart surgery? No   2.  Do you ever have any pain or discomfort in your chest? No   3.  Do you have a history of  Heart Failure? No   4.   Are you troubled by shortness of breath when:  walking on a level surface, or up a slight hill, or at night? No   5.  Do you currently have a cold, bronchitis or other respiratory infection? No   6.  Do you have a cough, shortness of breath, or wheezing? No   7.  Do you sometimes get pains in the calves of your legs when you walk? No   8. Do you or anyone in your family have previous history of blood clots? No   9.  Do you or does anyone in your family have a serious bleeding problem such as prolonged bleeding following surgeries or cuts? No   10. Have you ever had problems with anemia or been told to take iron pills? No   11. Have you had any abnormal blood loss such as black, tarry or bloody stools, or abnormal vaginal bleeding? No   12. Have you ever had a blood transfusion? No   13. Have you or any of your relatives ever had problems with anesthesia? YES - just sensitive    14. Do you have sleep apnea, excessive snoring or daytime drowsiness? YES - Scheduled for sleep study.  Abnormal nocturnal oximetry   15. Do you have any prosthetic heart valves? No   16. Do you have  prosthetic joints? No   17. Is there any chance that you may be pregnant? No         HPI:     HPI related to upcoming procedure: Patient has had chronic left wrist discomfort since injury in 2012, and notes an increase in pain with wound on 6/26/18 according to Dr. Valenzuela's office visit note on 6/28/18. Surgery is on 7/11/18.     MEDICAL HISTORY:     Patient Active Problem List    Diagnosis Date Noted     Macrocytosis 04/22/2018     Priority: Medium     Onychomycosis 12/14/2017     Priority: Medium     Non morbid obesity due to excess calories 07/18/2017     Priority: Medium     Rosacea 07/13/2015     Priority: Medium     Hypothyroidism 07/02/2014     Priority: Medium     Neuropathy 06/30/2014     Priority: Medium     R foot post op       CARDIOVASCULAR SCREENING; LDL GOAL LESS THAN 160 10/31/2010     Priority: Medium     2.4% 10 yr risk 2015       Anxiety 12/28/2009     Priority: Medium     Esophageal reflux 07/02/2006     Priority: Medium     Famotidine (pepcid) 20 or ranitidine(zantac) 150 failure        Past Medical History:   Diagnosis Date     Anxiety 12/28/2009     CARDIOVASCULAR SCREENING; LDL GOAL LESS THAN 160 10/31/2010     Chronic pain     left wrist     Distal radius fracture 1/28/2013     Esophageal reflux 7/2/2006     Hypothyroidism 7/2/2014     Irritable bowel syndrome      Lumbago      Macrocytosis 4/22/2018     Neuropathy 6/30/2014    R foot post op      Non morbid obesity due to excess calories 7/18/2017     Obesity 7/13/2015     Onychomycosis 12/14/2017     Osteoporosis 2012     Reflux esophagitis      Restless legs syndrome (RLS)      Rosacea 7/13/2015     Squamous cell carcinoma of shoulder, left      Wrist pain 1/28/2013     Past Surgical History:   Procedure Laterality Date     C NONSPECIFIC PROCEDURE      3 NSVDs     D & C       ESOPHAGOSCOPY, GASTROSCOPY, DUODENOSCOPY (EGD), COMBINED  10/24/2014     [facility] beds [facility] the [facility]     ESOPHAGOSCOPY, GASTROSCOPY, DUODENOSCOPY (EGD), COMBINED N/A 10/24/2014     Procedure: COMBINED ESOPHAGOSCOPY, GASTROSCOPY, DUODENOSCOPY (EGD);  Surgeon: Black Alva MD;  Location: [facility]     OPEN REDUCTION INTERNAL FIXATION WRIST  12/3/2012    Procedure: OPEN REDUCTION INTERNAL FIXATION WRIST;  Left Wrist Open Reduction Internal Fixation        ORTHOPEDIC SURGERY      right ankle surgery     skin cancer excision       TONSILLECTOMY  as a child      Current Outpatient Prescriptions   Medication Sig Dispense Refill     Cholecalciferol (VITAMIN D) 1000 UNIT capsule Take 1 capsule by mouth daily. 100 capsule 12     doxycycline Monohydrate 50 MG CAPS capsule TAKE 1 TO 2 CAPSULES BY MOUTH DAILY AS NEEDED 90 capsule 3     DULoxetine (CYMBALTA) 30 MG EC capsule Take 1 capsule (30 mg) by mouth daily (Patient not taking: Reported on 6/28/2018) 90 capsule 1     DULoxetine (CYMBALTA) 60 MG EC capsule TAKE 1 CAPSULE(60 MG) BY MOUTH DAILY (Patient not taking: Reported on 6/28/2018)       Emollient (CERAVE) LOTN Externally apply  topically.       gabapentin (NEURONTIN) 300 MG capsule TAKE 1 CAPSULE(300 MG) BY MOUTH THREE TIMES DAILY 270 capsule 1     levothyroxine (SYNTHROID/LEVOTHROID) 75 MCG tablet TAKE 1 TABLET(75 MCG) BY MOUTH DAILY 90 tablet 2     multivitamin, therapeutic with minerals (MULTI-VITAMIN) TABS tablet Take 1 tablet by mouth daily       pantoprazole (PROTONIX) 40 MG EC tablet TAKE 1 TABLET BY MOUTH DAILY, 30-60 BEFORE A MEAL 90 tablet 1     POTASSIUM CITRATE PO Take 10 mEq by mouth daily       vitamin B complex with vitamin C (VITAMIN  B COMPLEX) TABS tablet Take 1 tablet by mouth daily       ZYRTEC 10 MG OR TABS ONE TABLET DAILY 90 3     OTC products: None, except as noted above    Allergies   Allergen Reactions     Penicillins Rash     Gets a lot of yeast build-up when on antibiotics       Latex Allergy: NO    Social History   Substance Use Topics     Smoking status: Former Smoker     Smokeless tobacco: Never Used      Comment: quit 1970's     Alcohol use Yes      Comment:  "socially     History   Drug Use No       REVIEW OF SYSTEMS:   CONSTITUTIONAL: NEGATIVE for fever, chills, change in weight Daytime fatigue, falls asleep readily  INTEGUMENTARY/SKIN: POSITIVE for red marks on left wrist attributed to  hardware   EYES: NEGATIVE for vision changes or irritation  ENT/MOUTH: NEGATIVE for ear, mouth and throat problems  RESP: NEGATIVE for significant cough or SOB  CV: NEGATIVE for chest pain, palpitations or peripheral edema, syncope  GI: NEGATIVE for nausea, abdominal pain, heartburn, or change in bowel habits  : NEGATIVE for frequency, dysuria, or hematuria  MUSCULOSKELETAL: NEGATIVE for significant arthralgias or myalgia  NEURO: NEGATIVE for weakness, dizziness or paresthesias  PSYCHIATRIC: NEGATIVE for changes in mood or affect    This document serves as a record of the services and decisions personally performed and made by Raul Newman MD. It was created on his behalf by John Schafer, a trained medical scribe.  The creation of this document is based on the scribe's personal observations and the provider's statements to the medical scribe.  John Schafer, July 9, 2018 5:51 PM    EXAM:   BP 94/64 (BP Location: Right arm, Patient Position: Chair, Cuff Size: Adult Regular)  Pulse 60  Temp 97.8  F (36.6  C) (Oral)  Resp 12  Ht 5' 2.75\" (1.594 m)  Wt 184 lb (83.5 kg)  LMP 08/13/2007  SpO2 99%  BMI 32.85 kg/m2    GENERAL APPEARANCE: healthy, alert and no distress     EYES: EOMI, PERRL     HENT: ear canals and TM's normal and nose and mouth without ulcers or lesions     NECK: no adenopathy, no asymmetry, masses, or scars and thyroid normal to palpation     RESP: lungs clear to auscultation - no rales, rhonchi or wheezes     CV: regular rates and rhythm, normal S1 S2, no S3 or S4 and no murmur, click or rub     ABDOMEN:  soft, nontender, no HSM or masses and bowel sounds normal     MS: per orthol     LYMPHATICS: No cervical adenopathy    DIAGNOSTICS:   EKG: shows sinus bradycardia, " junctional escape beat, otherwise unchanged from previous tracings    Recent Labs   Lab Test  04/20/18   1624  07/18/17   0845   06/18/13   0741   HGB  12.8   --    --   13.0   PLT  239   --    --   202   NA  141  143   < >  143   POTASSIUM  3.9  4.2   < >  4.0   CR  0.68  0.93   < >  0.72    < > = values in this interval not displayed.     IMPRESSION:   Reason for surgery/procedure: L wrist-hardware removal .  Diagnosis/reason for consult: assess perioperative risk       The proposed surgical procedure is considered INTERMEDIATE risk.    REVISED CARDIAC RISK INDEX  The patient has the following serious cardiovascular risks for perioperative complications such as (MI, PE, VFib and 3  AV Block):  No serious cardiac risks  INTERPRETATION: 1 risks: Class II (low risk - 0.9% complication rate)    The patient has the following additional risks for perioperative complications:  Bradycardia, asymptomativ hypotension, probable sleep apnea  RECOMMENDATIONS:   ASSESSMENT / PLAN:  (Z01.818) Preop general physical exam  (primary encounter diagnosis)  Comment: Patient is experiencing left wrist pain and is cleared for surgery  Plan: CBC with platelets and differential, Basic         metabolic panel  (Ca, Cl, CO2, Creat, Gluc, K,         Na, BUN), EKG 12-lead complete w/read - Clinics            (G62.9) Neuropathy  Comment: Controlled with use of gabapentin   Plan: gabapentin (NEURONTIN) 300 MG capsule            (E03.4) Hypothyroidism due to acquired atrophy of thyroid  Comment:   TSH   Date Value Ref Range Status   04/20/2018 1.46 0.40 - 4.00 mU/L Final     Plan: levothyroxine (SYNTHROID/LEVOTHROID) 75 MCG         tablet            (Z11.4) Screening for HIV (human immunodeficiency virus)  Comment: One time administration   Plan: HIV Antigen Antibody Combo            Discussed shingles shot.        RTC Post surgery.    Raul Newman MD          APPROVAL GIVEN to proceed with proposed procedure, without further diagnostic  evaluation   Attention to respiratory status in the post operative period is recommended. Bradycardia and untreated hypotension do no appear to convey additional perioperative risk. Evaluation is underway (sleep study Sept)    The information in this document, created by the medical scribe for me, accurately reflects the services I personally performed and the decisions made by me. I have reviewed and approved this document for accuracy prior to leaving the patient care area.  Raul Newman MD July 9, 2018 5:43 PM      Copy of this evaluation report is provided to requesting physician.    [facility] Preop Guidelines    Revised Cardiac Risk Index

## 2018-07-09 NOTE — MR AVS SNAPSHOT
After Visit Summary   7/9/2018    Steffi Klein    MRN: [id number]           Patient Information     Date Of Birth          1955        Visit Information        Provider Department      7/9/2018 5:30 PM Raul Newman MD [facility]        Today's Diagnoses     Preop general physical exam    -  1    Neuropathy        Hypothyroidism due to acquired atrophy of thyroid        Screening for HIV (human immunodeficiency virus)          Care Instructions      Before Your Surgery      Call your surgeon if there is any change in your health. This includes signs of a cold or flu (such as a sore throat, runny nose, cough, rash or fever).    Do not smoke, drink alcohol or take over the counter medicine (unless your surgeon or primary care doctor tells you to) for the 24 hours before and after surgery.    If you take prescribed drugs: Follow your doctor s orders about which medicines to take and which to stop until after surgery.    Eating and drinking prior to surgery: follow the instructions from your surgeon    Take a shower or bath the night before surgery. Use the soap your surgeon gave you to gently clean your skin. If you do not have soap from your surgeon, use your regular soap. Do not shave or scrub the surgery site.  Wear clean pajamas and have clean sheets on your bed.     Take only gabapentin AM of surgery          Follow-ups after your visit        Your next 10 appointments already scheduled     Jul 24, 2018  8:00 AM CDT   Return Visit with Jeromy Arce PA-C   [facility] ORTHOPEDIC SURGERY ([facility])    [address]  [facility] [address]   [phone]            Sep 14, 2018  8:30 PM CDT   Psg Split W/Tcm with BED 3 SH SLEEP   [facility] ([facility])    [address]  [facility] [address] [phone]            Sep 25, 2018 11:30 AM CDT   Return Sleep  "Patient with Humphreysnett Ezra Goltz, PA-C   [facility] ([facility])    [address] [address] [phone]              Who to contact     If you have questions or need follow up information about today's clinic visit or your schedule please contact [facility] directly at [phone].  Normal or non-critical lab and imaging results will be communicated to you by [employer], letter or phone within 4 business days after the clinic has received the results. If you do not hear from us within 7 days, please contact the clinic through [employer] or phone. If you have a critical or abnormal lab result, we will notify you by phone as soon as possible.  Submit refill requests through [employer] or call your pharmacy and they will forward the refill request to us. Please allow 3 business days for your refill to be completed.          Additional Information About Your Visit        [employer] Information     [employer] gives you secure access to your electronic health record. If you see a primary care provider, you can also send messages to your care team and make appointments. If you have questions, please call your primary care clinic.  If you do not have a primary care provider, please call [phone] and they will assist you.        Care EveryWhere ID     This is your Care EveryWhere ID. This could be used by other organizations to access your [facility] medical records  [id number]        Your Vitals Were     Pulse Temperature Respirations Height Last Period Pulse Oximetry    60 97.8  F (36.6  C) (Oral) 12 5' 2.75\" (1.594 m) 08/13/2007 99%    BMI (Body Mass Index)                   32.85 kg/m2            Blood Pressure from Last 3 Encounters:   07/09/18 94/64   06/28/18 108/62   05/09/18 111/76    Weight from Last 3 Encounters:   07/09/18 184 lb (83.5 kg)   06/28/18 180 lb (81.6 kg)   05/09/18 182 lb (82.6 kg)              We Performed the " Following     Basic metabolic panel  (Ca, Cl, CO2, Creat, Gluc, K, Na, BUN)     CBC with platelets and differential     EKG 12-lead complete w/read - Clinics     HIV Antigen Antibody Combo          Today's Medication Changes          These changes are accurate as of 7/9/18  6:17 PM.  If you have any questions, ask your nurse or doctor.               Stop taking these medicines if you haven't already. Please contact your care team if you have questions.     DULoxetine 30 MG EC capsule   Commonly known as:  CYMBALTA   Stopped by:  Raul Newman MD           DULoxetine 60 MG EC capsule   Commonly known as:  CYMBALTA   Stopped by:  Raul Newman MD                Where to get your medicines      Some of these will need a paper prescription and others can be bought over the counter.  Ask your nurse if you have questions.     Bring a paper prescription for each of these medications     gabapentin 300 MG capsule    levothyroxine 75 MCG tablet                Primary Care Provider Office Phone # Fax #    Raul Newman [phone] [address] [facility] [address]        Equal Access to Services     [facility]: Hadii aad ku hadasho Soomaali, waaxda luqadaha, qaybta kaalmada adeegyada, waxay lilianain hayluis alvarado . So [facility] [phone].    ATENCIÓN: Si habla español, tiene a gonzalez disposición servicios gratuitos de asistencia lingüística. Llame al [phone].    We comply with applicable federal civil rights laws and [place] laws. We do not discriminate on the basis of race, color, national origin, age, disability, sex, sexual orientation, or gender identity.            Thank you!     Thank you for choosing [facility]  for your care. Our goal is always to provide you with excellent care. Hearing back from our patients is one way we can continue to improve our services. Please take a few minutes to complete the written survey that you may receive in the mail  after your visit with us. Thank you!             Your Updated Medication List - Protect others around you: Learn how to safely use, store and throw away your medicines at www.disposemymeds.org.          This list is accurate as of 7/9/18  6:17 PM.  Always use your most recent med list.                   Brand Name Dispense Instructions for use Diagnosis    SANJANA Mujica      Externally apply  topically.        doxycycline monohydrate 50 MG capsule     90 capsule    TAKE 1 TO 2 CAPSULES BY MOUTH DAILY AS NEEDED    Rosacea       gabapentin 300 MG capsule    NEURONTIN    270 capsule    TAKE 1 CAPSULE(300 MG) BY MOUTH THREE TIMES DAILY    Neuropathy       levothyroxine 75 MCG tablet    SYNTHROID/LEVOTHROID    90 tablet    TAKE 1 TABLET(75 MCG) BY MOUTH DAILY    Hypothyroidism due to acquired atrophy of thyroid       Multi-vitamin Tabs tablet      Take 1 tablet by mouth daily        pantoprazole 40 MG EC tablet    PROTONIX    90 tablet    TAKE 1 TABLET BY MOUTH DAILY, 30-60 BEFORE A MEAL    Gastroesophageal reflux disease, esophagitis presence not specified       POTASSIUM CITRATE PO      Take 10 mEq by mouth daily        vitamin B complex with vitamin C Tabs tablet      Take 1 tablet by mouth daily        vitamin D 1000 units capsule     100 capsule    Take 1 capsule by mouth daily.        ZYRTEC 10 MG tablet   Generic drug:  cetirizine     90    ONE TABLET DAILY    Cough

## 2018-07-10 LAB
ANION GAP SERPL CALCULATED.3IONS-SCNC: 7 MMOL/L (ref 3–14)
BUN SERPL-MCNC: 15 MG/DL (ref 7–30)
CALCIUM SERPL-MCNC: 8.4 MG/DL (ref 8.5–10.1)
CHLORIDE SERPL-SCNC: 106 MMOL/L (ref 94–109)
CO2 SERPL-SCNC: 29 MMOL/L (ref 20–32)
CREAT SERPL-MCNC: 0.9 MG/DL (ref 0.52–1.04)
GFR SERPL CREATININE-BSD FRML MDRD: 63 ML/MIN/1.7M2
GLUCOSE SERPL-MCNC: 84 MG/DL (ref 70–99)
HIV 1+2 AB+HIV1 P24 AG SERPL QL IA: NONREACTIVE
POTASSIUM SERPL-SCNC: 4 MMOL/L (ref 3.4–5.3)
SODIUM SERPL-SCNC: 142 MMOL/L (ref 133–144)

## 2018-07-11 ENCOUNTER — TRANSFERRED RECORDS (OUTPATIENT)
Dept: HEALTH INFORMATION MANAGEMENT | Facility: CLINIC | Age: 63
End: 2018-07-11

## 2018-07-12 ENCOUNTER — TELEPHONE (OUTPATIENT)
Dept: ORTHOPEDICS | Facility: CLINIC | Age: 63
End: 2018-07-12

## 2018-07-12 NOTE — TELEPHONE ENCOUNTER
Spoke with patient.  Doing well, no questions at this time.  Offered number for nurse triage and confirmed follow up appointment.    Jeromy Arce PA-C  [facility] - Surgery

## 2018-07-24 ENCOUNTER — OFFICE VISIT (OUTPATIENT)
Dept: ORTHOPEDICS | Facility: CLINIC | Age: 63
End: 2018-07-24
Payer: COMMERCIAL

## 2018-07-24 DIAGNOSIS — Z47.89 ORTHOPEDIC AFTERCARE: Primary | ICD-10-CM

## 2018-07-24 PROCEDURE — 99024 POSTOP FOLLOW-UP VISIT: CPT | Performed by: PHYSICIAN ASSISTANT

## 2018-07-24 NOTE — MR AVS SNAPSHOT
After Visit Summary   7/24/2018    Steffi Klein    MRN: [id number]           Patient Information     Date Of Birth          1955        Visit Information        Provider Department      7/24/2018 8:00 AM Jeromy Arce PA-C [facility] ORTHOPEDIC SURGERY        Today's Diagnoses     Orthopedic aftercare    -  1      Care Instructions      Incision Care:  Sutures were removed and Steri-Strips applied in usual fashion.  Keep dry 24-48 hours.  Showering ok after that time, however no soaking or scrubbing of incision for 1 weeks.  Steri-strips will most likely fall off on their own, however they may be removed after 1 weeks with rubbing alcohol if they have not.    If draining or bleeding stops the tape-strips are enough coverage unless you were instructed otherwise or you would like to cover for comfort.  If drainage or bleeding continues please cover with clean dressings.     Gradually increase your activities as you can tolerated them, starting at a level well below what you would normally do.   Follow up as needed in clinic.            Follow-ups after your visit        Follow-up notes from your care team     Return if symptoms worsen or fail to improve.      Your next 10 appointments already scheduled     Sep 14, 2018  8:30 PM CDT   Psg Split W/Tcm with BED 3 SH SLEEP   [facility] ([facility])    [address] [id number] [phone]            Sep 25, 2018 11:30 AM CDT   Return Sleep Patient with Bennett Ezra Goltz, PA-C   [facility] ([facility])    [address] [id number] [phone]              Who to contact     If you have questions or need follow up information about today's clinic visit or your schedule please contact [facility] ORTHOPEDIC SURGERY directly at [phone].  Normal or non-critical lab and imaging results  will be communicated to you by MyChart, letter or phone within 4 business days after the clinic has received the results. If you do not hear from us within 7 days, please contact the clinic through "[employer] or phone. If you have a critical or abnormal lab result, we will notify you by phone as soon as possible.  Submit refill requests through "[employer] or call your pharmacy and they will forward the refill request to us. Please allow 3 business days for your refill to be completed.          Additional Information About Your Visit        "[employer] Information     "[employer] gives you secure access to your electronic health record. If you see a primary care provider, you can also send messages to your care team and make appointments. If you have questions, please call your primary care clinic.  If you do not have a primary care provider, please call [phone] and they will assist you.        Care EveryWhere ID     This is your Care EveryWhere ID. This could be used by other organizations to access your [facility] medical records  [id number]        Your Vitals Were     Last Period                   08/13/2007            Blood Pressure from Last 3 Encounters:   07/09/18 94/64   06/28/18 108/62   05/09/18 111/76    Weight from Last 3 Encounters:   07/09/18 184 lb (83.5 kg)   06/28/18 180 lb (81.6 kg)   05/09/18 182 lb (82.6 kg)              Today, you had the following     No orders found for display       Primary Care Provider Office Phone # Fax #    Raul Newman [id number]        Equal Access to Services     [facility] : Hadii aad ku hadasho Soomaali, waaxda luqadaha, qaybta kaalmada adeegyada, akua alvarado . So [facility] [phone].    ATENCIÓN: Si habla español, tiene a gonzalez disposición servicios gratuitos de asistencia lingüística. Llame al [phone].    We comply with applicable federal civil rights laws and [place] laws. We  do not discriminate on the basis of race, color, national origin, age, disability, sex, sexual orientation, or gender identity.            Thank you!     Thank you for choosing [facility] ORTHOPEDIC SURGERY  for your care. Our goal is always to provide you with excellent care. Hearing back from our patients is one way we can continue to improve our services. Please take a few minutes to complete the written survey that you may receive in the mail after your visit with us. Thank you!             Your Updated Medication List - Protect others around you: Learn how to safely use, store and throw away your medicines at www.disposemymeds.org.          This list is accurate as of 7/24/18  8:20 AM.  Always use your most recent med list.                   Brand Name Dispense Instructions for use Diagnosis    CERAVE Lotn      Externally apply  topically.        doxycycline monohydrate 50 MG capsule     90 capsule    TAKE 1 TO 2 CAPSULES BY MOUTH DAILY AS NEEDED    Rosacea       gabapentin 300 MG capsule    NEURONTIN    270 capsule    TAKE 1 CAPSULE(300 MG) BY MOUTH THREE TIMES DAILY    Neuropathy       levothyroxine 75 MCG tablet    SYNTHROID/LEVOTHROID    90 tablet    TAKE 1 TABLET(75 MCG) BY MOUTH DAILY    Hypothyroidism due to acquired atrophy of thyroid       Multi-vitamin Tabs tablet      Take 1 tablet by mouth daily        pantoprazole 40 MG EC tablet    PROTONIX    90 tablet    TAKE 1 TABLET BY MOUTH DAILY, 30-60 BEFORE A MEAL    Gastroesophageal reflux disease, esophagitis presence not specified       POTASSIUM CITRATE PO      Take 10 mEq by mouth daily        vitamin B complex with vitamin C Tabs tablet      Take 1 tablet by mouth daily        vitamin D 1000 units capsule     100 capsule    Take 1 capsule by mouth daily.        ZYRTEC 10 MG tablet   Generic drug:  cetirizine     90    ONE TABLET DAILY    Cough

## 2018-07-24 NOTE — PROGRESS NOTES
HISTORY OF PRESENT ILLNESS:    Steffi Klein is a 63 year old female who is seen in follow up for Left Distal Radius hardware removal, DOS 7/11/2018, Dr. Valenzuela.  Present symptoms: mild soreness.  Ready for sutures to be removed.  No pain medication.  Concerns about distal incision puckering.   Denies Chest pain, Calve pain, Fever, Chills.    Current Treatment: postop, sutures.    PHYSICAL EXAM:  LMP 08/13/2007  There is no height or weight on file to calculate BMI.   GENERAL APPEARANCE: healthy, alert and no distress   PSYCH:  mentation appears normal and affect normal/bright    MSK:  Left: Volar wrist .  Ambulates: WNG.  Incision clean and dry, sutures. present, healing.  Appropriate incisional erythema.   Yes Ecchymosis mild at volar arm, resolving..n.  Edema min.  CMS: natalie incisional numbness, otherwise grossly intact to hand.  AROM mild decrease wrist F/E.      IMAGING INTERPRETATION:  None..     ASSESSMENT:  Steffi Klein is a 63 year old female S/P Left Distal Radius hardware removal, DOS 7/11/2018, Dr. Valenzuela..    Healing incision.    PLAN:  - Surgery discussed, images reviewed if applicable, and all questions were answered at this time.  - sutures removed with sterile technique, steri-strips applied in usual fashion, care instructions given and verbally acknowledged.  - Medications: none.  - ROM exercises  - hardware presented to pt.    Return to clinic PRN.    Jeromy Arce PA-C    Dept. Orthopedic Surgery  [facility]   7/24/2018

## 2018-08-21 ENCOUNTER — MYC REFILL (OUTPATIENT)
Dept: FAMILY MEDICINE | Facility: CLINIC | Age: 63
End: 2018-08-21

## 2018-08-21 DIAGNOSIS — L71.9 ROSACEA: ICD-10-CM

## 2018-08-22 RX ORDER — DOXYCYCLINE 50 MG/1
CAPSULE ORAL
Qty: 90 CAPSULE | Refills: 2 | Status: SHIPPED | OUTPATIENT
Start: 2018-08-22 | End: 2019-08-05

## 2018-08-22 NOTE — TELEPHONE ENCOUNTER
Requested Prescriptions   Pending Prescriptions Disp Refills     doxycycline monohydrate 50 MG capsule 90 capsule 3    There is no refill protocol information for this order        Last Written Prescription Date:  9/11/17  Last Fill Quantity: 90,  # refills: 3   Last Office Visit: 7/9/2018   Future Office Visit:     Associated Diagnoses   Rosacea [L71.9]

## 2018-08-22 NOTE — TELEPHONE ENCOUNTER
Message from [employer]:  Original authorizing provider: MD Steffi Hair would like a refill of the following medications:  doxycycline Monohydrate 50 MG CAPS capsule [Raul Newman MD]    Preferred pharmacy: [facility] [address] [place] [address] [id number]    Comment:

## 2018-09-14 ENCOUNTER — THERAPY VISIT (OUTPATIENT)
Dept: SLEEP MEDICINE | Facility: CLINIC | Age: 63
End: 2018-09-14
Payer: COMMERCIAL

## 2018-09-14 DIAGNOSIS — R06.83 SNORING: ICD-10-CM

## 2018-09-14 DIAGNOSIS — R79.81 ABNORMAL PULSE OXIMETRY: ICD-10-CM

## 2018-09-14 DIAGNOSIS — G47.10 HYPERSOMNIA: ICD-10-CM

## 2018-09-14 PROCEDURE — 95810 POLYSOM 6/> YRS 4/> PARAM: CPT | Performed by: PSYCHIATRY & NEUROLOGY

## 2018-09-14 NOTE — MR AVS SNAPSHOT
After Visit Summary   9/14/2018    Steffi Klein    MRN: [id number]           Patient Information     Date Of Birth          1955        Visit Information        Provider Department      9/14/2018 8:30 PM BED 3 SH SLEEP [facility]        Today's Diagnoses     Abnormal pulse oximetry        Snoring        Hypersomnia          Care Instructions    Diagnostic PSG completed per provider order.  Patient did not meet criteria for PAP therapy.          Follow-ups after your visit        Your next 10 appointments already scheduled     Sep 25, 2018 11:30 AM CDT   Return Sleep Patient with Bennett Ezra Goltz, PA-C   [facility] ([facility])    [address] [address] [phone]              Who to contact     If you have questions or need follow up information about today's clinic visit or your schedule please contact [facility] directly at [phone].  Normal or non-critical lab and imaging results will be communicated to you by [employer], letter or phone within 4 business days after the clinic has received the results. If you do not hear from us within 7 days, please contact the clinic through [employer] or phone. If you have a critical or abnormal lab result, we will notify you by phone as soon as possible.  Submit refill requests through [employer] or call your pharmacy and they will forward the refill request to us. Please allow 3 business days for your refill to be completed.          Additional Information About Your Visit        [employer] Information     [employer] gives you secure access to your electronic health record. If you see a primary care provider, you can also send messages to your care team and make appointments. If you have questions, please call your primary care clinic.  If you do not have a primary care provider, please call [phone] and they will assist you.        Care  EveryWhere ID     This is your Care EveryWhere ID. This could be used by other organizations to access your [facility] [place] medical records  [id number]        Your Vitals Were     Last Period                   08/13/2007            Blood Pressure from Last 3 Encounters:   07/09/18 94/64   06/28/18 108/62   05/09/18 111/76    Weight from Last 3 Encounters:   07/09/18 83.5 kg (184 lb)   06/28/18 81.6 kg (180 lb)   05/09/18 82.6 kg (182 lb)              We Performed the Following     Comprehensive Sleep Study        Primary Care Provider Office Phone # Fax #    Raul Newman [phone] [address] [facility] [address]        Equal Access to Services     KWESI OCONNOR : Hadii aad ku hadasho Soomaali, waaxda luqadaha, qaybta kaalmada adeegyada, waxcandy alvarado . So [facility] [phone].    ATENCIÓN: Si habla español, tiene a gonzalez disposición servicios gratuitos de asistencia lingüística. [facility] [phone].    We comply with applicable federal civil rights laws and [place] laws. We do not discriminate on the basis of race, color, national origin, age, disability, sex, sexual orientation, or gender identity.            Thank you!     Thank you for choosing [facility]  for your care. Our goal is always to provide you with excellent care. Hearing back from our patients is one way we can continue to improve our services. Please take a few minutes to complete the written survey that you may receive in the mail after your visit with us. Thank you!             Your Updated Medication List - Protect others around you: Learn how to safely use, store and throw away your medicines at www.disposemymeds.org.          This list is accurate as of 9/14/18 11:59 PM.  Always use your most recent med list.                   Brand Name Dispense Instructions for use Diagnosis    CERAVE Lotomar      Externally apply  topically.        doxycycline monohydrate 50 MG capsule     90  capsule    TAKE 1 TO 2 CAPSULES BY MOUTH DAILY AS NEEDED    Rosacea       gabapentin 300 MG capsule    NEURONTIN    270 capsule    TAKE 1 CAPSULE(300 MG) BY MOUTH THREE TIMES DAILY    Neuropathy       levothyroxine 75 MCG tablet    SYNTHROID/LEVOTHROID    90 tablet    TAKE 1 TABLET(75 MCG) BY MOUTH DAILY    Hypothyroidism due to acquired atrophy of thyroid       Multi-vitamin Tabs tablet      Take 1 tablet by mouth daily        pantoprazole 40 MG EC tablet    PROTONIX    90 tablet    TAKE 1 TABLET BY MOUTH DAILY, 30-60 BEFORE A MEAL    Gastroesophageal reflux disease, esophagitis presence not specified       POTASSIUM CITRATE PO      Take 10 mEq by mouth daily        vitamin B complex with vitamin C Tabs tablet      Take 1 tablet by mouth daily        vitamin D 1000 units capsule     100 capsule    Take 1 capsule by mouth daily.        ZYRTEC 10 MG tablet   Generic drug:  cetirizine     90    ONE TABLET DAILY    Cough

## 2018-09-20 NOTE — PROCEDURES
SLEEP STUDY INTERPRETATION  DIAGNOSTIC POLYSOMNOGRAPHY REPORT      Patient: PEGGY PINO  YOB: 1955  Study Date: 9/14/2018  MRN: [id number]  Referring Provider: MD Newman Brian  Ordering Provider: Goltz, PA-C, Bennett    Indications for Polysomnography: The patient is a 63 y old Female, [place] sleepiness scale 17.0 and neck circumference is 37.0 cm. Relevant medical history includes obesity, snoring, witnessed apneas. A diagnostic polysomnogram was performed to evaluate for sleep apnea/hypoventilation/hypoxemia.    Polysomnogram Data: A full night polysomnogram recorded the standard physiologic parameters including EEG, EOG, EMG, ECG, nasal and oral airflow. Respiratory parameters of chest and abdominal movements were recorded with respiratory inductance plethysmography. Oxygen saturation was recorded by pulse oximetry. Hypopnea scoring rule used: 1B 4%.    Sleep Architecture: Sleep fragmentation  The total recording time of the polysomnogram was 494.8 minutes. The total sleep time was 438.5 minutes. Sleep latency was 23.3 minutes. REM latency was 56.5 minutes. Arousal index was 26.3 arousals per hour. Sleep efficiency was 88.6%. Wake after sleep onset was 32.0 minutes. The patient spent 2.6% of total sleep time in Stage N1, 52.6% in Stage N2, 25.4% in Stage N3, and 19.4% in REM. Time in REM supine was - minutes.    Respiration: Moderate JEROD with mild hypoventilation and hypoxemia    Events ? The polysomnogram revealed a presence of 81 obstructive, 15 central, and 7 mixed apneas resulting in an apnea index of 14.1 events per hour. There were 47 obstructive hypopneas and - central hypopneas resulting in an obstructive hypopnea index of 6.4 and central hypopnea index of - events per hour. The combined apnea/hypopnea index was 20.5 events per hour (central apnea/hypopnea index was 2.1 events per hour). The REM AHI was 50.1 events per hour. The supine AHI was - events per hour. The RERA index was  7.0 events per hour.  The RDI was 27.5 events per hour.    Snoring - was reported as loud.    Respiratory rate and pattern - was notable for normal respiratory rate and pattern.    Sustained Sleep Associated Hypoventilation - Transcutaneous carbon dioxide monitoring was used, mild hypoventilation was suggested with a maximum change from 44.7 to 55.7 mmHg and 1.9 minutes at or greater than 55 mmHg.    Sleep Associated Hypoxemia - (Greater than 5 minutes O2 sat at or below 88%) was present. Baseline oxygen saturation was 93.9%. Lowest oxygen saturation was 79.1%. Time spent less than or equal to 88% was 5.4 minutes. Time spent less than or equal to 89% was 8.1 minutes.    Movement Activity:     Periodic Limb Activity - There were 0 PLMs during the entire study.    REM EMG Activity - Excessive muscle activity was not present.    Nocturnal Behavior - Abnormal sleep related behaviors were not noted.    Bruxism - None apparent.    Cardiac Summary: Sinus, intermittent tachycardia  The average pulse rate was 67.9 bpm. The minimum pulse rate was 48.7 bpm while the maximum pulse rate was 102.0 bpm.      Assessment:     Moderate JEROD with mild hypoventilation and hypoxemia    Recommendations:    Considering the presence of hypoventilation ecommend repeat polysomnography with full night titration of positive airway pressure therapy for the control of sleep disordered breathing.    Advice regarding the risks of drowsy driving.    Suggest optimizing sleep schedule and avoiding sleep deprivation.    Weight management    Diagnostic Code(s): G47.33, G47.36      Jeremiah Fleming MD 9-

## 2018-09-25 ENCOUNTER — OFFICE VISIT (OUTPATIENT)
Dept: SLEEP MEDICINE | Facility: CLINIC | Age: 63
End: 2018-09-25
Payer: COMMERCIAL

## 2018-09-25 VITALS
OXYGEN SATURATION: 98 % | WEIGHT: 187 LBS | TEMPERATURE: 97.7 F | RESPIRATION RATE: 16 BRPM | HEIGHT: 63 IN | SYSTOLIC BLOOD PRESSURE: 116 MMHG | HEART RATE: 52 BPM | BODY MASS INDEX: 33.13 KG/M2 | DIASTOLIC BLOOD PRESSURE: 72 MMHG

## 2018-09-25 DIAGNOSIS — G47.34 NOCTURNAL HYPOXEMIA: ICD-10-CM

## 2018-09-25 DIAGNOSIS — R06.89 HYPOVENTILATION: ICD-10-CM

## 2018-09-25 DIAGNOSIS — G47.30 OBSERVED SLEEP APNEA: Primary | ICD-10-CM

## 2018-09-25 PROCEDURE — 99214 OFFICE O/P EST MOD 30 MIN: CPT | Mod: 24 | Performed by: PHYSICIAN ASSISTANT

## 2018-09-25 NOTE — MR AVS SNAPSHOT
After Visit Summary   9/25/2018    Steffi Klein    MRN: [id number]           Patient Information     Date Of Birth          1955        Visit Information        Provider Department      9/25/2018 11:30 AM Goltz, Bennett Ezra, PA-C [facility] [place]        Today's Diagnoses     Observed sleep apnea    -  1    Nocturnal hypoxemia        Hypoventilation          Care Instructions      Your BMI is Body mass index is 33.39 kg/(m^2).  Weight management is a personal decision.  If you are interested in exploring weight loss strategies, the following discussion covers the approaches that may be successful. Body mass index (BMI) is one way to tell whether you are at a healthy weight, overweight, or obese. It measures your weight in relation to your height.  A BMI of 18.5 to 24.9 is in the healthy range. A person with a BMI of 25 to 29.9 is considered overweight, and someone with a BMI of 30 or greater is considered obese. More than two-thirds of American adults are considered overweight or obese.  Being overweight or obese increases the risk for further weight gain. Excess weight may lead to heart disease and diabetes.  Creating and following plans for healthy eating and physical activity may help you improve your health.  Weight control is part of healthy lifestyle and includes exercise, emotional health, and healthy eating habits. Careful eating habits lifelong are the mainstay of weight control. Though there are significant health benefits from weight loss, long-term weight loss with diet alone may be very difficult to achieve- studies show long-term success with dietary management in less than 10% of people. Attaining a healthy weight may be especially difficult to achieve in those with severe obesity. In some cases, medications, devices and surgical management might be considered.  What can you do?  If you are overweight or obese and are interested in methods for weight loss, you  should discuss this with your provider.     Consider reducing daily calorie intake by 500 calories.     Keep a food journal.     Avoiding skipping meals, consider cutting portions instead.    Diet combined with exercise helps maintain muscle while optimizing fat loss. Strength training is particularly important for building and maintaining muscle mass. Exercise helps reduce stress, increase energy, and improves fitness. Increasing exercise without diet control, however, may not burn enough calories to loose weight.       Start walking three days a week 10-20 minutes at a time    Work towards walking thirty minutes five days a week     Eventually, increase the speed of your walking for 1-2 minutes at time    In addition, we recommend that you review healthy lifestyles and methods for weight loss available through the National Institutes of Health patient information sites:  http://win.niddk.nih.gov/publications/index.htm    And look into health and wellness programs that may be available through your health insurance provider, employer, local community center, or robert club.    Weight management plan: Patient was referred to their PCP to discuss a diet and exercise plan.              Follow-ups after your visit        Your next 10 appointments already scheduled     Oct 27, 2018  8:30 PM CDT   PSG Titration w/TCM with BED 2 SH SLEEP   [facility] ([facility] [place])    [address] [address]   [phone]            Nov 07, 2018  4:00 PM CST   Return Sleep Patient with Bennett Ezra Goltz, PA-C   [facility] ([facility] [place])    [address] [address]   [phone]              Future tests that were ordered for you today     Open Future Orders        Priority Expected Expires Ordered    Comprehensive Sleep Study Routine  3/24/2019 9/25/2018            Who to contact     If you have  "questions or need follow up information about today's clinic visit or your schedule please contact [facility] directly at [phone].  Normal or non-critical lab and imaging results will be communicated to you by MyChart, letter or phone within 4 business days after the clinic has received the results. If you do not hear from us within 7 days, please contact the clinic through MyChart or phone. If you have a critical or abnormal lab result, we will notify you by phone as soon as possible.  Submit refill requests through [employer] or call your pharmacy and they will forward the refill request to us. Please allow 3 business days for your refill to be completed.          Additional Information About Your Visit        [employer] Information     [employer] gives you secure access to your electronic health record. If you see a primary care provider, you can also send messages to your care team and make appointments. If you have questions, please call your primary care clinic.  If you do not have a primary care provider, please call [phone] and they will assist you.        Care EveryWhere ID     This is your Care EveryWhere ID. This could be used by other organizations to access your [place] medical records  [id number]        Your Vitals Were     Pulse Temperature Respirations Height Last Period Pulse Oximetry    52 97.7  F (36.5  C) (Oral) 16 1.594 m (5' 2.75\") 08/13/2007 98%    BMI (Body Mass Index)                   33.39 kg/m2            Blood Pressure from Last 3 Encounters:   09/25/18 116/72   07/09/18 94/64   06/28/18 108/62    Weight from Last 3 Encounters:   09/25/18 84.8 kg (187 lb)   07/09/18 83.5 kg (184 lb)   06/28/18 81.6 kg (180 lb)               Primary Care Provider Office Phone # Fax #    Raul Newman [phone] [address] [facility] [address]        Equal Access to Services     KWESI OCONNOR AH: Basim Barlow, waaxda luqadafede, qaybta " akua trevino joshua douglas. So [facility] [phone].    ATENCIÓN: Si leida ware, tiene a gonzalez disposición servicios gratuitos de asistencia lingüística. Odalis al [phone].    We comply with applicable federal civil rights laws and [place] laws. We do not discriminate on the basis of race, color, national origin, age, disability, sex, sexual orientation, or gender identity.            Thank you!     Thank you for choosing [facility]  for your care. Our goal is always to provide you with excellent care. Hearing back from our patients is one way we can continue to improve our services. Please take a few minutes to complete the written survey that you may receive in the mail after your visit with us. Thank you!             Your Updated Medication List - Protect others around you: Learn how to safely use, store and throw away your medicines at www.disposemymeds.org.          This list is accurate as of 9/25/18 12:23 PM.  Always use your most recent med list.                   Brand Name Dispense Instructions for use Diagnosis    CERAVE Lotn      Externally apply  topically.        doxycycline monohydrate 50 MG capsule     90 capsule    TAKE 1 TO 2 CAPSULES BY MOUTH DAILY AS NEEDED    Rosacea       gabapentin 300 MG capsule    NEURONTIN    270 capsule    TAKE 1 CAPSULE(300 MG) BY MOUTH THREE TIMES DAILY    Neuropathy       levothyroxine 75 MCG tablet    SYNTHROID/LEVOTHROID    90 tablet    TAKE 1 TABLET(75 MCG) BY MOUTH DAILY    Hypothyroidism due to acquired atrophy of thyroid       Multi-vitamin Tabs tablet      Take 1 tablet by mouth daily        pantoprazole 40 MG EC tablet    PROTONIX    90 tablet    TAKE 1 TABLET BY MOUTH DAILY, 30-60 BEFORE A MEAL    Gastroesophageal reflux disease, esophagitis presence not specified       POTASSIUM CITRATE PO      Take 10 mEq by mouth daily        vitamin B complex with vitamin C Tabs tablet      Take 1 tablet by mouth daily         vitamin D 1000 units capsule     100 capsule    Take 1 capsule by mouth daily.        ZYRTEC 10 MG tablet   Generic drug:  cetirizine     90    ONE TABLET DAILY    Cough

## 2018-09-25 NOTE — PROGRESS NOTES
Sleep Study Follow-Up Visit:    Date on this visit: 9/25/2018    Steffi Klein comes in today for follow-up of her sleep study done on 9/14/2018 at the [facility] for excessive sleepiness for at least 6 months. She wonders if she has had some degree of sleepiness for most of her life though. Her medical history is significant for anxiety, GERD, neuropathy, hypothyroidism, IBS, macrocytosis.    Sleep latency 23.3 minutes without Ambien.  REM achieved.   REM latency 56.5 minutes.  Sleep efficiency 88.6%. Total sleep time 438.5 minutes.    Sleep architecture:  Stage 1, 2.6% (5%), stage 2, 52.6% (45-55%), stage 3, 25.4% (15-20%), stage REM, 19.4% (20-25%).  AHI was 20.5/hr (10% centrals), with desaturations down to 79%. S/He spent 8.1 minutes below 90% SpO2 and 5.4 minutes below 89%. RDI 27.5/hr.  REM RDI 56.5/hr, consistent with severe REM JEROD.  Supine RDI N/A.  Periodic Limb Movement Index 0/hour.   Her CO2 ranged from 44 to 55 mmHg, consistent with mild hypoventilation.    CPAP titration:  CPAP was not initiated. These findings were reviewed with the patient.    She had a normal chest CT and ECHO in 2009.     Past medical/surgical history, family history, social history, medications and allergies were reviewed.      Problem List:  Patient Active Problem List    Diagnosis Date Noted     Hypotension, unspecified hypotension type 07/09/2018     Priority: Medium     Bradycardia 07/09/2018     Priority: Medium     Abnormal pulse oximetry 07/09/2018     Priority: Medium     Macrocytosis 04/22/2018     Priority: Medium     Onychomycosis 12/14/2017     Priority: Medium     Non morbid obesity due to excess calories 07/18/2017     Priority: Medium     Rosacea 07/13/2015     Priority: Medium     Hypothyroidism 07/02/2014     Priority: Medium     Neuropathy 06/30/2014     Priority: Medium     R foot post op       CARDIOVASCULAR SCREENING; LDL GOAL LESS THAN 160 10/31/2010     Priority: Medium     2.4% 10 yr  risk 2015       Anxiety 12/28/2009     Priority: Medium     Esophageal reflux 07/02/2006     Priority: Medium     Famotidine (pepcid) 20 or ranitidine(zantac) 150 failure          Impression/Plan:    (G47.30) Observed sleep apnea  (primary encounter diagnosis), (G47.34) Nocturnal hypoxemia, (R06.89) Hypoventilation  Comment: This sleep study showed moderate JEROD with an AHI of 20/hr. She had an O2 danika of 79%. Her REM RDI was 56/hr. She spent 5.4 minutes below 89% and her CO2 ranged from 44 to 55 mmHg, suggesting borderline hypoxemia and hypoventilation.   Plan: Comprehensive Sleep Study        Full night CPAP/BiPAP titration study with TCM.       She will follow up with me in about 2 week(s).     Twenty-five minutes spent with patient, all of which were spent face-to-face counseling, consulting, coordinating plan of care.      Bennett Goltz, PA-C    CC: Raul Newman MD

## 2018-09-25 NOTE — NURSING NOTE
"Chief Complaint   Patient presents with     Study Results     Follow up psg results       Initial /72  Pulse 52  Temp 97.7  F (36.5  C) (Oral)  Resp 16  Ht 1.594 m (5' 2.75\")  Wt 84.8 kg (187 lb)  LMP 08/13/2007  SpO2 98%  BMI 33.39 kg/m2 Estimated body mass index is 33.39 kg/(m^2) as calculated from the following:    Height as of this encounter: 1.594 m (5' 2.75\").    Weight as of this encounter: 84.8 kg (187 lb).    Medication Reconciliation: complete        Dara Leyva MA      "

## 2018-09-25 NOTE — PATIENT INSTRUCTIONS

## 2018-10-27 ENCOUNTER — THERAPY VISIT (OUTPATIENT)
Dept: SLEEP MEDICINE | Facility: CLINIC | Age: 63
End: 2018-10-27
Payer: COMMERCIAL

## 2018-10-27 DIAGNOSIS — G47.33 OSA (OBSTRUCTIVE SLEEP APNEA): ICD-10-CM

## 2018-10-27 DIAGNOSIS — G47.30 OBSERVED SLEEP APNEA: ICD-10-CM

## 2018-10-27 DIAGNOSIS — G47.34 NOCTURNAL HYPOXEMIA: ICD-10-CM

## 2018-10-27 DIAGNOSIS — R06.89 HYPOVENTILATION: ICD-10-CM

## 2018-10-27 PROCEDURE — 95811 POLYSOM 6/>YRS CPAP 4/> PARM: CPT | Performed by: INTERNAL MEDICINE

## 2018-10-27 NOTE — MR AVS SNAPSHOT
After Visit Summary   10/27/2018    Steffi Klein    MRN: [id number]           Patient Information     Date Of Birth          1955        Visit Information        Provider Department      10/27/2018 8:30 PM BED 2 SH SLEEP [facility]        Today's Diagnoses     Observed sleep apnea        Nocturnal hypoxemia        Hypoventilation           Follow-ups after your visit        Your next 10 appointments already scheduled     Nov 07, 2018  4:00 PM CST   Return Sleep Patient with Bennett Ezra Goltz, PA-C   [facility] ([facility])    [address] [address] [phone]              Who to contact     If you have questions or need follow up information about today's clinic visit or your schedule please contact [facility] directly at [phone].  Normal or non-critical lab and imaging results will be communicated to you by [employer], letter or phone within 4 business days after the clinic has received the results. If you do not hear from us within 7 days, please contact the clinic through [employer] or phone. If you have a critical or abnormal lab result, we will notify you by phone as soon as possible.  Submit refill requests through [employer] or call your pharmacy and they will forward the refill request to us. Please allow 3 business days for your refill to be completed.          Additional Information About Your Visit        MyChart Information     [employer] gives you secure access to your electronic health record. If you see a primary care provider, you can also send messages to your care team and make appointments. If you have questions, please call your primary care clinic.  If you do not have a primary care provider, please call [phone] and they will assist you.        Care EveryWhere ID     This is your Care EveryWhere ID. This could be used by other organizations to access your  [place] medical records  [id number]        Your Vitals Were     Last Period                   08/13/2007            Blood Pressure from Last 3 Encounters:   09/25/18 116/72   07/09/18 94/64   06/28/18 108/62    Weight from Last 3 Encounters:   09/25/18 84.8 kg (187 lb)   07/09/18 83.5 kg (184 lb)   06/28/18 81.6 kg (180 lb)              We Performed the Following     Comprehensive Sleep Study        Primary Care Provider Office Phone # Fax #    Raul Newman [phone] [address] [facility] [address]        Equal Access to Services     [facility] : Hadii aad ku hadasho Soisamarali, waaxda luqadaha, qaybta kaalmada adelayneyada, akua alvarado . So [facility] [phone].    ATENCIÓN: Si habla español, tiene a gonzalez disposición servicios gratuitos de asistencia lingüística. [facility] [phone].    We comply with applicable federal civil rights laws and [place] laws. We do not discriminate on the basis of race, color, national origin, age, disability, sex, sexual orientation, or gender identity.            Thank you!     Thank you for choosing [facility]  for your care. Our goal is always to provide you with excellent care. Hearing back from our patients is one way we can continue to improve our services. Please take a few minutes to complete the written survey that you may receive in the mail after your visit with us. Thank you!             Your Updated Medication List - Protect others around you: Learn how to safely use, store and throw away your medicines at www.disposemymeds.org.          This list is accurate as of 10/27/18 11:59 PM.  Always use your most recent med list.                   Brand Name Dispense Instructions for use Diagnosis    CERAVE Lotn      Externally apply  topically.        doxycycline monohydrate 50 MG capsule     90 capsule    TAKE 1 TO 2 CAPSULES BY MOUTH DAILY AS NEEDED    Rosacea       gabapentin 300 MG capsule     NEURONTIN    270 capsule    TAKE 1 CAPSULE(300 MG) BY MOUTH THREE TIMES DAILY    Neuropathy       levothyroxine 75 MCG tablet    SYNTHROID/LEVOTHROID    90 tablet    TAKE 1 TABLET(75 MCG) BY MOUTH DAILY    Hypothyroidism due to acquired atrophy of thyroid       Multi-vitamin Tabs tablet      Take 1 tablet by mouth daily        pantoprazole 40 MG EC tablet    PROTONIX    90 tablet    TAKE 1 TABLET BY MOUTH DAILY, 30-60 BEFORE A MEAL    Gastroesophageal reflux disease, esophagitis presence not specified       POTASSIUM CITRATE PO      Take 10 mEq by mouth daily        vitamin B complex with vitamin C Tabs tablet      Take 1 tablet by mouth daily        vitamin D 1000 units capsule     100 capsule    Take 1 capsule by mouth daily.        ZYRTEC 10 MG tablet   Generic drug:  cetirizine     90    ONE TABLET DAILY    Cough

## 2018-10-28 NOTE — PROGRESS NOTES
Completed a all night titration PSG per provider order.    Preliminary AHI >20.  A final therapeutic PAP pressure was achieved.    Supine REM was seen on therapeutic pressure.    Patient reports feeling not refreshed in AM.

## 2018-10-29 ENCOUNTER — OFFICE VISIT (OUTPATIENT)
Dept: FAMILY MEDICINE | Facility: CLINIC | Age: 63
End: 2018-10-29
Payer: COMMERCIAL

## 2018-10-29 VITALS
DIASTOLIC BLOOD PRESSURE: 67 MMHG | TEMPERATURE: 98 F | HEART RATE: 69 BPM | OXYGEN SATURATION: 96 % | RESPIRATION RATE: 16 BRPM | BODY MASS INDEX: 34.02 KG/M2 | HEIGHT: 63 IN | WEIGHT: 192 LBS | SYSTOLIC BLOOD PRESSURE: 99 MMHG

## 2018-10-29 DIAGNOSIS — Z23 NEED FOR PROPHYLACTIC VACCINATION AND INOCULATION AGAINST INFLUENZA: ICD-10-CM

## 2018-10-29 DIAGNOSIS — M77.11 LATERAL EPICONDYLITIS OF RIGHT ELBOW: Primary | ICD-10-CM

## 2018-10-29 PROCEDURE — 99213 OFFICE O/P EST LOW 20 MIN: CPT | Mod: 25 | Performed by: FAMILY MEDICINE

## 2018-10-29 PROCEDURE — 90471 IMMUNIZATION ADMIN: CPT | Performed by: FAMILY MEDICINE

## 2018-10-29 PROCEDURE — 90682 RIV4 VACC RECOMBINANT DNA IM: CPT | Performed by: FAMILY MEDICINE

## 2018-10-29 RX ORDER — METHYLPREDNISOLONE 4 MG
TABLET, DOSE PACK ORAL
Qty: 21 TABLET | Refills: 0 | Status: SHIPPED | OUTPATIENT
Start: 2018-10-29 | End: 2018-11-07

## 2018-10-29 ASSESSMENT — PAIN SCALES - GENERAL: PAINLEVEL: SEVERE PAIN (7)

## 2018-10-29 NOTE — MR AVS SNAPSHOT
After Visit Summary   10/29/2018    Steffi Klein    MRN: [id number]           Patient Information     Date Of Birth          1955        Visit Information        Provider Department      10/29/2018 3:20 PM Sade Gallagher MD [facility]        Today's Diagnoses     Lateral epicondylitis of right elbow    -  1      Care Instructions      Understanding Lateral Epicondylitis    Tendons are strong bands of tissue that connect muscles to bones. Lateral epicondylitis affects the tendons that connect muscles in the forearm to the lateral epicondyle. This is the bony knob on the outer side of the elbow. The condition occurs if the extensor tendons of the wrist become red and swollen (irritated). This can cause pain in the elbow, forearm, and wrist. Because the condition is sometimes caused by playing tennis, it is also known as  tennis elbow.   How to say it  LA-tuhr-blade [id number]-[id number]-LY-tis   What causes lateral epicondylitis?  The condition most often occurs because of overuse. This can be from any activity that repeatedly puts stress on the forearm extensor muscles or tendons and wrist. For instance, playing tennis, lifting weights, cutting meat, painting, and typing can all cause the condition. Wear and tear of the tendons from aging or an injury to the tendons can also cause the condition.  Symptoms of lateral epicondylitis  The most common symptom is pain. You may feel it on the outer side of the elbow and down the back of the forearm. It may be worse when moving or using the elbow, forearm, or wrist. You may also feel pain when gripping or lifting things.  Treatment for lateral epicondylitis  Treatments may include:    Resting the elbow, forearm, and wrist. You ll need to avoid movements that can make your symptoms worse. You also may need to avoid certain sports and types of work for a time. This helps relieve symptoms and prevent further damage to the  tendons.    Changing the action that caused the problem. For instance, if the tendons were damaged from playing tennis, it may help to change your playing technique or use different equipment. This helps prevent further damage to the tendons.    Using cold packs. Putting an ice pack on the injured area can help reduce pain and swelling.    Taking pain medicines. Taking prescription or over-the-counter pain medicines may help reduce pain and swelling.      Wearing a brace. This helps reduce strain on the muscles and tendons in the forearm, which may relieve symptoms. It is very important to wear the brace properly.    Doing exercises and physical therapy. These help improve strength and range of motion in the elbow, forearm, and wrist.    Getting shots of medicine into the injured area. These may help relieve symptoms for a time.    Having surgery. This may be an option if other treatments fail to relieve symptoms. In many cases, the surgeon removes the damaged tissue.  Possible complications of lateral epicondylitis  If the tendons involved don t heal properly, symptoms may return or get worse. To help prevent this, follow your treatment plan as directed.  When to call your healthcare provider  Call your healthcare provider right away if you have any of these:    Fever of 100.4 F (38 C) or higher, or as directed    Redness, swelling, or warmth in the elbow or forearm that gets worse    Symptoms that don t get better with treatment, or get worse    New symptoms   Date Last Reviewed: 3/10/2016    7517-1915 The "[employer]". [address] [place], [place] [address]. All rights reserved. This information is not intended as a substitute for professional medical care. Always follow your healthcare professional's instructions.                Follow-ups after your visit        Additional Services     [facility], HAND, AND CHIROPRACTIC REFERRAL       Physical Therapy, Hand Therapy and Chiropractic Care are available  through:  *[facility]  *Hand Therapy (Occupational Therapy or Physical Therapy)  *[facility]    Call one number to schedule at any of the above locations: [phone].    Physical therapy, Hand therapy and/or Chiropractic care has been recommended by your physician as an excellent treatment option to reduce pain and help people return to normal activities, including sports.  Therapy and/or chiropractic care services are a great complement or alternative to expensive and invasive surgery, injections, or long-term use of prescription medications. The primary goal is to identify the underlying problem and provide you the tools to manage your condition on your own.     Please be aware that coverage of these services is subject to the terms and limitations of your health insurance plan.  Call member services at your health plan with any benefit or coverage questions.      Please bring the following to your appointment:  *Your personal calendar for scheduling future appointments  *Comfortable clothing                  Follow-up notes from your care team     Return in about 2 weeks (around 11/12/2018).      Your next 10 appointments already scheduled     Nov 07, 2018  4:00 PM CST   Return Sleep Patient with Bennett Ezra Goltz, PA-C   [facility] ([facility])    [address] [place] [id number] [phone]              Future tests that were ordered for you today     Open Future Orders        Priority Expected Expires Ordered    [facility] PT, HAND, AND CHIROPRACTIC REFERRAL Routine  10/29/2019 10/29/2018            Who to contact     If you have questions or need follow up information about today's clinic visit or your schedule please contact [facility] directly at [phone].  Normal or non-critical lab and imaging results will be communicated to you by MyChart, letter or phone within 4 business  "days after the clinic has received the results. If you do not hear from us within 7 days, please contact the clinic through [employer] or phone. If you have a critical or abnormal lab result, we will notify you by phone as soon as possible.  Submit refill requests through [employer] or call your pharmacy and they will forward the refill request to us. Please allow 3 business days for your refill to be completed.          Additional Information About Your Visit        [employer] Information     [employer] gives you secure access to your electronic health record. If you see a primary care provider, you can also send messages to your care team and make appointments. If you have questions, please call your primary care clinic.  If you do not have a primary care provider, please call [phone] and they will assist you.        Care EveryWhere ID     This is your Care EveryWhere ID. This could be used by other organizations to access your [facility] records  [id number]        Your Vitals Were     Pulse Temperature Respirations Height Last Period Pulse Oximetry    69 98  F (36.7  C) (Oral) 16 5' 2.75\" (1.594 m) 08/13/2007 96%    Breastfeeding? BMI (Body Mass Index)                No 34.28 kg/m2           Blood Pressure from Last 3 Encounters:   10/29/18 99/67   09/25/18 116/72   07/09/18 94/64    Weight from Last 3 Encounters:   10/29/18 192 lb (87.1 kg)   09/25/18 187 lb (84.8 kg)   07/09/18 184 lb (83.5 kg)                 Today's Medication Changes          These changes are accurate as of 10/29/18  3:56 PM.  If you have any questions, ask your nurse or doctor.               Start taking these medicines.        Dose/Directions    methylPREDNISolone 4 MG tablet   Commonly known as:  MEDROL DOSEPAK   Used for:  Lateral epicondylitis of right elbow        Follow package instructions   Quantity:  21 tablet   Refills:  0       order for DME   Used for:  Lateral epicondylitis of right elbow        Equipment being ordered: elbow " brace   Quantity:  1 Package   Refills:  0            Where to get your medicines      These medications were sent to [facility] - [place], [place] - [address]  [address], [facility] [address]     Phone:  [phone]     methylPREDNISolone 4 MG tablet         Some of these will need a paper prescription and others can be bought over the counter.  Ask your nurse if you have questions.     Bring a paper prescription for each of these medications     order for DME                Primary Care Provider Office Phone # Fax #    Raul Newman [phone] [address]  [facility] [address]        Equal Access to Services     KWESI OCONNOR : Hadii meri kelly hadasho Soisamarali, waaxda luqadaha, qaybta kaalmada adeegyada, akua alvarado . So [facility] [phone].    ATENCIÓN: Si habla español, tiene a gonzalez disposición servicios gratuitos de asistencia lingüística. Llame al [phone].    We comply with applicable federal civil rights laws and [place] laws. We do not discriminate on the basis of race, color, national origin, age, disability, sex, sexual orientation, or gender identity.            Thank you!     Thank you for choosing [facility]  for your care. Our goal is always to provide you with excellent care. Hearing back from our patients is one way we can continue to improve our services. Please take a few minutes to complete the written survey that you may receive in the mail after your visit with us. Thank you!             Your Updated Medication List - Protect others around you: Learn how to safely use, store and throw away your medicines at www.disposemymeds.org.          This list is accurate as of 10/29/18  3:56 PM.  Always use your most recent med list.                   Brand Name Dispense Instructions for use Diagnosis    SANJNAA Mujica      Externally apply  topically.         doxycycline monohydrate 50 MG capsule     90 capsule    TAKE 1 TO 2 CAPSULES BY MOUTH DAILY AS NEEDED    Rosacea       gabapentin 300 MG capsule    NEURONTIN    270 capsule    TAKE 1 CAPSULE(300 MG) BY MOUTH THREE TIMES DAILY    Neuropathy       levothyroxine 75 MCG tablet    SYNTHROID/LEVOTHROID    90 tablet    TAKE 1 TABLET(75 MCG) BY MOUTH DAILY    Hypothyroidism due to acquired atrophy of thyroid       methylPREDNISolone 4 MG tablet    MEDROL DOSEPAK    21 tablet    Follow package instructions    Lateral epicondylitis of right elbow       Multi-vitamin Tabs tablet      Take 1 tablet by mouth daily        OMEPRAZOLE PO      Take 20 mg by mouth daily        order for DME     1 Package    Equipment being ordered: elbow brace    Lateral epicondylitis of right elbow       POTASSIUM CITRATE PO      Take 10 mEq by mouth daily        vitamin B complex with vitamin C Tabs tablet      Take 1 tablet by mouth daily        vitamin D 1000 units capsule     100 capsule    Take 1 capsule by mouth daily.        ZYRTEC 10 MG tablet   Generic drug:  cetirizine     90    ONE TABLET DAILY    Cough

## 2018-10-29 NOTE — PROGRESS NOTES
SUBJECTIVE:   Steffi Klein is a 63 year old female who presents to clinic today for the following health issues:      Joint Pain    Onset: 2 weeks ago    Description:   Location: Right elbow  Character: Achy, with burst of sharp    Intensity: 7/10    Progression of Symptoms: intermittent ache.     Accompanying Signs & Symptoms:  Other symptoms: radiation of pain to wrist and up to shoulder.    History:   Previous similar pain: no       Precipitating factors:   Trauma or overuse: YES- Hauling laundry    Alleviating factors:  Improved by: rest/inactivity, massage and Advil    Therapies Tried and outcome: Advil with no relief     Per patient pain worsened after lifting very heavy bags of laundry for her mother.     Problem list and histories reviewed & adjusted, as indicated.  Additional history: as documented    Patient Active Problem List   Diagnosis     Esophageal reflux     Anxiety     CARDIOVASCULAR SCREENING; LDL GOAL LESS THAN 160     Neuropathy     Hypothyroidism     Rosacea     Non morbid obesity due to excess calories     Onychomycosis     Macrocytosis     Hypotension, unspecified hypotension type     Bradycardia     Abnormal pulse oximetry     Past Surgical History:   Procedure Laterality Date     C NONSPECIFIC PROCEDURE      3 NSVDs     D & C       ESOPHAGOSCOPY, GASTROSCOPY, DUODENOSCOPY (EGD), COMBINED  10/24/2014     [facility]     ESOPHAGOSCOPY, GASTROSCOPY, DUODENOSCOPY (EGD), COMBINED N/A 10/24/2014    Procedure: COMBINED ESOPHAGOSCOPY, GASTROSCOPY, DUODENOSCOPY (EGD);  Surgeon: Black Alva MD;  Location: [facility]     OPEN REDUCTION INTERNAL FIXATION WRIST  12/3/2012    Procedure: OPEN REDUCTION INTERNAL FIXATION WRIST;  Left Wrist Open Reduction Internal Fixation        ORTHOPEDIC SURGERY      right ankle surgery     skin cancer excision       TONSILLECTOMY  as a child        Social History   Substance Use Topics     Smoking status: Former Smoker     Smokeless tobacco: Never Used       "Comment: quit 's     Alcohol use Yes      Comment: socially     Family History   Problem Relation Age of Onset     Cancer Father      lung and brain cancer,  at age of 69     Cancer Mother      lung cancer, surgically resected     Arthritis Mother      has had knee surgeries     Alzheimer Disease Mother      Cancer Paternal Aunt       from breast CA     Alzheimer Disease Maternal Grandfather            Reviewed and updated as needed this visit by clinical staff       Reviewed and updated as needed this visit by Provider         ROS:  Constitutional, msk, neuro  systems are negative, except as otherwise noted.    OBJECTIVE:     BP 99/67 (BP Location: Right arm, Patient Position: Chair, Cuff Size: Adult Regular)  Pulse 69  Temp 98  F (36.7  C) (Oral)  Resp 16  Ht 5' 2.75\" (1.594 m)  Wt 192 lb (87.1 kg)  LMP 2007  SpO2 96%  Breastfeeding? No  BMI 34.28 kg/m2  Body mass index is 34.28 kg/(m^2).  GENERAL: healthy, alert and no distress  MS: right UE- TTP lateral epicondyle, strength 5/5, pain with AROM, sensation intact   SKIN: no suspicious lesions or rashes    Diagnostic Test Results:  none     ASSESSMENT/PLAN:         1. Lateral epicondylitis of right elbow  - discussed steroid injection and PT. She prefers oral steroid instead. Also discussed bracing.   - methylPREDNISolone (MEDROL DOSEPAK) 4 MG tablet; Follow package instructions  Dispense: 21 tablet; Refill: 0  - [facility], HAND, AND CHIROPRACTIC REFERRAL; Future  - order for DME; Equipment being ordered: elbow brace  Dispense: 1 Package; Refill: 0    2. Need for prophylactic vaccination and inoculation against influenza  - FLU VACCINE, (RIV4) RECOMBINANT HA  , IM (FluBlok, egg free) [48786]- >18 YRS (FMG recommended  50-64 YRS)  - Vaccine Administration, Initial [78388]    See Patient Instructions    Sade Gallagher MD  [facility]    "

## 2018-10-29 NOTE — PATIENT INSTRUCTIONS
Understanding Lateral Epicondylitis    Tendons are strong bands of tissue that connect muscles to bones. Lateral epicondylitis affects the tendons that connect muscles in the forearm to the lateral epicondyle. This is the bony knob on the outer side of the elbow. The condition occurs if the extensor tendons of the wrist become red and swollen (irritated). This can cause pain in the elbow, forearm, and wrist. Because the condition is sometimes caused by playing tennis, it is also known as  tennis elbow.   How to say it  LA-tuhr-blade [id number]-LY-[id number]   What causes lateral epicondylitis?  The condition most often occurs because of overuse. This can be from any activity that repeatedly puts stress on the forearm extensor muscles or tendons and wrist. For instance, playing tennis, lifting weights, cutting meat, painting, and typing can all cause the condition. Wear and tear of the tendons from aging or an injury to the tendons can also cause the condition.  Symptoms of lateral epicondylitis  The most common symptom is pain. You may feel it on the outer side of the elbow and down the back of the forearm. It may be worse when moving or using the elbow, forearm, or wrist. You may also feel pain when gripping or lifting things.  Treatment for lateral epicondylitis  Treatments may include:    Resting the elbow, forearm, and wrist. You ll need to avoid movements that can make your symptoms worse. You also may need to avoid certain sports and types of work for a time. This helps relieve symptoms and prevent further damage to the tendons.    Changing the action that caused the problem. For instance, if the tendons were damaged from playing tennis, it may help to change your playing technique or use different equipment. This helps prevent further damage to the tendons.    Using cold packs. Putting an ice pack on the injured area can help reduce pain and swelling.    Taking pain medicines. Taking prescription or  over-the-counter pain medicines may help reduce pain and swelling.      Wearing a brace. This helps reduce strain on the muscles and tendons in the forearm, which may relieve symptoms. It is very important to wear the brace properly.    Doing exercises and physical therapy. These help improve strength and range of motion in the elbow, forearm, and wrist.    Getting shots of medicine into the injured area. These may help relieve symptoms for a time.    Having surgery. This may be an option if other treatments fail to relieve symptoms. In many cases, the surgeon removes the damaged tissue.  Possible complications of lateral epicondylitis  If the tendons involved don t heal properly, symptoms may return or get worse. To help prevent this, follow your treatment plan as directed.  When to call your healthcare provider  Call your healthcare provider right away if you have any of these:    Fever of 100.4 F (38 C) or higher, or as directed    Redness, swelling, or warmth in the elbow or forearm that gets worse    Symptoms that don t get better with treatment, or get worse    New symptoms   Date Last Reviewed: 3/10/2016    3890-1620 [employer]. [address] [place] [address], [place] [address]. All rights reserved. This information is not intended as a substitute for professional medical care. Always follow your healthcare professional's instructions.

## 2018-10-29 NOTE — PROGRESS NOTES

## 2018-10-30 PROBLEM — G47.33 OSA (OBSTRUCTIVE SLEEP APNEA): Status: ACTIVE | Noted: 2018-10-30

## 2018-10-31 LAB — SLPCOMP: NORMAL

## 2018-10-31 NOTE — PROCEDURES
" SLEEP STUDY INTERPRETATION  TITRATION STUDY      Patient: PEGGY PINO  YOB: 1955  Study Date: 10/27/2018  MRN: [id number]  Referring Provider: MD Newman Brian  Ordering Provider: Goltz, PA-C, Bennett    Indications for Polysomnography: The patient is a 63 y old Female who is 5' 3\" and weighs 187.0 lbs. Her BMI is 33.1, [place] sleepiness scale 17.0 and neck circumference is 37.0 cm. Relevant medical history includes anxiety, GERD, neuropathy, hypothyroidism, IBS, macrocytosis, RLS and obesity. A polysomnogram with PAP titration was performed to correct for JEROD and hypoventilation.    Polysomnogram Data: A full night polysomnogram recorded the standard physiologic parameters including EEG, EOG, EMG, ECG, nasal and oral airflow. Respiratory parameters of chest and abdominal movements were recorded with respiratory inductance plethysmography. Oxygen saturation was recorded by pulse oximetry. Hypopnea scoring rule used: 1B 4%.    Treatment PSG  Sleep Architecture: Normal sleep latency without sleep aid, normal arousal index, and normal sleep efficiency.  The total recording time of the polysomnogram was 524.1 minutes. The total sleep time was 492.0 minutes. Sleep latency was normal at 5.0 minutes without the use of a sleep aid. REM latency was 85.0 minutes. Arousal index was normal at 10.7 arousals per hour. Sleep efficiency was normal at 93.9%. Wake after sleep onset was 27.0 minutes. The patient spent 2.6% of total sleep time in Stage N1, 47.3% in Stage N2, 28.4% in Stage N3, and 21.7% in REM. Time in REM supine was 85.0 minutes.    Respiration: Good titration. Cluster of REM-related events around 2:10 AM were noted; technologist changed mask size but leak was fairly consistent and it is not clear why events ceased with later REM periods.    The patient was titrated at pressures ranging from CPAP 5 cmH2O up to CPAP 9 cmH2O. The final pressure achieved was CPAP 9 cmH2O with a residual AHI of 6.1 " events per hour. Time in REM supine on final pressure was 63.0 minutes.     This titration was considered:  - Good (residual AHI < 10 events per hour or 50% decrease if baseline AHI > 15 events per hour, and including REM supine sleep at final pressure).    Snoring - was reported as mild on earlier treatment pressures.    Respiratory rate and pattern - was normal.    Sustained Sleep Associated Hypoventilation - Transcutaneous carbon dioxide monitoring was used, however significant hypoventilation was not present with a maximum change from 40.0 to 49.7 mmHg and 0 minutes at or greater than 55 mmHg.    Sleep Associated Hypoxemia - (Greater than 5 minutes O2 sat at or below 88%) was present. Baseline oxygen saturation was 93.4%. Lowest oxygen saturation was 76.1%. Time spent less than or equal to 88% was 6.6 minutes. Time spent less than or equal to 89% was 8.4 minutes.    Movement Activity: Few PLMs without evidence of associated increase in arousals.    Periodic Limb Activity - There were 60 PLMs during the entire study. The PLM index was 7.3 movements per hour. The PLM Arousal Index was 0.9 per hour.    REM EMG Activity - Excessive muscle activity was not present.    Nocturnal Behavior - Abnormal sleep related behaviors were not noted.    Bruxism - None apparent.    Cardiac Summary: No significant arrhythmias noted.  The average pulse rate was 71.3 bpm. The minimum pulse rate was 51.0 bpm while the maximum pulse rate was 100.0 bpm. Arrhythmias were not noted.    Assessment:     Normal sleep latency without sleep aid, normal arousal index, and normal sleep efficiency.    Good titration. Cluster of REM-related events around 2:10 AM were noted; technologist changed mask size but leak was fairly consistent and it is not clear why events ceased with later REM periods.    Few PLMs without evidence of associated increase in arousals.    No significant arrhythmias noted.    Recommendations:    Treatment of JEROD with  Auto?titrating PAP therapy with a range of 9 cmH2O to 14 cmH2O. Recommend clinical follow up with sleep management team, including review of compliance measures.    Advice regarding the risks of drowsy driving.    Suggest optimizing sleep schedule and avoiding sleep deprivation.    Weight management (if BMI > 30).    Pharmacologic therapy should be used for management of restless legs syndrome only if present and clinically indicated and not based on the presence of periodic limb movements alone.    Diagnostic Codes:   Obstructive Sleep Apnea G47.33   _____________________________________   Electronically Signed By: Brett Soares MD 10/30/2018

## 2018-11-07 ENCOUNTER — OFFICE VISIT (OUTPATIENT)
Dept: SLEEP MEDICINE | Facility: CLINIC | Age: 63
End: 2018-11-07
Payer: COMMERCIAL

## 2018-11-07 VITALS
OXYGEN SATURATION: 96 % | BODY MASS INDEX: 33.13 KG/M2 | HEART RATE: 71 BPM | HEIGHT: 63 IN | WEIGHT: 187 LBS | DIASTOLIC BLOOD PRESSURE: 74 MMHG | RESPIRATION RATE: 16 BRPM | SYSTOLIC BLOOD PRESSURE: 108 MMHG

## 2018-11-07 DIAGNOSIS — G47.33 OSA (OBSTRUCTIVE SLEEP APNEA): Primary | ICD-10-CM

## 2018-11-07 PROCEDURE — 99214 OFFICE O/P EST MOD 30 MIN: CPT | Performed by: PHYSICIAN ASSISTANT

## 2018-11-07 NOTE — PATIENT INSTRUCTIONS
You will be provided with an auto-titrating CPAP with a pressure range of 9-14 cm with heated humidity to limit nasal congestion. Adjust the heat level on humidifier to find a setting that prevents dry nose but does not cause condensation in the hose or mask. Use distilled water in the humidifier.  The CPAP has a ramp function that starts the pressure lower than your prescribed pressure and gradually increases it over a number of minutes.  This may make it easier to fall asleep.    Try to use the CPAP every-night, all night (minimum of 4 hours). Many insurances require that we prove you are using the CPAP at least 4 hours on at least 70% of nights over a 30 day period. We have 90 days to meet those criteria.            Discussed weight management and the impact of weight gain on sleep apnea.  Let me know if you snore or feel the pressure is too high.    You can get new supplies (mask, hose and filter) for your CPAP every 3-6 months, covered by insurance. You do not need to get supplies that often, but they are available if you would like them.  You may exchange the mask once within the first month if you feel the initial mask does not fit well.  Contact your medical equipment provider for equipment issues.  Please let me know if you have any return of snoring, daytime sleepiness or poor sleep quality. We will want to make sure your CPAP is adequately treating your apnea.  There is a website called CPAP.com that has accessories that may make CPAP use easier. Please visit it at your convenience.  Our phone number is [phone].    Follow-up 2 month.  Bring your CPAP machine with you to the follow up appointment.    Your BMI is Body mass index is 33.39 kg/(m^2).  Weight management is a personal decision.  If you are interested in exploring weight loss strategies, the following discussion covers the approaches that may be successful. Body mass index (BMI) is one way to tell whether you are at a healthy weight,  overweight, or obese. It measures your weight in relation to your height.  A BMI of 18.5 to 24.9 is in the healthy range. A person with a BMI of 25 to 29.9 is considered overweight, and someone with a BMI of 30 or greater is considered obese. More than two-thirds of [place] adults are considered overweight or obese.  Being overweight or obese increases the risk for further weight gain. Excess weight may lead to heart disease and diabetes.  Creating and following plans for healthy eating and physical activity may help you improve your health.  Weight control is part of healthy lifestyle and includes exercise, emotional health, and healthy eating habits. Careful eating habits lifelong are the mainstay of weight control. Though there are significant health benefits from weight loss, long-term weight loss with diet alone may be very difficult to achieve- studies show long-term success with dietary management in less than 10% of people. Attaining a healthy weight may be especially difficult to achieve in those with severe obesity. In some cases, medications, devices and surgical management might be considered.  What can you do?  If you are overweight or obese and are interested in methods for weight loss, you should discuss this with your provider.     Consider reducing daily calorie intake by 500 calories.     Keep a food journal.     Avoiding skipping meals, consider cutting portions instead.    Diet combined with exercise helps maintain muscle while optimizing fat loss. Strength training is particularly important for building and maintaining muscle mass. Exercise helps reduce stress, increase energy, and improves fitness. Increasing exercise without diet control, however, may not burn enough calories to loose weight.       Start walking three days a week 10-20 minutes at a time    Work towards walking thirty minutes five days a week     Eventually, increase the speed of your walking for 1-2 minutes at time    In  addition, we recommend that you review healthy lifestyles and methods for weight loss available through the National Institutes of Health patient information sites:  http://win.niddk.nih.gov/publications/index.htm    And look into health and wellness programs that may be available through your health insurance provider, employer, local community center, or robert club.    Weight management plan: Patient was referred to their PCP to discuss a diet and exercise plan.

## 2018-11-07 NOTE — MR AVS SNAPSHOT
After Visit Summary   11/7/2018    Steffi Klein    MRN: [id number]           Patient Information     Date Of Birth          1955        Visit Information        Provider Department      11/7/2018 4:00 PM Goltz, Bennett Ezra, PA-C [facility]        Today's Diagnoses     JEROD (obstructive sleep apnea)    -  1      Care Instructions    You will be provided with an auto-titrating CPAP with a pressure range of 9-14 cm with heated humidity to limit nasal congestion. Adjust the heat level on humidifier to find a setting that prevents dry nose but does not cause condensation in the hose or mask. Use distilled water in the humidifier.  The CPAP has a ramp function that starts the pressure lower than your prescribed pressure and gradually increases it over a number of minutes.  This may make it easier to fall asleep.    Try to use the CPAP every-night, all night (minimum of 4 hours). Many insurances require that we prove you are using the CPAP at least 4 hours on at least 70% of nights over a 30 day period. We have 90 days to meet those criteria.            Discussed weight management and the impact of weight gain on sleep apnea.  Let me know if you snore or feel the pressure is too high.    You can get new supplies (mask, hose and filter) for your CPAP every 3-6 months, covered by insurance. You do not need to get supplies that often, but they are available if you would like them.  You may exchange the mask once within the first month if you feel the initial mask does not fit well.  Contact your medical equipment provider for equipment issues.  Please let me know if you have any return of snoring, daytime sleepiness or poor sleep quality. We will want to make sure your CPAP is adequately treating your apnea.  There is a website called CPAP.com that has accessories that may make CPAP use easier. Please visit it at your convenience.  Our phone number is [phone].    Follow-up 2  month.  Bring your CPAP machine with you to the follow up appointment.    Your BMI is Body mass index is 33.39 kg/(m^2).  Weight management is a personal decision.  If you are interested in exploring weight loss strategies, the following discussion covers the approaches that may be successful. Body mass index (BMI) is one way to tell whether you are at a healthy weight, overweight, or obese. It measures your weight in relation to your height.  A BMI of 18.5 to 24.9 is in the healthy range. A person with a BMI of 25 to 29.9 is considered overweight, and someone with a BMI of 30 or greater is considered obese. More than two-thirds of American adults are considered overweight or obese.  Being overweight or obese increases the risk for further weight gain. Excess weight may lead to heart disease and diabetes.  Creating and following plans for healthy eating and physical activity may help you improve your health.  Weight control is part of healthy lifestyle and includes exercise, emotional health, and healthy eating habits. Careful eating habits lifelong are the mainstay of weight control. Though there are significant health benefits from weight loss, long-term weight loss with diet alone may be very difficult to achieve- studies show long-term success with dietary management in less than 10% of people. Attaining a healthy weight may be especially difficult to achieve in those with severe obesity. In some cases, medications, devices and surgical management might be considered.  What can you do?  If you are overweight or obese and are interested in methods for weight loss, you should discuss this with your provider.     Consider reducing daily calorie intake by 500 calories.     Keep a food journal.     Avoiding skipping meals, consider cutting portions instead.    Diet combined with exercise helps maintain muscle while optimizing fat loss. Strength training is particularly important for building and maintaining muscle  mass. Exercise helps reduce stress, increase energy, and improves fitness. Increasing exercise without diet control, however, may not burn enough calories to loose weight.       Start walking three days a week 10-20 minutes at a time    Work towards walking thirty minutes five days a week     Eventually, increase the speed of your walking for 1-2 minutes at time    In addition, we recommend that you review healthy lifestyles and methods for weight loss available through the National Institutes of Health patient information sites:  http://win.niddk.nih.gov/publications/index.htm    And look into health and wellness programs that may be available through your health insurance provider, employer, local community center, or robert club.    Weight management plan: Patient was referred to their PCP to discuss a diet and exercise plan.            Follow-ups after your visit        Follow-up notes from your care team     Return in about 2 months (around 1/7/2019) for CPAP compliance recheck.      Who to contact     If you have questions or need follow up information about today's clinic visit or your schedule please contact [facility] directly at [phone].  Normal or non-critical lab and imaging results will be communicated to you by [employer]ellon[employer], letter or phone within 4 business days after the clinic has received the results. If you do not hear from us within 7 days, please contact the clinic through [employer] or phone. If you have a critical or abnormal lab result, we will notify you by phone as soon as possible.  Submit refill requests through [employer] or call your pharmacy and they will forward the refill request to us. Please allow 3 business days for your refill to be completed.          Additional Information About Your Visit        [employer]ellonhart Information     [employer] gives you secure access to your electronic health record. If you see a primary care provider, you can also send messages to your care team  "and make appointments. If you have questions, please call your primary care clinic.  If you do not have a primary care provider, please call [phone] and they will assist you.        Care EveryWhere ID     This is your Care EveryWhere ID. This could be used by other organizations to access your [place] medical records  [id number]        Your Vitals Were     Pulse Respirations Height Last Period Pulse Oximetry BMI (Body Mass Index)    71 16 1.594 m (5' 2.75\") 08/13/2007 96% 33.39 kg/m2       Blood Pressure from Last 3 Encounters:   11/07/18 108/74   10/29/18 99/67   09/25/18 116/72    Weight from Last 3 Encounters:   11/07/18 84.8 kg (187 lb)   10/29/18 87.1 kg (192 lb)   09/25/18 84.8 kg (187 lb)              We Performed the Following     Comprehensive DME        Primary Care Provider Office Phone # Fax #    Raul Newman [phone] [address] [facility] [address]        Equal Access to Services     [facility]: Hadii aad ku hadasho Soomaali, waaxda luqadaha, qaybta kaalmada adeegwendy, akua alvarado . So [facility] [phone].    ATENCIÓN: Si habla español, tiene a gonzalez disposición servicios gratuitos de asistencia lingüística. [facility] [phone].    We comply with applicable federal civil rights laws and [place] laws. We do not discriminate on the basis of race, color, national origin, age, disability, sex, sexual orientation, or gender identity.            Thank you!     Thank you for choosing [facility]  for your care. Our goal is always to provide you with excellent care. Hearing back from our patients is one way we can continue to improve our services. Please take a few minutes to complete the written survey that you may receive in the mail after your visit with us. Thank you!             Your Updated Medication List - Protect others around you: Learn how to safely use, store and throw away your medicines at " www.disposemymeds.org.          This list is accurate as of 11/7/18  4:30 PM.  Always use your most recent med list.                   Brand Name Dispense Instructions for use Diagnosis    SANJANA Mujica      Externally apply  topically.        doxycycline monohydrate 50 MG capsule     90 capsule    TAKE 1 TO 2 CAPSULES BY MOUTH DAILY AS NEEDED    Rosacea       gabapentin 300 MG capsule    NEURONTIN    270 capsule    TAKE 1 CAPSULE(300 MG) BY MOUTH THREE TIMES DAILY    Neuropathy       levothyroxine 75 MCG tablet    SYNTHROID/LEVOTHROID    90 tablet    TAKE 1 TABLET(75 MCG) BY MOUTH DAILY    Hypothyroidism due to acquired atrophy of thyroid       OMEPRAZOLE PO      Take 20 mg by mouth daily        order for DME     1 Package    Equipment being ordered: elbow brace    Lateral epicondylitis of right elbow       POTASSIUM CITRATE PO      Take 10 mEq by mouth daily        vitamin D 1000 units capsule     100 capsule    Take 1 capsule by mouth daily.        ZYRTEC 10 MG tablet   Generic drug:  cetirizine     90    ONE TABLET DAILY    Cough

## 2018-11-07 NOTE — PROGRESS NOTES
Sleep Study Follow-Up Visit:    Date on this visit: 11/7/2018    Steffi Klein comes in today for follow-up of her CPAP titration sleep study done on 10/27/2018 at the [facility] for excessive sleepiness for at least 6 months. She wonders if she has had some degree of sleepiness for most of her life though. Her medical history is significant for GERD, neuropathy, hypothyroidism, IBS, macrocytosis.     Diagnostic PSG (9/14/2018): AHI was 20.5/hr (10% centrals), with desaturations down to 79%. S/He spent 8.1 minutes below 90% SpO2 and 5.4 minutes below 89%. RDI 27.5/hr.  REM RDI 56.5/hr.  Supine RDI N/A.  Periodic Limb Movement Index 0/hour.   Her CO2 ranged from 44 to 55 mmHg, consistent with mild hypoventilation.    Titration PSG:  Sleep latency 5 minutes without Ambien.  REM achieved.   REM latency 85 minutes.  Sleep efficiency 93.9%. Total sleep time 492 minutes.    Sleep architecture:  Stage 1, 2.6% (5%), stage 2, 47.3% (45-55%), stage 3, 28.4% (15-20%), stage REM, 21.7% (20-25%).  Periodic Limb Movement Index 7.3/hour, 0.9/hr were associated with arousals.  Her CO2 ranged from 40 to 49 mmHg, not suggestive of hypoventilation.     CPAP titration:  CPAP was titrated to 9 cm. In her first REM period at this pressure, she had some mask leak and residual obstructive apneas. The mask was changed and she had 2 subsequent REM periods without significant apnea (although the mask leak was fairly consistent despite the mask change). Supine REM was noted at this pressure. These findings were reviewed with the patient.  She said she felt weird the next morning. She can't explain exactly how. She had more energy the next day and was more productive.   She inquires about the dental appliance because they have a new puppy that sleeps in bed with them.    Past medical/surgical history, family history, social history, medications and allergies were reviewed.      Problem List:  Patient Active Problem List     Diagnosis Date Noted     JEROD (obstructive sleep apnea) 10/30/2018     Priority: Medium     10/27/2018 [place] Titration Sleep Study (187.0 lbs) - Treatment was titrated to a pressure of CPAP 9 with an AHI of 6.1. Time Spent in REM supine at this pressure was 63.0.       Hypotension, unspecified hypotension type 07/09/2018     Priority: Medium     Bradycardia 07/09/2018     Priority: Medium     Abnormal pulse oximetry 07/09/2018     Priority: Medium     Macrocytosis 04/22/2018     Priority: Medium     Onychomycosis 12/14/2017     Priority: Medium     Non morbid obesity due to excess calories 07/18/2017     Priority: Medium     Rosacea 07/13/2015     Priority: Medium     Hypothyroidism 07/02/2014     Priority: Medium     Neuropathy 06/30/2014     Priority: Medium     R foot post op       CARDIOVASCULAR SCREENING; LDL GOAL LESS THAN 160 10/31/2010     Priority: Medium     2.4% 10 yr risk 2015       Anxiety 12/28/2009     Priority: Medium     Esophageal reflux 07/02/2006     Priority: Medium     Famotidine (pepcid) 20 or ranitidine(zantac) 150 failure          Impression/Plan:    (G47.33) JEROD (obstructive sleep apnea)  (primary encounter diagnosis)  Comment: Steffi responded fairly well to CPAP 9 cm. Her initial REM supine period on CPAP 9 cm showed a lot of residual apnea. After a mask change, her apnea was resolved on the same pressure. She seems hesitant to try CPAP, but she will because I explained that the dental appliance is less reliable with her degree of apnea/desaturations.  Plan: Comprehensive DME        I am prescribing auto CPAP 5-15 cm, heated humidifier and compliance meter. The patient was informed of the mask exchange policy and the compliance goals. They were also informed that they would be followed by the sleep therapy management team during the first month of CPAP use.       She will follow up with me in about 2 month(s).     Twenty-five minutes spent with patient, all of which were spent  face-to-face counseling, consulting, coordinating plan of care.      Bennett Goltz, PA-C    CC: Raul Newman MD

## 2018-11-07 NOTE — NURSING NOTE
"Chief Complaint   Patient presents with     Study Results     PSG f/u       Initial /74  Pulse 71  Resp 16  Ht 1.594 m (5' 2.75\")  Wt 84.8 kg (187 lb)  LMP 08/13/2007  SpO2 96%  BMI 33.39 kg/m2 Estimated body mass index is 33.39 kg/(m^2) as calculated from the following:    Height as of this encounter: 1.594 m (5' 2.75\").    Weight as of this encounter: 84.8 kg (187 lb).    Medication Reconciliation: complete     ESS 15  Kym Thomas      "

## 2018-11-12 ENCOUNTER — TELEPHONE (OUTPATIENT)
Dept: FAMILY MEDICINE | Facility: CLINIC | Age: 63
End: 2018-11-12

## 2018-11-12 DIAGNOSIS — Z12.11 SPECIAL SCREENING FOR MALIGNANT NEOPLASMS, COLON: Primary | ICD-10-CM

## 2018-11-12 NOTE — TELEPHONE ENCOUNTER
Panel Management Review      Patient has the following on her problem list:     Hypertension   Last three blood pressure readings:  BP Readings from Last 3 Encounters:   11/07/18 108/74   10/29/18 99/67   09/25/18 116/72     Blood pressure: Passed    HTN Guidelines:  Age 18-59 BP range:  Less than 140/90  Age 60-85 with Diabetes:  Less than 140/90  Age 60-85 without Diabetes:  less than 150/90      Composite cancer screening  Chart review shows that this patient is due/due soon for the following Fecal Colorectal (FIT)  Summary:    Patient is due/failing the following:   FIT    Action needed:   Patient needs referral/order: FIT    Type of outreach:    routed to panel pool for outreach    Questions for provider review:    None                                                                                                                                    Vesna Kothari       Chart routed to Care Team .

## 2018-11-15 NOTE — TELEPHONE ENCOUNTER
Type of outreach:  Phone, spoke to patient.  patient will  FIT test at lab, order placed.    Aminata Flores MA on 11/15/2018 at 10:36 AM

## 2018-11-21 ENCOUNTER — DOCUMENTATION ONLY (OUTPATIENT)
Dept: SLEEP MEDICINE | Facility: CLINIC | Age: 63
End: 2018-11-21

## 2018-11-21 DIAGNOSIS — G47.33 OSA (OBSTRUCTIVE SLEEP APNEA): ICD-10-CM

## 2018-11-21 NOTE — PROGRESS NOTES
Patient was offered choice of vendor and chose [facility].  Patient Steffi Klein was set up at [facility] on November 21, 2018. Patient received a [employer] DreamStation Auto. Pressures were set at 9-14 cm H2O.   Patient s ramp is 5 cm H2O for 30 min and FLEX/EPR is C Flex, 2.  Patient received a Resmed Mask name: AIRFIT P10  Pillow mask size For Her, Small, heated tubing and heated humidifier.  Patient is enrolled in the STM Program and does not need to meet compliance. Patient has a follow up on 12/15/2018 with Bennett Goltz, PA.    Catie Candelario

## 2018-11-26 ENCOUNTER — DOCUMENTATION ONLY (OUTPATIENT)
Dept: SLEEP MEDICINE | Facility: CLINIC | Age: 63
End: 2018-11-26

## 2018-11-26 DIAGNOSIS — G47.33 OSA (OBSTRUCTIVE SLEEP APNEA): ICD-10-CM

## 2018-11-26 NOTE — PROGRESS NOTES
3 DAY STM VISIT    Diagnostic AHI: 20.5 PSG    Patient contacted for 3 day STM visit  Subjective measures:  Pt states things are going well and has no issues or complaints.  Pt is benefiting from therapy.     Device type: Auto-CPAP  PAP settings from order::  CPAP min 9 cm  H20       CPAP max 14 cm  H20  Mask type:    Nasal Mask     Device settings from machine      Min CPAP 9            Max CPAP 14      Assessment: Nightly usage over four hours.  Action plan: Pt to have f/u 14 day STM visit.  Patient has a follow up visit scheduled:   yes within 31-90 days of set up.

## 2018-12-06 ENCOUNTER — DOCUMENTATION ONLY (OUTPATIENT)
Dept: SLEEP MEDICINE | Facility: CLINIC | Age: 63
End: 2018-12-06
Payer: COMMERCIAL

## 2018-12-06 NOTE — PROGRESS NOTES
14 DAY STM VISIT    Diagnostic AHI: 20.5  PSG    Message left for patient to return call.     Assessment: Pt meeting objective benchmarks.     Action plan: waiting for patient to return call.    Device type: Auto-CPAP  PAP settings: CPAP min 9 cm  H20     CPAP max 14 cm  H20     90th % [id number] cm  H20    Mask type:   Nasal Mask     Objective measures: 14 day rolling measures         Compliance  100 %           AHI 3.82   last  upload      Average number of minutes 474     Average hours of usage 7.9              Objective measure goal  Compliance   Goal >70%  Leak   Goal < 10%  AHI  Goal < 5  Usage  Goal >240

## 2018-12-10 ENCOUNTER — OFFICE VISIT (OUTPATIENT)
Dept: FAMILY MEDICINE | Facility: CLINIC | Age: 63
End: 2018-12-10
Payer: COMMERCIAL

## 2018-12-10 VITALS
OXYGEN SATURATION: 97 % | DIASTOLIC BLOOD PRESSURE: 73 MMHG | RESPIRATION RATE: 12 BRPM | TEMPERATURE: 97.8 F | HEART RATE: 65 BPM | SYSTOLIC BLOOD PRESSURE: 111 MMHG | BODY MASS INDEX: 34.53 KG/M2 | WEIGHT: 193.4 LBS

## 2018-12-10 DIAGNOSIS — M77.11 LATERAL EPICONDYLITIS OF RIGHT ELBOW: Primary | ICD-10-CM

## 2018-12-10 PROCEDURE — 99213 OFFICE O/P EST LOW 20 MIN: CPT | Performed by: FAMILY MEDICINE

## 2018-12-10 RX ORDER — METHYLPREDNISOLONE 4 MG
TABLET, DOSE PACK ORAL
Qty: 21 TABLET | Refills: 0 | Status: SHIPPED | OUTPATIENT
Start: 2018-12-10 | End: 2018-12-17

## 2018-12-10 NOTE — PROGRESS NOTES
SUBJECTIVE:   Steffi Klein is a 63 year old female who presents to clinic today for the following health issues:      Patient is here to follow up on the 10/29/18 visit for her right elbow.  She states it got better for awhile but after lifting too many boxes to get ready for [place] it is now bothering her again.    States did not make it to PT due to time. A friend is having the same issue who is showing her some of the PT exercises.         Problem list and histories reviewed & adjusted, as indicated.  Additional history: as documented    Patient Active Problem List   Diagnosis     Esophageal reflux     Anxiety     CARDIOVASCULAR SCREENING; LDL GOAL LESS THAN 160     Neuropathy     Hypothyroidism     Rosacea     Non morbid obesity due to excess calories     Onychomycosis     Macrocytosis     Hypotension, unspecified hypotension type     Bradycardia     Abnormal pulse oximetry     JEROD (obstructive sleep apnea)     Past Surgical History:   Procedure Laterality Date     C NONSPECIFIC PROCEDURE      3 NSVDs     D & C       ESOPHAGOSCOPY, GASTROSCOPY, DUODENOSCOPY (EGD), COMBINED  10/24/2014     [facility]     ESOPHAGOSCOPY, GASTROSCOPY, DUODENOSCOPY (EGD), COMBINED N/A 10/24/2014    Procedure: COMBINED ESOPHAGOSCOPY, GASTROSCOPY, DUODENOSCOPY (EGD);  Surgeon: Black Alva MD;  Location: [facility]     HC REMOVAL DEEP IMPLANT Left 07/11/2018    [facility] distal [facility] [place]     OPEN REDUCTION INTERNAL FIXATION WRIST  12/3/2012    Procedure: OPEN REDUCTION INTERNAL FIXATION WRIST;  Left Wrist Open Reduction Internal Fixation        ORTHOPEDIC SURGERY      right ankle surgery     skin cancer excision       TONSILLECTOMY  as a child        Social History     Tobacco Use     Smoking status: Former Smoker     Smokeless tobacco: Never Used     Tobacco comment: quit 1970's   Substance Use Topics     Alcohol use: Yes     Comment: socially     Family History    Problem Relation Age of Onset     Cancer Father         lung and brain cancer,  at age of 69     Cancer Mother         lung cancer, surgically resected     Arthritis Mother         has had knee surgeries     Alzheimer Disease Mother      Cancer Paternal Aunt          from breast CA     Alzheimer Disease Maternal Grandfather            Reviewed and updated as needed this visit by clinical staff       Reviewed and updated as needed this visit by Provider         ROS:  Constitutional, msk  systems are negative, except as otherwise noted.    OBJECTIVE:     /73 (BP Location: Right arm, Patient Position: Chair, Cuff Size: Adult Large)   Pulse 65   Temp 97.8  F (36.6  C) (Oral)   Resp 12   Wt 87.7 kg (193 lb 6.4 oz)   LMP 2007   SpO2 97%   BMI 34.53 kg/m    Body mass index is 34.53 kg/m .  GENERAL: healthy, alert and no distress  MS: right elbow- TTP lateral epicondyle     Diagnostic Test Results:  none     ASSESSMENT/PLAN:         1. Lateral epicondylitis of right elbow  - not interested in PT or steroid injection at this time. Supportive care reviewed   - methylPREDNISolone (MEDROL DOSEPAK) 4 MG tablet therapy pack; Follow package instructions  Dispense: 21 tablet; Refill: 0  - diclofenac (VOLTAREN) 1 % topical gel; Place onto the skin 4 times daily  Dispense: 100 g; Refill: 0    See Patient Instructions    Sade Gallagher MD  [facility]

## 2018-12-10 NOTE — PATIENT INSTRUCTIONS
Patient Education     Understanding Lateral Epicondylitis    Tendons are strong bands of tissue that connect muscles to bones. Lateral epicondylitis affects the tendons that connect muscles in the forearm to the lateral epicondyle. This is the bony knob on the outer side of the elbow. The condition occurs if the extensor tendons of the wrist become red and swollen (irritated). This can cause pain in the elbow, forearm, and wrist. Because the condition is sometimes caused by playing tennis, it is also known as  tennis elbow.   How to say it  LA-tuhr-blade ll-[id number]-WIJ-duh-LY-tis   What causes lateral epicondylitis?  The condition most often occurs because of overuse. This can be from any activity that repeatedly puts stress on the forearm extensor muscles or tendons and wrist. For instance, playing tennis, lifting weights, cutting meat, painting, and typing can all cause the condition. Wear and tear of the tendons from aging or an injury to the tendons can also cause the condition.  Symptoms of lateral epicondylitis  The most common symptom is pain. You may feel it on the outer side of the elbow and down the back of the forearm. It may be worse when moving or using the elbow, forearm, or wrist. You may also feel pain when gripping or lifting things.  Treatment for lateral epicondylitis  Treatments may include:    Resting the elbow, forearm, and wrist. You ll need to avoid movements that can make your symptoms worse. You also may need to avoid certain sports and types of work for a time. This helps relieve symptoms and prevent further damage to the tendons.    Changing the action that caused the problem. For instance, if the tendons were damaged from playing tennis, it may help to change your playing technique or use different equipment. This helps prevent further damage to the tendons.    Using cold packs. Putting an ice pack on the injured area can help reduce pain and swelling.    Taking pain medicines. Taking  prescription or over-the-counter pain medicines may help reduce pain and swelling.      Wearing a brace. This helps reduce strain on the muscles and tendons in the forearm, which may relieve symptoms. It is very important to wear the brace properly.    Doing exercises and physical therapy. These help improve strength and range of motion in the elbow, forearm, and wrist.    Getting shots of medicine into the injured area. These may help relieve symptoms for a time.    Having surgery. This may be an option if other treatments fail to relieve symptoms. In many cases, the surgeon removes the damaged tissue.  Possible complications of lateral epicondylitis  If the tendons involved don t heal properly, symptoms may return or get worse. To help prevent this, follow your treatment plan as directed.  When to call your healthcare provider  Call your healthcare provider right away if you have any of these:    Fever of 100.4 F (38 C) or higher, or as directed    Redness, swelling, or warmth in the elbow or forearm that gets worse    Symptoms that don t get better with treatment, or get worse    New symptoms   Date Last Reviewed: 3/10/2016    8795-5630 [employer]. [address] [place] [address], [place] [address]. All rights reserved. This information is not intended as a substitute for professional medical care. Always follow your healthcare professional's instructions.

## 2018-12-26 ENCOUNTER — DOCUMENTATION ONLY (OUTPATIENT)
Dept: SLEEP MEDICINE | Facility: CLINIC | Age: 63
End: 2018-12-26

## 2018-12-26 DIAGNOSIS — G47.33 OSA (OBSTRUCTIVE SLEEP APNEA): ICD-10-CM

## 2018-12-26 NOTE — PROGRESS NOTES
30 DAY STM VISIT    Diagnostic AHI:   PSG AHI: 20.5       Subjective measures:   Pt states things are going well and has no issues or complaints.  Pt is benefiting from therapy.    Device type:  Auto-CPAP     Mask type:  Nasal Mask      Assessment: Pt meeting objective benchmarks.  Patient meeting subjective benchmarks.       Action plan: pt to have 6 month STM visit  Patient has scheduled a follow up visit with Bennett Goltz, PA on 1/15/19.           Objective measure goal  Compliance   Goal >70%  Leak   Goal < 24 lpm or 10% of the night in large leak   AHI  Goal < 5  Usage  Goal >240

## 2018-12-28 DIAGNOSIS — Z12.11 SPECIAL SCREENING FOR MALIGNANT NEOPLASMS, COLON: ICD-10-CM

## 2018-12-28 LAB — HEMOCCULT STL QL IA: NEGATIVE

## 2018-12-28 PROCEDURE — 82274 ASSAY TEST FOR BLOOD FECAL: CPT | Performed by: FAMILY MEDICINE

## 2019-01-15 ENCOUNTER — OFFICE VISIT (OUTPATIENT)
Dept: SLEEP MEDICINE | Facility: CLINIC | Age: 64
End: 2019-01-15
Payer: COMMERCIAL

## 2019-01-15 VITALS
HEART RATE: 62 BPM | SYSTOLIC BLOOD PRESSURE: 101 MMHG | HEIGHT: 63 IN | DIASTOLIC BLOOD PRESSURE: 68 MMHG | BODY MASS INDEX: 35.26 KG/M2 | OXYGEN SATURATION: 95 % | WEIGHT: 199 LBS | RESPIRATION RATE: 16 BRPM

## 2019-01-15 DIAGNOSIS — G47.33 OSA (OBSTRUCTIVE SLEEP APNEA): Primary | ICD-10-CM

## 2019-01-15 PROCEDURE — 99214 OFFICE O/P EST MOD 30 MIN: CPT | Performed by: PHYSICIAN ASSISTANT

## 2019-01-15 ASSESSMENT — MIFFLIN-ST. JEOR: SCORE: 1432.82

## 2019-01-15 NOTE — PROGRESS NOTES
Sleep Study Follow-Up Visit:    Date on this visit: 1/15/2019    Steffi Klein comes in today for follow-up of her CPAP use for moderate JEROD and mild hypoxemia. She was initially seen at the [facility] for excessive sleepiness for at least 6 months. She wonders if she has had some degree of sleepiness for most of her life though. Her medical history is significant for GERD, neuropathy, hypothyroidism, IBS, macrocytosis.    Diagnostic PSG (9/14/2018): AHI was 20.5/hr (10% centrals), with desaturations down to 79%. S/He spent 8.1 minutes below 90% SpO2 and 5.4 minutes below 89%. RDI 27.5/hr.  REM RDI 56.5/hr.  Supine RDI N/A.  Periodic Limb Movement Index 0/hour.   Her CO2 ranged from 44 to 55 mmHg, consistent with mild hypoventilation.  Titration PSG showed CPAP of 9 cm was largely effective.    She is on auto CPAP 9-14 cm. She is using an AirFit p10 nasal pillows mask. It fits well. She does feel the CPAP is helping her. She has drastically better energy. She sometimes wakes noticing she is clenching her teeth. She has had dry mouth on a couple of occasions. The download shows 1 night in the last week where she may have had some mouth leak. The pressure had increased at that time.  It is harder to sleep on her stomach. She gets some mask leak then. It is not a big problem. She is comfortable with the pressures. She does use the ramp when she first puts it on.     The compliance data shows that s/he has used the CPAP for 30/30 nights, 100% of nights for >4 hours.  The 90th% pressure is 11.5 cm.  The average time in large leak is 0 sec.  The average nightly usage is 7:51.  The average AHI is 3.3/hr.    Past medical/surgical history, family history, social history, medications and allergies were reviewed.      Problem List:  Patient Active Problem List    Diagnosis Date Noted     JEROD (obstructive sleep apnea) 10/30/2018     Priority: Medium     10/27/2018 [place] Titration Sleep Study (187.0 lbs)  - Treatment was titrated to a pressure of CPAP 9 with an AHI of 6.1. Time Spent in REM supine at this pressure was 63.0.       Hypotension, unspecified hypotension type 07/09/2018     Priority: Medium     Bradycardia 07/09/2018     Priority: Medium     Abnormal pulse oximetry 07/09/2018     Priority: Medium     Macrocytosis 04/22/2018     Priority: Medium     Onychomycosis 12/14/2017     Priority: Medium     Non morbid obesity due to excess calories 07/18/2017     Priority: Medium     Rosacea 07/13/2015     Priority: Medium     Hypothyroidism 07/02/2014     Priority: Medium     Neuropathy 06/30/2014     Priority: Medium     R foot post op       CARDIOVASCULAR SCREENING; LDL GOAL LESS THAN 160 10/31/2010     Priority: Medium     2.4% 10 yr risk 2015       Anxiety 12/28/2009     Priority: Medium     Esophageal reflux 07/02/2006     Priority: Medium     Famotidine (pepcid) 20 or ranitidine(zantac) 150 failure          Impression/Plan:    (G47.33) JEROD (obstructive sleep apnea)  (primary encounter diagnosis)  Comment: She feels a drastic improvement in her energy. She may have some clenching now and infrequent mouth leak.   Plan: We talked about considering lowering the starting pressure to see if it would help reduce mouth leak. She did not want to make changes. She will ask her dentist about treating bruxism at an upcoming visit. We reviewed recommendations for cleaning and replacing supplies.      She will follow up with me in about 1 year(s).     Twenty-five minutes spent with patient, all of which were spent face-to-face counseling, consulting, coordinating plan of care.      Bennett Goltz, PA-C    CC: Raul Newman MD

## 2019-01-15 NOTE — PATIENT INSTRUCTIONS
Your BMI is Body mass index is 35.53 kg/m .  Weight management is a personal decision.  If you are interested in exploring weight loss strategies, the following discussion covers the approaches that may be successful. Body mass index (BMI) is one way to tell whether you are at a healthy weight, overweight, or obese. It measures your weight in relation to your height.  A BMI of 18.5 to 24.9 is in the healthy range. A person with a BMI of 25 to 29.9 is considered overweight, and someone with a BMI of 30 or greater is considered obese. More than two-thirds of American adults are considered overweight or obese.  Being overweight or obese increases the risk for further weight gain. Excess weight may lead to heart disease and diabetes.  Creating and following plans for healthy eating and physical activity may help you improve your health.  Weight control is part of healthy lifestyle and includes exercise, emotional health, and healthy eating habits. Careful eating habits lifelong are the mainstay of weight control. Though there are significant health benefits from weight loss, long-term weight loss with diet alone may be very difficult to achieve- studies show long-term success with dietary management in less than 10% of people. Attaining a healthy weight may be especially difficult to achieve in those with severe obesity. In some cases, medications, devices and surgical management might be considered.  What can you do?  If you are overweight or obese and are interested in methods for weight loss, you should discuss this with your provider.     Consider reducing daily calorie intake by 500 calories.     Keep a food journal.     Avoiding skipping meals, consider cutting portions instead.    Diet combined with exercise helps maintain muscle while optimizing fat loss. Strength training is particularly important for building and maintaining muscle mass. Exercise helps reduce stress, increase energy, and improves fitness.  Increasing exercise without diet control, however, may not burn enough calories to loose weight.       Start walking three days a week 10-20 minutes at a time    Work towards walking thirty minutes five days a week     Eventually, increase the speed of your walking for 1-2 minutes at time    In addition, we recommend that you review healthy lifestyles and methods for weight loss available through the National Institutes of Health patient information sites:  http://win.niddk.nih.gov/publications/index.htm    And look into health and wellness programs that may be available through your health insurance provider, employer, local community center, or robert club.    Weight management plan: Patient was referred to their PCP to discuss a diet and exercise plan.

## 2019-01-15 NOTE — NURSING NOTE
"Chief Complaint   Patient presents with     CPAP Follow Up       Initial /68   Pulse 62   Resp 16   Ht 0.628 m (2' 0.71\")   Wt 90.3 kg (199 lb)   LMP 08/13/2007   SpO2 95%   .24 kg/m   Estimated body mass index is 229.24 kg/m  as calculated from the following:    Height as of this encounter: 0.628 m (2' 0.71\").    Weight as of this encounter: 90.3 kg (199 lb).    Medication Reconciliation: complete     ESS 6  Kym Thomas      "

## 2019-05-21 ENCOUNTER — DOCUMENTATION ONLY (OUTPATIENT)
Dept: SLEEP MEDICINE | Facility: CLINIC | Age: 64
End: 2019-05-21

## 2019-05-21 DIAGNOSIS — G47.33 OSA (OBSTRUCTIVE SLEEP APNEA): ICD-10-CM

## 2019-05-21 NOTE — PROGRESS NOTES
6 Month [facility] visit    Diagnostic AHI: 20.5    PSG    Data only recheck     Assessment: Pt meeting objective benchmarks.     Action plan:   pt to follow up per provider request    Device type: Auto-CPAP  PAP settings: CPAP min 9 cm  H20     CPAP max 14 cm  H20     90th % vdedecnd86.4 cm  H20    Objective measures: 14 day rolling measures         Compliance  92 %     % of night spent in large leak  0 % last  upload      AHI 3.3   last  upload      Average number of minutes 426           Objective measure goal  Compliance   Goal >70%  Leak   Goal < 10%  AHI  Goal < 5  Usage  Goal >240

## 2019-06-03 ENCOUNTER — DOCUMENTATION ONLY (OUTPATIENT)
Dept: SLEEP MEDICINE | Facility: CLINIC | Age: 64
End: 2019-06-03

## 2019-06-03 NOTE — PROGRESS NOTES
Patient came to [place] for mask fitting appointment on Debora 3, 2019. Patient requested to switch masks because oral breathing. Patient tried on the following masks: SEE LIST Patient selected a Resmed Mask name: AIRFIT P30I Pillow mask size Small      MET WITH PT IN [place] [facility]OW[facility].   SHE HAS BEEN EXPERIENCING DRY MOUTH RECENTLY. SHE WAS TOLD THAT A CHANGE IN MASK MIGHT BE NECCESSARY.     WE DISCUSSED OPTOINS OF MASKS, CHIN STRAPS AND TAPE.   PT TRIED ON  S[place] REG CHIN STRAP   RESPIRONICS CHIN STRAP - 1 ST CHOICE FOR CHIN STRAP  AIRFIT N20 SMALL  AIRFIT F30 SMALL  AIRFIT P30I SMALL   NUANCE PRO SMALL PILLOW  SHE HAD A GOOD FIT WITH ALL MASKS, NO LEAKAGE ISSUES.    PT REALLY DID NOT WANT TO USE A FULL FACE MASK OR CHIN STRAP. SHE CHOSE TO START WITH AN AIRFIT P30I AND ADJUST THE HUMIDIFIER ON HER CPAP. SUGGESTED SHE SPEAK WITH HER SLEEP PROVIDER, MAYBE HE CAN ADJUST THE PRESSURE.   PT RECIEVED NEW AIRFIT P30 I SMALL FITPACK.  WENT OVER CLEANING AND REORDERING SUPPLIES

## 2019-07-04 DIAGNOSIS — G62.9 NEUROPATHY: ICD-10-CM

## 2019-07-04 DIAGNOSIS — E03.4 HYPOTHYROIDISM DUE TO ACQUIRED ATROPHY OF THYROID: ICD-10-CM

## 2019-07-05 RX ORDER — LEVOTHYROXINE SODIUM 75 UG/1
TABLET ORAL
Qty: 30 TABLET | Refills: 0 | Status: SHIPPED | OUTPATIENT
Start: 2019-07-05 | End: 2019-08-05

## 2019-07-05 RX ORDER — GABAPENTIN 300 MG/1
CAPSULE ORAL
Qty: 90 CAPSULE | Refills: 0 | Status: SHIPPED | OUTPATIENT
Start: 2019-07-05 | End: 2019-08-05

## 2019-07-05 NOTE — TELEPHONE ENCOUNTER
30 days sent. Patient due for appointment and labs. [employer] message sent to patient.    Donita Denson, MSN, RN, PHN

## 2019-08-02 ASSESSMENT — ENCOUNTER SYMPTOMS
WEAKNESS: 0
PARESTHESIAS: 0
PALPITATIONS: 0
MYALGIAS: 0
HEADACHES: 0
SORE THROAT: 0
CONSTIPATION: 0
HEMATOCHEZIA: 0
DIZZINESS: 0
FREQUENCY: 0
NAUSEA: 0
JOINT SWELLING: 0
NERVOUS/ANXIOUS: 0
ABDOMINAL PAIN: 0
DYSURIA: 0
SHORTNESS OF BREATH: 0
HEMATURIA: 0
EYE PAIN: 0
ARTHRALGIAS: 0
HEARTBURN: 0
CHILLS: 0
BREAST MASS: 0
FEVER: 0
COUGH: 0
DIARRHEA: 0

## 2019-08-05 ENCOUNTER — OFFICE VISIT (OUTPATIENT)
Dept: FAMILY MEDICINE | Facility: CLINIC | Age: 64
End: 2019-08-05
Payer: COMMERCIAL

## 2019-08-05 ENCOUNTER — MYC MEDICAL ADVICE (OUTPATIENT)
Dept: FAMILY MEDICINE | Facility: CLINIC | Age: 64
End: 2019-08-05

## 2019-08-05 VITALS
HEIGHT: 63 IN | HEART RATE: 56 BPM | BODY MASS INDEX: 27.46 KG/M2 | TEMPERATURE: 98 F | SYSTOLIC BLOOD PRESSURE: 106 MMHG | WEIGHT: 155 LBS | RESPIRATION RATE: 16 BRPM | DIASTOLIC BLOOD PRESSURE: 70 MMHG | OXYGEN SATURATION: 97 %

## 2019-08-05 DIAGNOSIS — E03.4 HYPOTHYROIDISM DUE TO ACQUIRED ATROPHY OF THYROID: ICD-10-CM

## 2019-08-05 DIAGNOSIS — L71.9 ROSACEA: ICD-10-CM

## 2019-08-05 DIAGNOSIS — G62.9 NEUROPATHY: ICD-10-CM

## 2019-08-05 DIAGNOSIS — Z12.39 SPECIAL SCREENING EXAMINATION FOR NEOPLASM OF BREAST: ICD-10-CM

## 2019-08-05 DIAGNOSIS — Z00.00 ROUTINE GENERAL MEDICAL EXAMINATION AT A HEALTH CARE FACILITY: Primary | ICD-10-CM

## 2019-08-05 PROBLEM — I95.9 HYPOTENSION, UNSPECIFIED HYPOTENSION TYPE: Status: RESOLVED | Noted: 2018-07-09 | Resolved: 2019-08-05

## 2019-08-05 PROCEDURE — 36415 COLL VENOUS BLD VENIPUNCTURE: CPT | Performed by: FAMILY MEDICINE

## 2019-08-05 PROCEDURE — 80053 COMPREHEN METABOLIC PANEL: CPT | Performed by: FAMILY MEDICINE

## 2019-08-05 PROCEDURE — 99396 PREV VISIT EST AGE 40-64: CPT | Performed by: FAMILY MEDICINE

## 2019-08-05 PROCEDURE — 84443 ASSAY THYROID STIM HORMONE: CPT | Performed by: FAMILY MEDICINE

## 2019-08-05 PROCEDURE — 80061 LIPID PANEL: CPT | Performed by: FAMILY MEDICINE

## 2019-08-05 RX ORDER — GABAPENTIN 300 MG/1
CAPSULE ORAL
Qty: 270 CAPSULE | Refills: 3 | Status: SHIPPED | OUTPATIENT
Start: 2019-08-05 | End: 2020-08-03

## 2019-08-05 RX ORDER — DOXYCYCLINE 50 MG/1
100 CAPSULE ORAL DAILY
Qty: 90 CAPSULE | Refills: 2 | COMMUNITY
Start: 2019-08-05 | End: 2020-10-27

## 2019-08-05 RX ORDER — LEVOTHYROXINE SODIUM 75 UG/1
TABLET ORAL
Qty: 90 TABLET | Refills: 3 | Status: SHIPPED | OUTPATIENT
Start: 2019-08-05 | End: 2019-11-04

## 2019-08-05 ASSESSMENT — ENCOUNTER SYMPTOMS
HEADACHES: 0
FEVER: 0
CONSTIPATION: 0
JOINT SWELLING: 0
DYSURIA: 0
HEMATURIA: 0
DIZZINESS: 0
EYE PAIN: 0
HEMATOCHEZIA: 0
WEAKNESS: 0
MYALGIAS: 0
SORE THROAT: 0
NERVOUS/ANXIOUS: 0
BREAST MASS: 0
PARESTHESIAS: 0
ARTHRALGIAS: 0
SHORTNESS OF BREATH: 0
ABDOMINAL PAIN: 0
COUGH: 0
FREQUENCY: 0
DIARRHEA: 0
CHILLS: 0
PALPITATIONS: 0
NAUSEA: 0
HEARTBURN: 0

## 2019-08-05 ASSESSMENT — MIFFLIN-ST. JEOR: SCORE: 1228.24

## 2019-08-05 NOTE — PROGRESS NOTES
SUBJECTIVE:   CC: Steffi Klein is an 64 year old woman who presents for preventive health visit.     Healthy Habits:     Getting at least 3 servings of Calcium per day:  Yes    Bi-annual eye exam:  Yes    Dental care twice a year:  Yes    Sleep apnea or symptoms of sleep apnea:  Sleep apnea    Diet:  Other    Duration of exercise:  Other    Taking medications regularly:  Yes    Medication side effects:  None    PHQ-2 Total Score: 0    Additional concerns today:  No      Today's PHQ-2 Score:   PHQ-2 ( 1999 [employer]) 8/2/2019   Q1: Little interest or pleasure in doing things 0   Q2: Feeling down, depressed or hopeless 0   PHQ-2 Score 0   Q1: Little interest or pleasure in doing things Not at all   Q2: Feeling down, depressed or hopeless Not at all   PHQ-2 Score 0       Abuse: Current or Past(Physical, Sexual or Emotional)- No  Do you feel safe in your environment? Yes    Social History     Tobacco Use     Smoking status: Former Smoker     Smokeless tobacco: Never Used     Tobacco comment: quit 1970's   Substance Use Topics     Alcohol use: Yes     Frequency: Monthly or less     Drinks per session: 1 or 2     Binge frequency: Never     Comment: socially         Alcohol Use 8/2/2019   Prescreen: >3 drinks/day or >7 drinks/week? No   Prescreen: >3 drinks/day or >7 drinks/week? -       Reviewed orders with patient.  Reviewed health maintenance and updated orders accordingly - Yes  BP Readings from Last 3 Encounters:   08/05/19 106/70   01/15/19 101/68   12/10/18 111/73    Wt Readings from Last 3 Encounters:   08/05/19 70.3 kg (155 lb)   01/15/19 90.3 kg (199 lb)   12/10/18 87.7 kg (193 lb 6.4 oz)                  Patient Active Problem List   Diagnosis     Esophageal reflux     Anxiety     CARDIOVASCULAR SCREENING; LDL GOAL LESS THAN 160     Neuropathy     Hypothyroidism     Rosacea     Non morbid obesity due to excess calories     Onychomycosis     Macrocytosis     Hypotension, unspecified hypotension type      Bradycardia     Abnormal pulse oximetry     JEROD (obstructive sleep apnea)     Past Surgical History:   Procedure Laterality Date     C NONSPECIFIC PROCEDURE      3 NSVDs     D & C       ESOPHAGOSCOPY, GASTROSCOPY, DUODENOSCOPY (EGD), COMBINED  10/24/2014     [facility]     ESOPHAGOSCOPY, GASTROSCOPY, DUODENOSCOPY (EGD), COMBINED N/A 10/24/2014    Procedure: COMBINED ESOPHAGOSCOPY, GASTROSCOPY, DUODENOSCOPY (EGD);  Surgeon: Black Alva MD;  Location: [facility]     HC REMOVAL DEEP IMPLANT Left 2018    [facility]     OPEN REDUCTION INTERNAL FIXATION WRIST  12/3/2012    Procedure: OPEN REDUCTION INTERNAL FIXATION WRIST;  Left Wrist Open Reduction Internal Fixation        ORTHOPEDIC SURGERY      right ankle surgery     skin cancer excision       TONSILLECTOMY  as a child        Social History     Tobacco Use     Smoking status: Former Smoker     Smokeless tobacco: Never Used     Tobacco comment: quit    Substance Use Topics     Alcohol use: Yes     Frequency: Monthly or less     Drinks per session: 1 or 2     Binge frequency: Never     Comment: socially     Family History   Problem Relation Age of Onset     Cancer Father         lung and brain cancer,  at age of 69     Cancer Mother         lung cancer, surgically resected     Arthritis Mother         has had knee surgeries     Alzheimer Disease Mother      Cancer Paternal Aunt          from breast CA     Alzheimer Disease Maternal Grandfather          Current Outpatient Medications   Medication Sig Dispense Refill     Cholecalciferol (VITAMIN D) 1000 UNIT capsule Take 1 capsule by mouth daily. 100 capsule 12     diclofenac (VOLTAREN) 1 % topical gel Place onto the skin 4 times daily 100 g 0     doxycycline monohydrate 50 MG capsule TAKE 1 TO 2 CAPSULES BY MOUTH DAILY AS NEEDED 90 capsule 2     Emollient (CERAVE) LOTN Externally apply  topically.       gabapentin (NEURONTIN)  300 MG capsule TAKE 1 CAPSULE BY MOUTH THREE TIMES DAILY 90 capsule 0     levothyroxine (SYNTHROID/LEVOTHROID) 75 MCG tablet TAKE 1 TABLET BY MOUTH EVERY DAY 30 tablet 0     OMEPRAZOLE PO Take 20 mg by mouth daily       order for DME Equipment being ordered: elbow brace 1 Package 0     POTASSIUM CITRATE PO Take 10 mEq by mouth daily       ZYRTEC 10 MG OR TABS ONE TABLET DAILY 90 3     Allergies   Allergen Reactions     Penicillins Rash     Gets a lot of yeast build-up when on antibiotics      Recent Labs   Lab Test 07/09/18  1818 04/20/18  1624 07/18/17  0845  10/03/15  0822  06/18/13  0741  04/28/12  0757   LDL  --   --   --   --  146*  --  119  --  131*   HDL  --   --   --   --  56  --  61  --  54   TRIG  --   --   --   --  73  --  70  --  59   ALT  --  30 38  --   --   --  44  --  28   CR 0.90 0.68 0.93   < >  --   --  0.72   < > 0.83   GFRESTIMATED 63 88 62   < >  --   --  84   < > 72   GFRESTBLACK 77 >90 75   < >  --   --  >90   < > 87   POTASSIUM 4.0 3.9 4.2   < >  --   --  4.0   < > 4.1   TSH  --  1.46 2.52   < > 3.48   < > 5.61*  --  4.35    < > = values in this interval not displayed.        Mammogram Screening: Patient over age 50, mutual decision to screen reflected in health maintenance.    Pertinent mammograms are reviewed under the imaging tab.  History of abnormal Pap smear: NO - age 30-65 PAP every 5 years with negative HPV co-testing recommended  PAP / HPV Latest Ref Rng & Units 7/13/2015 5/14/2007 4/25/2006   PAP - NIL NIL NIL   HPV 16 DNA NEG Negative - -   HPV 18 DNA NEG Negative - -   OTHER HR HPV NEG Negative - -     Reviewed and updated as needed this visit by clinical staff  Tobacco  Allergies  Med Hx  Surg Hx  Fam Hx  Soc Hx        Reviewed and updated as needed this visit by Provider        PAST MEDICAL HISTORY:   Past Medical History:   Diagnosis Date     Abnormal pulse oximetry 7/9/2018     Anxiety 12/28/2009     Bradycardia 7/9/2018     CARDIOVASCULAR SCREENING; LDL GOAL LESS THAN  160 10/31/2010     Chronic pain     left wrist     Distal radius fracture 2013     Esophageal reflux 2006     Hypotension, unspecified hypotension type 2018     Hypothyroidism 2014     Irritable bowel syndrome      Lumbago      Macrocytosis 2018     Neuropathy 2014    R foot post op      Non morbid obesity due to excess calories 2017     Obesity 2015     Onychomycosis 2017     JEROD (obstructive sleep apnea) 10/30/2018     Osteoporosis 2012     Reflux esophagitis      Restless legs syndrome (RLS)      Rosacea 2015     Squamous cell carcinoma of shoulder, left      Wrist pain 2013       PAST SURGICAL HISTORY:   Past Surgical History:   Procedure Laterality Date     C NONSPECIFIC PROCEDURE      3 NSVDs     D & C       ESOPHAGOSCOPY, GASTROSCOPY, DUODENOSCOPY (EGD), COMBINED  10/24/2014     [facility]     ESOPHAGOSCOPY, GASTROSCOPY, DUODENOSCOPY (EGD), COMBINED N/A 10/24/2014    Procedure: COMBINED ESOPHAGOSCOPY, GASTROSCOPY, DUODENOSCOPY (EGD);  Surgeon: Black Alva MD;  Location: [facility]     HC REMOVAL DEEP IMPLANT Left 2018    [facility]     OPEN REDUCTION INTERNAL FIXATION WRIST  12/3/2012    Procedure: OPEN REDUCTION INTERNAL FIXATION WRIST;  Left Wrist Open Reduction Internal Fixation        ORTHOPEDIC SURGERY      right ankle surgery     skin cancer excision       TONSILLECTOMY  as a child        FAMILY HISTORY:   Family History   Problem Relation Age of Onset     Cancer Father         lung and brain cancer,  at age of 69     Cancer Mother         lung cancer, surgically resected     Arthritis Mother         has had knee surgeries     Alzheimer Disease Mother      Cancer Paternal Aunt          from breast CA     Alzheimer Disease Maternal Grandfather        SOCIAL HISTORY:   Social History     Tobacco Use     Smoking status: Former Smoker     Smokeless tobacco: Never Used  "    Tobacco comment: quit 1970's   Substance Use Topics     Alcohol use: Yes     Frequency: Monthly or less     Drinks per session: 1 or 2     Binge frequency: Never     Comment: socially         Review of Systems   Constitutional: Negative for chills and fever.        Volitional weight loss   HENT: Negative for congestion, ear pain, hearing loss and sore throat.    Eyes: Negative for pain and visual disturbance.   Respiratory: Negative for cough and shortness of breath.    Cardiovascular: Negative for chest pain, palpitations and peripheral edema.   Gastrointestinal: Negative for abdominal pain, constipation, diarrhea, heartburn, hematochezia and nausea.   Breasts:  Negative for tenderness, breast mass and discharge.   Genitourinary: Negative for dysuria, frequency, genital sores, hematuria, pelvic pain, urgency, vaginal bleeding and vaginal discharge.   Musculoskeletal: Negative for arthralgias, joint swelling and myalgias.   Skin: Negative for rash.   Neurological: Negative for dizziness, weakness, headaches and paresthesias.   Psychiatric/Behavioral: Negative for mood changes. The patient is not nervous/anxious.      This document serves as a record of the services and decisions personally performed and made by Raul Newman MD. It was created on his behalf by Asia Luo, a trained medical scribe. The creation of this document is based on the provider's statements to the medical scribe.  Asia Luo August 5, 2019 3:52 PM           OBJECTIVE:   /70 (BP Location: Right arm, Patient Position: Chair, Cuff Size: Adult Large)   Pulse 56   Temp 98  F (36.7  C) (Oral)   Resp 16   Ht 1.594 m (5' 2.75\")   Wt 70.3 kg (155 lb)   LMP 08/13/2007   SpO2 97%   BMI 27.68 kg/m    Physical Exam  GENERAL APPEARANCE: healthy, alert and no distress  EYES: Eyes grossly normal to inspection, PERRL and conjunctivae and sclerae normal  HENT: ear canals and TM's normal, nose and mouth without ulcers or lesions, " "oropharynx clear and oral mucous membranes moist  NECK: no adenopathy, no asymmetry, masses, or scars and thyroid normal to palpation  RESP: lungs clear to auscultation - no rales, rhonchi or wheezes  CV: regular rate and rhythm, normal S1 S2, no S3 or S4, no murmur, click or rub, no peripheral edema and peripheral pulses strong  ABDOMEN: soft, nontender, no hepatosplenomegaly, no masses and bowel sounds normal  SKIN: no suspicious lesions or rashes  NEURO: Normal strength and tone, sensory exam grossly normal, mentation intact and speech normal  PSYCH: mentation appears normal and affect normal/bright        ASSESSMENT/PLAN:   (Z00.00) Routine general medical examination at a health care facility  (primary encounter diagnosis)  Comment: health maintenance   Plan: Comprehensive metabolic panel          (G62.9) Neuropathy  Comment: responsive, refill  Plan: gabapentin (NEURONTIN) 300 MG capsule          (E03.4) Hypothyroidism due to acquired atrophy of thyroid  Comment: , fill , measure  Plan: TSH WITH FREE T4 REFLEX, levothyroxine         (SYNTHROID/LEVOTHROID) 75 MCG tablet          (Z12.31) Special screening examination for neoplasm of breast  Comment: routine  Plan: MA SCREENING DIGITAL BILAT - Future  (s+30),         Lipid panel reflex to direct LDL Non-fasting            COUNSELING:  Reviewed preventive health counseling, as reflected in patient instructions    Estimated body mass index is 27.68 kg/m  as calculated from the following:    Height as of this encounter: 1.594 m (5' 2.75\").    Weight as of this encounter: 70.3 kg (155 lb).  Pos strokes. [facility]   reports that she has quit smoking. She has never used smokeless tobacco.    Advised to get New Shingles Vaccination @ pharmacy  [facility]  Eat 2 tums nightly to increase calcium     Counseling Resources:  ATP IV Guidelines  Pooled Cohorts Equation Calculator  Breast Cancer Risk Calculator  FRAX Risk Assessment  ICSI Preventive " Guidelines  Dietary Guidelines for Americans, 2010  USDA's MyPlate  ASA Prophylaxis  Lung CA Screening    The information in this document, created by the medical scribe for me, accurately reflects the services I personally performed and the decisions made by me. I have reviewed and approved this document for accuracy prior to leaving the patient care area.  August 5, 2019 3:53 PM    Raul Newman MD  [facility]

## 2019-08-05 NOTE — PATIENT INSTRUCTIONS

## 2019-08-05 NOTE — PROGRESS NOTES
Answers for HPI/ROS submitted by the patient on 8/2/2019   Annual Exam:  Getting at least 3 servings of Calcium per day:: Yes  Diet:: Other  Taking medications regularly:: Yes  Medication side effects:: None  Bi-annual eye exam:: Yes  Dental care twice a year:: Yes  Sleep apnea or symptoms of sleep apnea:: Sleep apnea  abdominal pain: No  Blood in stool: No  Blood in urine: No  chest pain: No  chills: No  congestion: No  constipation: No  cough: No  diarrhea: No  dizziness: No  ear pain: No  eye pain: No  nervous/anxious: No  fever: No  frequency: No  genital sores: No  headaches: No  hearing loss: No  heartburn: No  arthralgias: No  joint swelling: No  peripheral edema: No  mood changes: No  myalgias: No  nausea: No  dysuria: No  palpitations: No  Skin sensation changes: No  sore throat: No  urgency: No  rash: No  shortness of breath: No  visual disturbance: No  weakness: No  pelvic pain: No  vaginal bleeding: No  vaginal discharge: No  tenderness: No  breast mass: No  breast discharge: No  Additional concerns today:: No  Duration of exercise:: Other

## 2019-08-06 LAB
ALBUMIN SERPL-MCNC: 3.8 G/DL (ref 3.4–5)
ALP SERPL-CCNC: 69 U/L (ref 40–150)
ALT SERPL W P-5'-P-CCNC: 39 U/L (ref 0–50)
ANION GAP SERPL CALCULATED.3IONS-SCNC: 8 MMOL/L (ref 3–14)
AST SERPL W P-5'-P-CCNC: 25 U/L (ref 0–45)
BILIRUB SERPL-MCNC: 0.4 MG/DL (ref 0.2–1.3)
BUN SERPL-MCNC: 17 MG/DL (ref 7–30)
CALCIUM SERPL-MCNC: 8.7 MG/DL (ref 8.5–10.1)
CHLORIDE SERPL-SCNC: 106 MMOL/L (ref 94–109)
CHOLEST SERPL-MCNC: 188 MG/DL
CO2 SERPL-SCNC: 28 MMOL/L (ref 20–32)
CREAT SERPL-MCNC: 0.72 MG/DL (ref 0.52–1.04)
GFR SERPL CREATININE-BSD FRML MDRD: >90 ML/MIN/{1.73_M2}
GLUCOSE SERPL-MCNC: 79 MG/DL (ref 70–99)
HDLC SERPL-MCNC: 54 MG/DL
LDLC SERPL CALC-MCNC: 119 MG/DL
NONHDLC SERPL-MCNC: 134 MG/DL
POTASSIUM SERPL-SCNC: 3.9 MMOL/L (ref 3.4–5.3)
PROT SERPL-MCNC: 7.2 G/DL (ref 6.8–8.8)
SODIUM SERPL-SCNC: 142 MMOL/L (ref 133–144)
TRIGL SERPL-MCNC: 76 MG/DL
TSH SERPL DL<=0.005 MIU/L-ACNC: 1.38 MU/L (ref 0.4–4)

## 2019-08-08 ENCOUNTER — MYC MEDICAL ADVICE (OUTPATIENT)
Dept: FAMILY MEDICINE | Facility: CLINIC | Age: 64
End: 2019-08-08

## 2019-09-28 ENCOUNTER — ALLIED HEALTH/NURSE VISIT (OUTPATIENT)
Dept: NURSING | Facility: CLINIC | Age: 64
End: 2019-09-28
Payer: COMMERCIAL

## 2019-09-28 ENCOUNTER — ANCILLARY PROCEDURE (OUTPATIENT)
Dept: MAMMOGRAPHY | Facility: CLINIC | Age: 64
End: 2019-09-28
Attending: FAMILY MEDICINE
Payer: COMMERCIAL

## 2019-09-28 DIAGNOSIS — Z12.39 SPECIAL SCREENING EXAMINATION FOR NEOPLASM OF BREAST: ICD-10-CM

## 2019-09-28 DIAGNOSIS — Z23 NEED FOR PROPHYLACTIC VACCINATION AND INOCULATION AGAINST INFLUENZA: Primary | ICD-10-CM

## 2019-09-28 PROCEDURE — 90471 IMMUNIZATION ADMIN: CPT

## 2019-09-28 PROCEDURE — 90686 IIV4 VACC NO PRSV 0.5 ML IM: CPT

## 2019-09-28 PROCEDURE — 77067 SCR MAMMO BI INCL CAD: CPT | Mod: TC

## 2019-09-28 PROCEDURE — 77063 BREAST TOMOSYNTHESIS BI: CPT | Mod: TC

## 2019-09-28 PROCEDURE — 99207 ZZC NO CHARGE NURSE ONLY: CPT

## 2019-11-04 ENCOUNTER — MYC REFILL (OUTPATIENT)
Dept: FAMILY MEDICINE | Facility: CLINIC | Age: 64
End: 2019-11-04

## 2019-11-04 DIAGNOSIS — E03.4 HYPOTHYROIDISM DUE TO ACQUIRED ATROPHY OF THYROID: ICD-10-CM

## 2019-11-05 RX ORDER — LEVOTHYROXINE SODIUM 75 UG/1
TABLET ORAL
Qty: 90 TABLET | Refills: 3 | Status: SHIPPED | OUTPATIENT
Start: 2019-11-05 | End: 2020-10-27

## 2019-11-05 NOTE — TELEPHONE ENCOUNTER
"Requested Prescriptions   Pending Prescriptions Disp Refills     levothyroxine (SYNTHROID/LEVOTHROID) 75 MCG tablet  Last Written Prescription Date:  8/5/2019  Last Fill Quantity: 90 tablet,  # refills: 3   Last office visit: 8/5/2019 with prescribing provider:  Trent   Future Office Visit:     90 tablet 3     Sig: TAKE 1 TABLET BY MOUTH EVERY DAY       Thyroid Protocol Passed - 11/4/2019  7:24 AM        Passed - Patient is 12 years or older        Passed - Recent (12 mo) or future (30 days) visit within the authorizing provider's specialty     Patient has had an office visit with the authorizing provider or a provider within the authorizing providers department within the previous 12 mos or has a future within next 30 days. See \"Patient Info\" tab in inbasket, or \"Choose Columns\" in Meds & Orders section of the refill encounter.              Passed - Medication is active on med list        Passed - Normal TSH on file in past 12 months     Recent Labs   Lab Test 08/05/19  1622   TSH 1.38              Passed - No active pregnancy on record     If patient is pregnant or has had a positive pregnancy test, please check TSH.          Passed - No positive pregnancy test in past 12 months     If patient is pregnant or has had a positive pregnancy test, please check TSH.            "

## 2019-11-05 NOTE — TELEPHONE ENCOUNTER
Prescription approved per [facility]. Last TSH and result note reviewed. Maura Francisco R.N.

## 2019-11-15 ENCOUNTER — MYC MEDICAL ADVICE (OUTPATIENT)
Dept: FAMILY MEDICINE | Facility: CLINIC | Age: 64
End: 2019-11-15

## 2019-11-26 DIAGNOSIS — L71.1 RHINOPHYMA: ICD-10-CM

## 2019-11-26 DIAGNOSIS — L71.9 ROSACEA: Primary | ICD-10-CM

## 2019-11-26 PROCEDURE — 84450 TRANSFERASE (AST) (SGOT): CPT | Performed by: FAMILY MEDICINE

## 2019-11-26 PROCEDURE — 36415 COLL VENOUS BLD VENIPUNCTURE: CPT | Performed by: FAMILY MEDICINE

## 2019-11-26 PROCEDURE — 84460 ALANINE AMINO (ALT) (SGPT): CPT | Performed by: FAMILY MEDICINE

## 2019-11-27 LAB
ALT SERPL W P-5'-P-CCNC: 37 U/L (ref 0–50)
AST SERPL W P-5'-P-CCNC: 25 U/L (ref 0–45)

## 2020-01-14 ENCOUNTER — OFFICE VISIT (OUTPATIENT)
Dept: SLEEP MEDICINE | Facility: CLINIC | Age: 65
End: 2020-01-14
Payer: COMMERCIAL

## 2020-01-14 VITALS
OXYGEN SATURATION: 98 % | WEIGHT: 139 LBS | DIASTOLIC BLOOD PRESSURE: 52 MMHG | SYSTOLIC BLOOD PRESSURE: 95 MMHG | BODY MASS INDEX: 24.63 KG/M2 | HEART RATE: 60 BPM | HEIGHT: 63 IN

## 2020-01-14 DIAGNOSIS — G47.33 OSA (OBSTRUCTIVE SLEEP APNEA): Primary | ICD-10-CM

## 2020-01-14 PROCEDURE — 99214 OFFICE O/P EST MOD 30 MIN: CPT | Performed by: PHYSICIAN ASSISTANT

## 2020-01-14 RX ORDER — LANOLIN ALCOHOL/MO/W.PET/CERES
CREAM (GRAM) TOPICAL DAILY
COMMUNITY
End: 2022-03-09

## 2020-01-14 ASSESSMENT — MIFFLIN-ST. JEOR: SCORE: 1155.75

## 2020-01-14 NOTE — PROGRESS NOTES
Sleep Follow-Up Visit:    Date on this visit: 1/14/2020    Steffi Klein comes in today for follow-up of her CPAP use for moderate JEROD and mild hypoxemia. She was initially seen at the [facility] for excessive sleepiness for at least 6 months. She wonders if she has had some degree of sleepiness for most of her life though. Her medical history is significant for GERD, neuropathy, hypothyroidism, IBS, macrocytosis.     Diagnostic PSG (9/14/2018): AHI was 20.5/hr (10% centrals), with desaturations down to 79%. S/He spent 8.1 minutes below 90% SpO2 and 5.4 minutes below 89%. RDI 27.5/hr.  REM RDI 56.5/hr.  Supine RDI N/A.  Periodic Limb Movement Index 0/hour.   Her CO2 ranged from 44 to 55 mmHg, consistent with mild hypoventilation.  Titration PSG showed CPAP of 9 cm was largely effective.     She is on auto CPAP 9-14 cm. She has lost 60 pounds with Medifast. Her weight has been about 139 lately. Her weight was 187 at the time of her last sleep study. She has been getting more dry mouth. She has increased the humidifier to 5. Last year, she had it at 3. The download does show some mouth leak. She uses the nasal pillows AirFit P30i. It can irritate her nostrils.  She sleeps well with CPAP. She sometimes does not put the mask on after getting up for the restroom. She does not know if she snores after going back to sleep. Her energy is good for the most part. She sometimes has low energy, which she attributes to changes in her diet. Her sleepiness is much better than it was in the past. If she gets tired after lunch now, she usually feels better if she has a little protein.     The compliance data shows that the patient used the CPAP for 30/30 nights, 83.3% of nights for >4 hours.  The 90th% pressure is 11.4 cm.  The average time in large leak is 0 sec.  The average nightly usage is 6:15.  The average AHI is 2.3/hr.      Past medical/surgical history, family history, social history, medications and  allergies were reviewed.      Problem List:  Patient Active Problem List    Diagnosis Date Noted     JEROD (obstructive sleep apnea) 10/30/2018     Priority: Medium     10/27/2018 [place] Titration Sleep Study (187.0 lbs) - Treatment was titrated to a pressure of CPAP 9 with an AHI of 6.1. Time Spent in REM supine at this pressure was 63.0.       Bradycardia 07/09/2018     Priority: Medium     Abnormal pulse oximetry 07/09/2018     Priority: Medium     Macrocytosis 04/22/2018     Priority: Medium     Onychomycosis 12/14/2017     Priority: Medium     Non morbid obesity due to excess calories 07/18/2017     Priority: Medium     Rosacea 07/13/2015     Priority: Medium     Hypothyroidism 07/02/2014     Priority: Medium     Neuropathy 06/30/2014     Priority: Medium     R foot post op       CARDIOVASCULAR SCREENING; LDL GOAL LESS THAN 160 10/31/2010     Priority: Medium     2.4% 10 yr risk 2015       Anxiety 12/28/2009     Priority: Medium     Esophageal reflux 07/02/2006     Priority: Medium     Famotidine (pepcid) 20 or ranitidine(zantac) 150 failure          Impression/Plan:    (G47.33) JEROD (obstructive sleep apnea)  (primary encounter diagnosis)  Comment: Steffi has lost 60 pounds and is wondering how she will know if she can get off of CPAP. She has been getting dry mouth. Her pressures are often at the lower limit, sometimes increasing for minor obstruction usually in association with mouth leak.  Plan: Comprehensive DME        Changed pressures to 6-14 cm. If you would like to see if you still need CPAP, take at least 3 days off of it. Try to get a report of if you are still snoring. You may uses a phone christine called [employer] to record a few days of your snoring (sleep in a room by yourself for that). See if your sleepiness returns. If any of your old symptoms arise, continue on CPAP. If you notice no difference, we can pursue some sort of re-evaluation (in lab vs home study vs oximetry) to see if you have any apnea.  Let me know if and when you would like to pursue that.      She will follow up with me in about 1 year(s).     Twenty-five minutes spent with patient, all of which were spent face-to-face counseling, consulting, coordinating plan of care.      Bennett Goltz, PA-C    CC: No ref. provider found

## 2020-01-14 NOTE — NURSING NOTE
"Chief Complaint   Patient presents with     RECHECK     f/u pap       Initial BP 95/52   Pulse 60   Ht 1.594 m (5' 2.76\")   Wt 63 kg (139 lb)   LMP 08/13/2007   SpO2 98%   BMI 24.81 kg/m   Estimated body mass index is 24.81 kg/m  as calculated from the following:    Height as of this encounter: 1.594 m (5' 2.76\").    Weight as of this encounter: 63 kg (139 lb).    Medication Reconciliation: complete        DME: yes    "

## 2020-01-14 NOTE — PATIENT INSTRUCTIONS
If you would like to see if you still need CPAP, take at least 3 days off of it. Try to get a report of if you are still snoring. You may uses a phone christine called [employer] to record a few days of your snoring (sleep in a room by yourself for that). See if your sleepiness returns. If any of your old symptoms arise, continue on CPAP. If you notice no difference, we can pursue some sort of re-evaluation (in lab vs home study vs oximetry) to see if you have any apnea. Let me know if and when you would like to pursue that.    Send me a note through [employer] if you are uncomfortable with the pressure change. You can also contact me if you would like me to check a download with the new pressures.

## 2020-08-01 DIAGNOSIS — G62.9 NEUROPATHY: ICD-10-CM

## 2020-08-02 ENCOUNTER — MYC REFILL (OUTPATIENT)
Dept: FAMILY MEDICINE | Facility: CLINIC | Age: 65
End: 2020-08-02

## 2020-08-02 DIAGNOSIS — G62.9 NEUROPATHY: ICD-10-CM

## 2020-08-03 RX ORDER — GABAPENTIN 300 MG/1
CAPSULE ORAL
Qty: 270 CAPSULE | Refills: 3 | Status: SHIPPED | OUTPATIENT
Start: 2020-08-03 | End: 2022-03-09

## 2020-10-27 ENCOUNTER — OFFICE VISIT (OUTPATIENT)
Dept: FAMILY MEDICINE | Facility: CLINIC | Age: 65
End: 2020-10-27
Payer: COMMERCIAL

## 2020-10-27 ENCOUNTER — MYC MEDICAL ADVICE (OUTPATIENT)
Dept: FAMILY MEDICINE | Facility: CLINIC | Age: 65
End: 2020-10-27

## 2020-10-27 VITALS
SYSTOLIC BLOOD PRESSURE: 104 MMHG | BODY MASS INDEX: 28.52 KG/M2 | DIASTOLIC BLOOD PRESSURE: 66 MMHG | TEMPERATURE: 97.7 F | HEART RATE: 64 BPM | WEIGHT: 155 LBS | RESPIRATION RATE: 16 BRPM | HEIGHT: 62 IN

## 2020-10-27 DIAGNOSIS — Z12.11 SPECIAL SCREENING FOR MALIGNANT NEOPLASMS, COLON: ICD-10-CM

## 2020-10-27 DIAGNOSIS — Z00.00 ROUTINE GENERAL MEDICAL EXAMINATION AT A HEALTH CARE FACILITY: Primary | ICD-10-CM

## 2020-10-27 DIAGNOSIS — L71.9 ROSACEA: Primary | ICD-10-CM

## 2020-10-27 DIAGNOSIS — G47.33 OSA (OBSTRUCTIVE SLEEP APNEA): ICD-10-CM

## 2020-10-27 DIAGNOSIS — L71.9 ROSACEA: ICD-10-CM

## 2020-10-27 DIAGNOSIS — G62.9 NEUROPATHY: ICD-10-CM

## 2020-10-27 DIAGNOSIS — E03.4 HYPOTHYROIDISM DUE TO ACQUIRED ATROPHY OF THYROID: ICD-10-CM

## 2020-10-27 PROCEDURE — 87624 HPV HI-RISK TYP POOLED RSLT: CPT | Performed by: FAMILY MEDICINE

## 2020-10-27 PROCEDURE — 36415 COLL VENOUS BLD VENIPUNCTURE: CPT | Performed by: FAMILY MEDICINE

## 2020-10-27 PROCEDURE — 99213 OFFICE O/P EST LOW 20 MIN: CPT | Mod: 25 | Performed by: FAMILY MEDICINE

## 2020-10-27 PROCEDURE — 99396 PREV VISIT EST AGE 40-64: CPT | Performed by: FAMILY MEDICINE

## 2020-10-27 PROCEDURE — 80053 COMPREHEN METABOLIC PANEL: CPT | Performed by: FAMILY MEDICINE

## 2020-10-27 PROCEDURE — 84443 ASSAY THYROID STIM HORMONE: CPT | Performed by: FAMILY MEDICINE

## 2020-10-27 PROCEDURE — G0145 SCR C/V CYTO,THINLAYER,RESCR: HCPCS | Performed by: FAMILY MEDICINE

## 2020-10-27 RX ORDER — LEVOTHYROXINE SODIUM 75 UG/1
TABLET ORAL
Qty: 90 TABLET | Refills: 3 | Status: SHIPPED | OUTPATIENT
Start: 2020-10-27 | End: 2021-09-27

## 2020-10-27 RX ORDER — TOPIRAMATE 50 MG/1
50 TABLET, FILM COATED ORAL DAILY
Qty: 10 TABLET | Refills: 0 | Status: SHIPPED | OUTPATIENT
Start: 2020-10-27 | End: 2022-03-09

## 2020-10-27 RX ORDER — DOXYCYCLINE 50 MG/1
100 CAPSULE ORAL DAILY
Qty: 90 CAPSULE | Refills: 2 | Status: CANCELLED | OUTPATIENT
Start: 2020-10-27

## 2020-10-27 RX ORDER — GABAPENTIN 300 MG/1
CAPSULE ORAL
Qty: 270 CAPSULE | Refills: 3 | Status: CANCELLED | OUTPATIENT
Start: 2020-10-27

## 2020-10-27 RX ORDER — TOPIRAMATE 100 MG/1
100 TABLET, FILM COATED ORAL DAILY
Qty: 90 TABLET | Refills: 3 | Status: SHIPPED | OUTPATIENT
Start: 2020-10-27 | End: 2021-10-04

## 2020-10-27 RX ORDER — DOXYCYCLINE 100 MG/1
100 TABLET ORAL DAILY
Qty: 90 TABLET | Refills: 3 | Status: SHIPPED | OUTPATIENT
Start: 2020-10-27 | End: 2021-10-04

## 2020-10-27 ASSESSMENT — ENCOUNTER SYMPTOMS
JOINT SWELLING: 0
PARESTHESIAS: 0
MYALGIAS: 0
ABDOMINAL PAIN: 0
FREQUENCY: 0
BREAST MASS: 0
CHILLS: 0
EYE PAIN: 0
FEVER: 0
NAUSEA: 0
PALPITATIONS: 0
DIZZINESS: 0
HEARTBURN: 0
HEMATOCHEZIA: 0
ARTHRALGIAS: 0
DYSURIA: 0
WEAKNESS: 0
NERVOUS/ANXIOUS: 0
SORE THROAT: 0
CONSTIPATION: 0
HEADACHES: 0
SHORTNESS OF BREATH: 0
COUGH: 0
DIARRHEA: 0
HEMATURIA: 0

## 2020-10-27 ASSESSMENT — MIFFLIN-ST. JEOR: SCORE: 1203.65

## 2020-10-27 NOTE — PROGRESS NOTES
"   SUBJECTIVE:   CC: Steffi Klein is an 65 year old woman who presents for preventive health visit.     {Split Bill scripting  The purpose of this visit is to discuss your medical history and prevent health problems before you are sick. You may be responsible for a co-pay, coinsurance, or deductible if your visit today includes services such as checking on a sore throat, having an x-ray or lab test, or treating and evaluating a new or existing condition :067444}  Patient has been advised of split billing requirements and indicates understanding: {Yes and No:794093}  Healthy Habits:    Do you get at least three servings of calcium containing foods daily (dairy, green leafy vegetables, etc.)? { :734463::\"yes\"}    Amount of exercise or daily activities, outside of work: { :622255}    Problems taking medications regularly { :934548::\"No\"}    Medication side effects: { :677342::\"No\"}    Have you had an eye exam in the past two years? { :461013}    Do you see a dentist twice per year? { :787871}    Do you have sleep apnea, excessive snoring or daytime drowsiness?{ :899190}  {Outside tests to abstract? :077041}    {additional problems to add (Optional):683323}    Today's PHQ-2 Score:   PHQ-2 ( 1999 [employer]) 10/27/2020 8/2/2019   Q1: Little interest or pleasure in doing things 0 0   Q2: Feeling down, depressed or hopeless 0 0   PHQ-2 Score 0 0   Q1: Little interest or pleasure in doing things Not at all Not at all   Q2: Feeling down, depressed or hopeless Not at all Not at all   PHQ-2 Score 0 0     {PHQ-2 LOOK IN ASSESSMENTS (Optional) :040104}  Abuse: Current or Past(Physical, Sexual or Emotional)- {YES/NO/NA:500421}  Do you feel safe in your environment? {YES/NO/NA:313999}    Have you ever done Advance Care Planning? (For example, a Health Directive, POLST, or a discussion with a medical provider or your loved ones about your wishes): { :143993}    Social History     Tobacco Use     Smoking status: Former Smoker     " "Smokeless tobacco: Never Used     Tobacco comment: quit 1970's   Substance Use Topics     Alcohol use: Yes     Frequency: Monthly or less     Drinks per session: 1 or 2     Binge frequency: Never     Comment: socially     If you drink alcohol do you typically have >3 drinks per day or >7 drinks per week? {ETOH :282717}                     Reviewed orders with patient.  Reviewed health maintenance and updated orders accordingly - {Yes/No:060839::\"Yes\"}  {Chronicprobdata (Optional):520839}    {Mammo Decision Support (Optional):803043}    Pertinent mammograms are reviewed under the imaging tab.  History of abnormal Pap smear: {PAP HX:133144}  PAP / HPV Latest Ref Rng & Units 7/13/2015 5/14/2007 4/25/2006   PAP - NIL NIL NIL   HPV 16 DNA NEG Negative - -   HPV 18 DNA NEG Negative - -   OTHER HR HPV NEG Negative - -     Reviewed and updated as needed this visit by clinical staff                 Reviewed and updated as needed this visit by Provider                {HISTORY OPTIONS (Optional):955109}    ROS:  { :391671}    OBJECTIVE:   LMP 08/13/2007   EXAM:  {Exam Choices:766670}    {Diagnostic Test Results (Optional):694343::\"Diagnostic Test Results:\",\"Labs reviewed in Epic\"}    ASSESSMENT/PLAN:   {Diag Picklist:557715}    Patient has been advised of split billing requirements and indicates understanding: {YES / NO:575422::\"Yes\"}  COUNSELING:   {FEMALE COUNSELING MESSAGES:133923::\"Reviewed preventive health counseling, as reflected in patient instructions\"}    Estimated body mass index is 24.81 kg/m  as calculated from the following:    Height as of 1/14/20: 1.594 m (5' 2.76\").    Weight as of 1/14/20: 63 kg (139 lb).    {Weight Management Plan (ACO) Complete if BMI is abnormal-  Ages 18-64  BMI >24.9.  Age 65+ with BMI <23 or >30 (Optional):093373}    She reports that she has quit smoking. She has never used smokeless tobacco.      Counseling Resources:  ATP IV Guidelines  Pooled Cohorts Equation Calculator  Breast " Cancer Risk Calculator  BRCA-Related Cancer Risk Assessment: FHS-7 Tool  FRAX Risk Assessment  ICSI Preventive Guidelines  Dietary Guidelines for Americans, 2010  USDA's MyPlate  ASA Prophylaxis  Lung CA Screening    Raul Newman MD  [facility]

## 2020-10-27 NOTE — PATIENT INSTRUCTIONS

## 2020-10-27 NOTE — PROGRESS NOTES
SUBJECTIVE:   CC: Steffi Klein is an 65 year old woman who presents for preventive health visit.       Patient has been advised of split billing requirements and indicates understanding: Yes  Healthy Habits:     Getting at least 3 servings of Calcium per day:  Yes    Bi-annual eye exam:  Yes    Dental care twice a year:  Yes    Sleep apnea or symptoms of sleep apnea:  None and Sleep apnea    Diet:  Carbohydrate counting    Frequency of exercise:  4-5 days/week    Duration of exercise:  15-30 minutes    Taking medications regularly:  Yes    Medication side effects:  None    PHQ-2 Total Score: 0    Additional concerns today:  No              Today's PHQ-2 Score:   PHQ-2 (  [employer]) 10/27/2020   Q1: Little interest or pleasure in doing things 0   Q2: Feeling down, depressed or hopeless 0   PHQ-2 Score 0   Q1: Little interest or pleasure in doing things Not at all   Q2: Feeling down, depressed or hopeless Not at all   PHQ-2 Score 0       Abuse: Current or Past (Physical, Sexual or Emotional) - NO  Do you feel safe in your environment? YES    Have you ever done Advance Care Planning? (For example, a Health Directive, POLST, or a discussion with a medical provider or your loved ones about your wishes): No, advance care planning information given to patient to review.  Advanced care planning was discussed at today's visit.    Social History     Tobacco Use     Smoking status: Former Smoker     Packs/day: 0.50     Years: 7.00     Pack years: 3.50     Types: Cigarettes     Quit date: 1977     Years since quittin.9     Smokeless tobacco: Never Used     Tobacco comment: quit    Substance Use Topics     Alcohol use: Yes     Frequency: Monthly or less     Drinks per session: 1 or 2     Binge frequency: Never     Comment: socially     If you drink alcohol do you typically have >3 drinks per day or >7 drinks per week? No    Alcohol Use 10/27/2020   Prescreen: >3 drinks/day or >7 drinks/week? No    Prescreen: >3 drinks/day or >7 drinks/week? -   No flowsheet data found.    Reviewed orders with patient.  Reviewed health maintenance and updated orders accordingly - Yes      Mammogram Screening: Patient over age 50, mutual decision to screen reflected in health maintenance.    Pertinent mammograms are reviewed under the imaging tab.  History of abnormal Pap smear: NO - age 30-65 PAP every 5 years with negative HPV co-testing recommended  PAP / HPV Latest Ref Rng & Units 7/13/2015 5/14/2007 4/25/2006   PAP - NIL NIL NIL   HPV 16 DNA NEG Negative - -   HPV 18 DNA NEG Negative - -   OTHER HR HPV NEG Negative - -     Reviewed and updated as needed this visit by clinical staff  Tobacco  Allergies  Meds  Problems  Med Hx  Surg Hx  Fam Hx  Soc Hx          Reviewed and updated as needed this visit by Provider                Past Medical History:   Diagnosis Date     Abnormal pulse oximetry 7/9/2018     Anxiety 12/28/2009     Bradycardia 7/9/2018     CARDIOVASCULAR SCREENING; LDL GOAL LESS THAN 160 10/31/2010     Chronic pain     left wrist     Distal radius fracture 1/28/2013     Esophageal reflux 7/2/2006     Hypotension, unspecified hypotension type 7/9/2018     Hypothyroidism 7/2/2014     Irritable bowel syndrome      Lumbago      Macrocytosis 4/22/2018     Neuropathy 6/30/2014    R foot post op      Non morbid obesity due to excess calories 7/18/2017     Obesity 7/13/2015     Onychomycosis 12/14/2017     JEROD (obstructive sleep apnea) 10/30/2018     Osteoporosis 2012     Reflux esophagitis      Restless legs syndrome (RLS)      Rosacea 7/13/2015     Squamous cell carcinoma of shoulder, left      Wrist pain 1/28/2013       Past Surgical History:   Procedure Laterality Date     COLONOSCOPY  5/2007     D & C       ESOPHAGOSCOPY, GASTROSCOPY, DUODENOSCOPY (EGD), COMBINED  10/24/2014    Dr.Levine MELTON     ESOPHAGOSCOPY, GASTROSCOPY, DUODENOSCOPY (EGD), COMBINED N/A 10/24/2014    Procedure: COMBINED ESOPHAGOSCOPY,  GASTROSCOPY, DUODENOSCOPY (EGD);  Surgeon: Black Alva MD;  Location: [facility]     HC REMOVAL DEEP IMPLANT Left 2018    [facility]     OPEN REDUCTION INTERNAL FIXATION WRIST  12/3/2012    Procedure: OPEN REDUCTION INTERNAL FIXATION WRIST;  Left Wrist Open Reduction Internal Fixation        ORTHOPEDIC SURGERY      right ankle surgery     skin cancer excision       TONSILLECTOMY  as a child      ZZC NONSPECIFIC PROCEDURE      3 NSVDs       Family History   Problem Relation Age of Onset     Cancer Father         lung and brain cancer,  at age of 69     Other Cancer Father         Inoperable Brain Cancer     Cancer Mother         lung cancer, surgically resected     Arthritis Mother         has had knee surgeries     Alzheimer Disease Mother      Thyroid Disease Mother      Obesity Mother      Cancer Paternal Aunt          from breast CA     Alzheimer Disease Maternal Grandfather      Diabetes Other         Type 1     Diabetes Daughter         Type 1     Breast Cancer Other         Aunt (my Dad's sister)     Breast Cancer Cousin      Breast Cancer Cousin      Breast Cancer Niece         Bracket 1 breast cancer     Colon Cancer Other          from colon cancer     Anxiety Disorder Daughter        Social History     Tobacco Use     Smoking status: Former Smoker     Packs/day: 0.50     Years: 7.00     Pack years: 3.50     Types: Cigarettes     Quit date: 1977     Years since quittin.9     Smokeless tobacco: Never Used     Tobacco comment: quit 's   Substance Use Topics     Alcohol use: Yes     Frequency: Monthly or less     Drinks per session: 1 or 2     Binge frequency: Never     Comment: socially         Review of Systems   Constitutional: Negative for chills and fever.   HENT: Negative for congestion, ear pain, hearing loss and sore throat.    Eyes: Negative for pain and visual disturbance.   Respiratory: Negative  "for cough and shortness of breath.    Cardiovascular: Negative for chest pain, palpitations and peripheral edema.   Gastrointestinal: Negative for abdominal pain, constipation, diarrhea, heartburn, hematochezia and nausea.   Breasts:  Negative for tenderness, breast mass and discharge.   Genitourinary: Negative for dysuria, frequency, genital sores, hematuria, pelvic pain, urgency, vaginal bleeding and vaginal discharge.   Musculoskeletal: Negative for arthralgias, joint swelling and myalgias.   Skin: Negative for rash.   Neurological:        See additional note   Psychiatric/Behavioral: Negative for mood changes. The patient is not nervous/anxious.           OBJECTIVE:   /66 (BP Location: Right arm, Patient Position: Sitting, Cuff Size: Adult Regular)   Pulse 64   Temp 97.7  F (36.5  C) (Oral)   Resp 16   Ht 1.563 m (5' 1.52\")   Wt 70.3 kg (155 lb)   LMP 08/13/2007   BMI 28.80 kg/m    Physical Exam  Constitutional:       Appearance: Normal appearance.   HENT:      Head: Normocephalic.      Right Ear: Tympanic membrane and ear canal normal.      Left Ear: Tympanic membrane and ear canal normal.      Nose: Nose normal.      Mouth/Throat:      Mouth: Mucous membranes are moist.   Eyes:      Conjunctiva/sclera: Conjunctivae normal.      Pupils: Pupils are equal, round, and reactive to light.   Neck:      Musculoskeletal: Neck supple.   Cardiovascular:      Rate and Rhythm: Normal rate and regular rhythm.      Heart sounds: Normal heart sounds.   Pulmonary:      Breath sounds: Normal breath sounds.   Abdominal:      General: Bowel sounds are normal.      Palpations: Abdomen is soft.   Genitourinary:     General: Normal vulva.      Comments: Atrophic mucosa.  Pap taken.  No other findings  Skin:     General: Skin is warm and dry.   Neurological:      Mental Status: She is alert and oriented to person, place, and time.   Psychiatric:         Mood and Affect: Mood normal.               ASSESSMENT/PLAN: " "    Problem List Items Addressed This Visit        Nervous and Auditory    Neuropathy     Responsive to gabapentin but only if carefully scheduled.  Symptoms returned with a few hours delay.  Discussed alternate therapies.  Change to Topamax         Relevant Medications    topiramate (TOPAMAX) 100 MG tablet    topiramate (TOPAMAX) 50 MG tablet       Respiratory    JEROD (obstructive sleep apnea)     Patient continues to use CPAP.  Sleep medicine has suggested that her apnea may be resolved with her weight loss, but she has not yet tried sleeping without            Endocrine    Hypothyroidism     Periodic TSH         Relevant Medications    levothyroxine (SYNTHROID/LEVOTHROID) 75 MCG tablet    Other Relevant Orders    TSH WITH FREE T4 REFLEX (Completed)       Musculoskeletal and Integumentary    Rosacea     Responsive to doxycycline.  She would like us to take over prescription         Relevant Medications    doxycycline monohydrate (ADOXA) 100 MG tablet      Other Visit Diagnoses     Routine general medical examination at a health care facility    -  Primary    Relevant Orders    Pap imaged thin layer screen with HPV - recommended age 30 - 65 (Completed)    HPV High Risk Types DNA Cervical (Completed)    Comprehensive metabolic panel (Completed)    Special screening for malignant neoplasms, colon        Relevant Orders    Fecal colorectal cancer screen FIT          Patient has been advised of split billing requirements and indicates understanding: Yes  COUNSELING:  Reviewed preventive health counseling, as reflected in patient instructions    Estimated body mass index is 28.8 kg/m  as calculated from the following:    Height as of this encounter: 1.563 m (5' 1.52\").    Weight as of this encounter: 70.3 kg (155 lb).    Weight management plan: She will redouble her efforts    She reports that she quit smoking about 42 years ago. Her smoking use included cigarettes. She has a 3.50 pack-year smoking history. She has never " used smokeless tobacco.      Counseling Resources:  ATP IV Guidelines  Pooled Cohorts Equation Calculator  Breast Cancer Risk Calculator  BRCA-Related Cancer Risk Assessment: FHS-7 Tool  FRAX Risk Assessment  ICSI Preventive Guidelines  Dietary Guidelines for Americans, 2010  USDA's MyPlate  ASA Prophylaxis  Lung CA Screening    Raul Newman MD  [facility]

## 2020-10-27 NOTE — PROGRESS NOTES
"   Answers for HPI/ROS submitted by the patient on 10/27/2020   Annual Exam:  Frequency of exercise:: 4-5 days/week  Getting at least 3 servings of Calcium per day:: Yes  Diet:: Carbohydrate counting  Taking medications regularly:: Yes  Medication side effects:: None  Bi-annual eye exam:: Yes  Dental care twice a year:: Yes  Sleep apnea or symptoms of sleep apnea:: None, Sleep apnea  abdominal pain: No  Blood in stool: No  Blood in urine: No  chest pain: No  chills: No  congestion: No  constipation: No  cough: No  diarrhea: No  dizziness: No  ear pain: No  eye pain: No  nervous/anxious: No  fever: No  frequency: No  genital sores: No  headaches: No  hearing loss: No  heartburn: No  arthralgias: No  joint swelling: No  peripheral edema: No  mood changes: No  myalgias: No  nausea: No  dysuria: No  palpitations: No  Skin sensation changes: No  sore throat: No  urgency: No  rash: No  shortness of breath: No  visual disturbance: No  weakness: No  pelvic pain: No  vaginal bleeding: No  vaginal discharge: No  tenderness: No  breast mass: No  breast discharge: No  Additional concerns today:: No  Duration of exercise:: 15-30 minutes    Neuropathy right foot postop, responsive to gabapentin.  Hypoesthesia remains.  However, if her scheduled administration is a few hours late, pain ensues.  ROS no trauma  /66 (BP Location: Right arm, Patient Position: Sitting, Cuff Size: Adult Regular)   Pulse 64   Temp 97.7  F (36.5  C) (Oral)   Resp 16   Ht 1.563 m (5' 1.52\")   Wt 70.3 kg (155 lb)   LMP 08/13/2007   BMI 28.80 kg/m    No pedal asymmetries are obvious  ASSESSMENT / PLAN:    (G62.9) Neuropathy  Comment: Taper off gabapentin on to topiramate  Plan: topiramate (TOPAMAX) 100 MG tablet, topiramate         (TOPAMAX) 50 MG tablet               Raul Newman MD        "

## 2020-10-27 NOTE — NURSING NOTE
"Chief Complaint   Patient presents with     Physical     PHYSICAL AND PAP     Initial /66 (BP Location: Right arm, Patient Position: Sitting, Cuff Size: Adult Regular)   Pulse 64   Temp 97.7  F (36.5  C) (Oral)   Resp 16   Ht 1.563 m (5' 1.52\")   Wt 70.3 kg (155 lb)   LMP 08/13/2007   BMI 28.80 kg/m   Estimated body mass index is 28.8 kg/m  as calculated from the following:    Height as of this encounter: 1.563 m (5' 1.52\").    Weight as of this encounter: 70.3 kg (155 lb).  BP completed using cuff size regular LONG right arm    Lisa Magill, CMA    "

## 2020-10-28 LAB
ALBUMIN SERPL-MCNC: 3.7 G/DL (ref 3.4–5)
ALP SERPL-CCNC: 57 U/L (ref 40–150)
ALT SERPL W P-5'-P-CCNC: 41 U/L (ref 0–50)
ANION GAP SERPL CALCULATED.3IONS-SCNC: 2 MMOL/L (ref 3–14)
AST SERPL W P-5'-P-CCNC: 30 U/L (ref 0–45)
BILIRUB SERPL-MCNC: 0.3 MG/DL (ref 0.2–1.3)
BUN SERPL-MCNC: 21 MG/DL (ref 7–30)
CALCIUM SERPL-MCNC: 9 MG/DL (ref 8.5–10.1)
CHLORIDE SERPL-SCNC: 106 MMOL/L (ref 94–109)
CO2 SERPL-SCNC: 33 MMOL/L (ref 20–32)
CREAT SERPL-MCNC: 0.83 MG/DL (ref 0.52–1.04)
GFR SERPL CREATININE-BSD FRML MDRD: 75 ML/MIN/{1.73_M2}
GLUCOSE SERPL-MCNC: 79 MG/DL (ref 70–99)
POTASSIUM SERPL-SCNC: 3.9 MMOL/L (ref 3.4–5.3)
PROT SERPL-MCNC: 7.2 G/DL (ref 6.8–8.8)
SODIUM SERPL-SCNC: 141 MMOL/L (ref 133–144)
TSH SERPL DL<=0.005 MIU/L-ACNC: 1.23 MU/L (ref 0.4–4)

## 2020-10-28 NOTE — ASSESSMENT & PLAN NOTE
Patient continues to use CPAP.  Sleep medicine has suggested that her apnea may be resolved with her weight loss, but she has not yet tried sleeping without

## 2020-10-28 NOTE — ASSESSMENT & PLAN NOTE
Responsive to gabapentin but only if carefully scheduled.  Symptoms returned with a few hours delay.  Discussed alternate therapies.  Change to Topamax

## 2020-10-29 LAB
COPATH REPORT: NORMAL
PAP: NORMAL

## 2020-10-29 NOTE — RESULT ENCOUNTER NOTE
These tests are OK. The trivial Carbon Dioxide elevation probably is sleep apnea.....  Raul Newman MD

## 2020-10-30 DIAGNOSIS — Z12.11 SPECIAL SCREENING FOR MALIGNANT NEOPLASMS, COLON: ICD-10-CM

## 2020-10-30 LAB — HEMOCCULT STL QL IA: NEGATIVE

## 2020-10-30 PROCEDURE — 82274 ASSAY TEST FOR BLOOD FECAL: CPT | Performed by: FAMILY MEDICINE

## 2020-11-02 LAB
FINAL DIAGNOSIS: NORMAL
HPV HR 12 DNA CVX QL NAA+PROBE: NEGATIVE
HPV16 DNA SPEC QL NAA+PROBE: NEGATIVE
HPV18 DNA SPEC QL NAA+PROBE: NEGATIVE
SPECIMEN DESCRIPTION: NORMAL
SPECIMEN SOURCE CVX/VAG CYTO: NORMAL

## 2021-01-01 NOTE — TELEPHONE ENCOUNTER
gabapentin (NEURONTIN) 300 MG     Last Written Prescription Date: 07/11/2017  Last Fill Quantity: 270,07/11/2017  # refills: 0   Last Office Visit with G, [facility] or [facility] prescribing provider: 07/18/2017-Dr Newman                                                [phone]

## 2021-09-18 ENCOUNTER — HEALTH MAINTENANCE LETTER (OUTPATIENT)
Age: 66
End: 2021-09-18

## 2021-09-25 DIAGNOSIS — E03.4 HYPOTHYROIDISM DUE TO ACQUIRED ATROPHY OF THYROID: ICD-10-CM

## 2021-09-27 RX ORDER — LEVOTHYROXINE SODIUM 75 UG/1
TABLET ORAL
Qty: 90 TABLET | Refills: 0 | Status: SHIPPED | OUTPATIENT
Start: 2021-09-27 | End: 2021-12-23

## 2022-01-04 ENCOUNTER — MYC REFILL (OUTPATIENT)
Dept: FAMILY MEDICINE | Facility: CLINIC | Age: 67
End: 2022-01-04
Payer: COMMERCIAL

## 2022-01-04 DIAGNOSIS — G62.9 NEUROPATHY: ICD-10-CM

## 2022-01-04 DIAGNOSIS — L71.9 ROSACEA: ICD-10-CM

## 2022-01-05 RX ORDER — DOXYCYCLINE 100 MG/1
TABLET ORAL
Qty: 90 TABLET | Refills: 0 | Status: SHIPPED | OUTPATIENT
Start: 2022-01-05 | End: 2022-03-09

## 2022-01-05 RX ORDER — TOPIRAMATE 100 MG/1
TABLET, FILM COATED ORAL
Qty: 90 TABLET | Refills: 0 | Status: SHIPPED | OUTPATIENT
Start: 2022-01-05 | End: 2022-03-09

## 2022-01-05 NOTE — TELEPHONE ENCOUNTER
Routing refill request to provider for review/approval because:  Drug not on the [facility] refill protocol   Labs not current:  Last done in 2018

## 2022-01-06 RX ORDER — TOPIRAMATE 100 MG/1
100 TABLET, FILM COATED ORAL DAILY
Qty: 120 TABLET | Refills: 0 | OUTPATIENT
Start: 2022-01-06

## 2022-01-06 RX ORDER — DOXYCYCLINE 100 MG/1
100 TABLET ORAL DAILY
Qty: 90 TABLET | Refills: 0 | OUTPATIENT
Start: 2022-01-06

## 2022-01-06 NOTE — TELEPHONE ENCOUNTER
Already approved. topiramate (TOPAMAX) 100 MG tablet 90 tablet 0 1/5/2022  Already approved. doxycycline monohydrate (ADOXA) 100 MG tablet 90 tablet 0 1/5/2022  Alina Hussein RN

## 2022-01-08 ENCOUNTER — HEALTH MAINTENANCE LETTER (OUTPATIENT)
Age: 67
End: 2022-01-08

## 2022-03-09 ENCOUNTER — OFFICE VISIT (OUTPATIENT)
Dept: FAMILY MEDICINE | Facility: CLINIC | Age: 67
End: 2022-03-09
Payer: COMMERCIAL

## 2022-03-09 VITALS
SYSTOLIC BLOOD PRESSURE: 110 MMHG | RESPIRATION RATE: 12 BRPM | HEART RATE: 64 BPM | DIASTOLIC BLOOD PRESSURE: 70 MMHG | HEIGHT: 63 IN | OXYGEN SATURATION: 100 % | WEIGHT: 153 LBS | TEMPERATURE: 98.3 F | BODY MASS INDEX: 27.11 KG/M2

## 2022-03-09 DIAGNOSIS — L71.9 ROSACEA: ICD-10-CM

## 2022-03-09 DIAGNOSIS — G62.9 NEUROPATHY: ICD-10-CM

## 2022-03-09 DIAGNOSIS — E03.4 HYPOTHYROIDISM DUE TO ACQUIRED ATROPHY OF THYROID: ICD-10-CM

## 2022-03-09 DIAGNOSIS — H93.13 TINNITUS, BILATERAL: ICD-10-CM

## 2022-03-09 DIAGNOSIS — L98.9 SKIN EROSION: ICD-10-CM

## 2022-03-09 DIAGNOSIS — Z00.00 ENCOUNTER FOR PREVENTIVE HEALTH EXAMINATION: Primary | ICD-10-CM

## 2022-03-09 DIAGNOSIS — M75.102 ROTATOR CUFF SYNDROME, LEFT: ICD-10-CM

## 2022-03-09 DIAGNOSIS — Z12.31 VISIT FOR SCREENING MAMMOGRAM: ICD-10-CM

## 2022-03-09 DIAGNOSIS — Z12.11 SCREEN FOR COLON CANCER: ICD-10-CM

## 2022-03-09 DIAGNOSIS — D75.89 MACROCYTOSIS: ICD-10-CM

## 2022-03-09 LAB
BASOPHILS # BLD AUTO: 0 10E3/UL (ref 0–0.2)
BASOPHILS NFR BLD AUTO: 0 %
EOSINOPHIL # BLD AUTO: 0.1 10E3/UL (ref 0–0.7)
EOSINOPHIL NFR BLD AUTO: 2 %
ERYTHROCYTE [DISTWIDTH] IN BLOOD BY AUTOMATED COUNT: 12.7 % (ref 10–15)
HCT VFR BLD AUTO: 40 % (ref 35–47)
HGB BLD-MCNC: 13.1 G/DL (ref 11.7–15.7)
LYMPHOCYTES # BLD AUTO: 1.8 10E3/UL (ref 0.8–5.3)
LYMPHOCYTES NFR BLD AUTO: 30 %
MCH RBC QN AUTO: 32 PG (ref 26.5–33)
MCHC RBC AUTO-ENTMCNC: 32.8 G/DL (ref 31.5–36.5)
MCV RBC AUTO: 98 FL (ref 78–100)
MONOCYTES # BLD AUTO: 0.4 10E3/UL (ref 0–1.3)
MONOCYTES NFR BLD AUTO: 6 %
NEUTROPHILS # BLD AUTO: 3.8 10E3/UL (ref 1.6–8.3)
NEUTROPHILS NFR BLD AUTO: 62 %
PLATELET # BLD AUTO: 209 10E3/UL (ref 150–450)
RBC # BLD AUTO: 4.09 10E6/UL (ref 3.8–5.2)
VIT B12 SERPL-MCNC: 995 PG/ML (ref 193–986)
WBC # BLD AUTO: 6.1 10E3/UL (ref 4–11)

## 2022-03-09 PROCEDURE — 99214 OFFICE O/P EST MOD 30 MIN: CPT | Mod: 25 | Performed by: FAMILY MEDICINE

## 2022-03-09 PROCEDURE — 82607 VITAMIN B-12: CPT | Performed by: FAMILY MEDICINE

## 2022-03-09 PROCEDURE — 99396 PREV VISIT EST AGE 40-64: CPT | Performed by: FAMILY MEDICINE

## 2022-03-09 PROCEDURE — 36415 COLL VENOUS BLD VENIPUNCTURE: CPT | Performed by: FAMILY MEDICINE

## 2022-03-09 PROCEDURE — 80050 GENERAL HEALTH PANEL: CPT | Performed by: FAMILY MEDICINE

## 2022-03-09 RX ORDER — DOXYCYCLINE 100 MG/1
100 TABLET ORAL DAILY
Qty: 90 TABLET | Refills: 3 | Status: SHIPPED | OUTPATIENT
Start: 2022-03-09 | End: 2023-04-10

## 2022-03-09 RX ORDER — LANOLIN ALCOHOL/MO/W.PET/CERES
1000 CREAM (GRAM) TOPICAL DAILY
Qty: 100 TABLET | Refills: 3 | Status: SHIPPED | OUTPATIENT
Start: 2022-03-09 | End: 2023-04-18

## 2022-03-09 RX ORDER — LEVOTHYROXINE SODIUM 75 UG/1
75 TABLET ORAL DAILY
Qty: 90 TABLET | Refills: 3 | Status: SHIPPED | OUTPATIENT
Start: 2022-03-09 | End: 2023-03-16

## 2022-03-09 RX ORDER — TOPIRAMATE 100 MG/1
100 TABLET, FILM COATED ORAL DAILY
Qty: 90 TABLET | Refills: 3 | Status: SHIPPED | OUTPATIENT
Start: 2022-03-09 | End: 2023-04-10

## 2022-03-09 SDOH — ECONOMIC STABILITY: FOOD INSECURITY: WITHIN THE PAST 12 MONTHS, YOU WORRIED THAT YOUR FOOD WOULD RUN OUT BEFORE YOU GOT MONEY TO BUY MORE.: NEVER TRUE

## 2022-03-09 SDOH — ECONOMIC STABILITY: INCOME INSECURITY: HOW HARD IS IT FOR YOU TO PAY FOR THE VERY BASICS LIKE FOOD, HOUSING, MEDICAL CARE, AND HEATING?: NOT HARD AT ALL

## 2022-03-09 SDOH — ECONOMIC STABILITY: INCOME INSECURITY: IN THE LAST 12 MONTHS, WAS THERE A TIME WHEN YOU WERE NOT ABLE TO PAY THE MORTGAGE OR RENT ON TIME?: NO

## 2022-03-09 SDOH — HEALTH STABILITY: PHYSICAL HEALTH: ON AVERAGE, HOW MANY MINUTES DO YOU ENGAGE IN EXERCISE AT THIS LEVEL?: PATIENT DECLINED

## 2022-03-09 SDOH — ECONOMIC STABILITY: TRANSPORTATION INSECURITY
IN THE PAST 12 MONTHS, HAS LACK OF TRANSPORTATION KEPT YOU FROM MEETINGS, WORK, OR FROM GETTING THINGS NEEDED FOR DAILY LIVING?: NO

## 2022-03-09 SDOH — HEALTH STABILITY: PHYSICAL HEALTH
ON AVERAGE, HOW MANY DAYS PER WEEK DO YOU ENGAGE IN MODERATE TO STRENUOUS EXERCISE (LIKE A BRISK WALK)?: PATIENT DECLINED

## 2022-03-09 SDOH — ECONOMIC STABILITY: TRANSPORTATION INSECURITY
IN THE PAST 12 MONTHS, HAS THE LACK OF TRANSPORTATION KEPT YOU FROM MEDICAL APPOINTMENTS OR FROM GETTING MEDICATIONS?: NO

## 2022-03-09 SDOH — ECONOMIC STABILITY: FOOD INSECURITY: WITHIN THE PAST 12 MONTHS, THE FOOD YOU BOUGHT JUST DIDN'T LAST AND YOU DIDN'T HAVE MONEY TO GET MORE.: NEVER TRUE

## 2022-03-09 ASSESSMENT — ENCOUNTER SYMPTOMS
NAUSEA: 0
PARESTHESIAS: 0
HEMATURIA: 0
HEADACHES: 0
FEVER: 0
FREQUENCY: 0
COUGH: 0
DIZZINESS: 0
DYSURIA: 0
SORE THROAT: 0
CHILLS: 0
PALPITATIONS: 0
CONSTIPATION: 0
BREAST MASS: 0
WEAKNESS: 0
SHORTNESS OF BREATH: 0
JOINT SWELLING: 0
HEARTBURN: 0
DIARRHEA: 0
EYE PAIN: 0
NERVOUS/ANXIOUS: 0
HEMATOCHEZIA: 0
MYALGIAS: 0
ARTHRALGIAS: 0
ABDOMINAL PAIN: 0

## 2022-03-09 ASSESSMENT — LIFESTYLE VARIABLES
HOW OFTEN DO YOU HAVE A DRINK CONTAINING ALCOHOL: 2-4 TIMES A MONTH
HOW MANY STANDARD DRINKS CONTAINING ALCOHOL DO YOU HAVE ON A TYPICAL DAY: 1 OR 2
HOW OFTEN DO YOU HAVE SIX OR MORE DRINKS ON ONE OCCASION: NEVER

## 2022-03-09 ASSESSMENT — SOCIAL DETERMINANTS OF HEALTH (SDOH)
HOW OFTEN DO YOU ATTEND CHURCH OR RELIGIOUS SERVICES?: PATIENT DECLINED
DO YOU BELONG TO ANY CLUBS OR ORGANIZATIONS SUCH AS CHURCH GROUPS UNIONS, FRATERNAL OR ATHLETIC GROUPS, OR SCHOOL GROUPS?: NO
HOW OFTEN DO YOU GET TOGETHER WITH FRIENDS OR RELATIVES?: PATIENT DECLINED
IN A TYPICAL WEEK, HOW MANY TIMES DO YOU TALK ON THE PHONE WITH FAMILY, FRIENDS, OR NEIGHBORS?: THREE TIMES A WEEK

## 2022-03-09 NOTE — PROGRESS NOTES
SUBJECTIVE:   CC: Steffi Klein is an 66 year old woman who presents for preventive health visit.       Patient has been advised of split billing requirements and indicates understanding: Yes  Healthy Habits:     Getting at least 3 servings of Calcium per day:  NO    Bi-annual eye exam:  Yes    Dental care twice a year:  Yes    Sleep apnea or symptoms of sleep apnea:  Sleep apnea    Diet:  Other    Frequency of exercise:  1 day/week    Duration of exercise:  15-30 minutes    Taking medications regularly:  Yes    Medication side effects:  None    PHQ-2 Total Score: 0    Additional concerns today:  Yes    Today's PHQ-2 Score:   PHQ-2 (  [employer]) 3/9/2022   Q1: Little interest or pleasure in doing things 0   Q2: Feeling down, depressed or hopeless 0   PHQ-2 Score 0   PHQ-2 Total Score (12-17 Years)- Positive if 3 or more points; Administer PHQ-A if positive -   Q1: Little interest or pleasure in doing things Not at all   Q2: Feeling down, depressed or hopeless Not at all   PHQ-2 Score 0       Abuse: Current or Past (Physical, Sexual or Emotional) - No  Do you feel safe in your environment? Yes        Social History     Tobacco Use     Smoking status: Former Smoker     Packs/day: 0.50     Years: 7.00     Pack years: 3.50     Types: Cigarettes     Quit date: 1977     Years since quittin.2     Smokeless tobacco: Never Used     Tobacco comment: quit    Substance Use Topics     Alcohol use: Yes     Comment: socially         Alcohol Use 3/9/2022   Prescreen: >3 drinks/day or >7 drinks/week? No   Prescreen: >3 drinks/day or >7 drinks/week? -     Reviewed orders with patient.  Reviewed health maintenance and updated orders accordingly - Yes      Breast Cancer Screening:    FHS-7:   Breast CA Risk Assessment (FHS-7) 3/9/2022   Did any of your first-degree relatives have breast or ovarian cancer? Yes   Did any of your relatives have bilateral breast cancer? Unknown   Did any man in your family have  breast cancer? Unknown   Did any woman in your family have breast and ovarian cancer? Unknown   Did any woman in your family have breast cancer before age 50 y? Yes   Do you have 2 or more relatives with breast and/or ovarian cancer? Yes   Do you have 2 or more relatives with breast and/or bowel cancer? Yes         Pertinent mammograms are reviewed under the imaging tab.    History of abnormal Pap smear: NO - age 30-65 PAP every 5 years with negative HPV co-testing recommended  PAP / HPV Latest Ref Rng & Units 10/27/2020 7/13/2015 5/14/2007   PAP (Historical) - NIL NIL NIL   HPV16 NEG:Negative Negative Negative -   HPV18 NEG:Negative Negative Negative -   HRHPV NEG:Negative Negative Negative -     Reviewed and updated as needed this visit by clinical staff   Tobacco  Allergies  Meds   Med Hx  Surg Hx  Fam Hx  Soc Hx        Reviewed and updated as needed this visit by Provider     Meds             Past Medical History:   Diagnosis Date     Abnormal pulse oximetry 7/9/2018     Anxiety 12/28/2009     Bradycardia 7/9/2018     CARDIOVASCULAR SCREENING; LDL GOAL LESS THAN 160 10/31/2010     Chronic pain     left wrist     Distal radius fracture 1/28/2013     Esophageal reflux 7/2/2006     Hypotension, unspecified hypotension type 7/9/2018     Hypothyroidism 7/2/2014     Irritable bowel syndrome      Lumbago      Macrocytosis 4/22/2018     Neuropathy 6/30/2014    R foot post op      Non morbid obesity due to excess calories 7/18/2017     Obesity 7/13/2015     Onychomycosis 12/14/2017     JEROD (obstructive sleep apnea) 10/30/2018     Osteoporosis 2012     Reflux esophagitis      Restless legs syndrome (RLS)      Rosacea 7/13/2015     Rotator cuff syndrome, left 3/9/2022     Skin erosion 3/9/2022     Squamous cell carcinoma of shoulder, left      Wrist pain 1/28/2013       Past Surgical History:   Procedure Laterality Date     COLONOSCOPY  5/2007     [facility]       ESOPHAGOSCOPY, GASTROSCOPY, DUODENOSCOPY (EGD), COMBINED   10/24/2014     [facility]     ESOPHAGOSCOPY, GASTROSCOPY, DUODENOSCOPY (EGD), COMBINED N/A 10/24/2014    Procedure: COMBINED ESOPHAGOSCOPY, GASTROSCOPY, DUODENOSCOPY (EGD);  Surgeon: Black Alva MD;  Location: [facility]     HC REMOVAL DEEP IMPLANT Left 2018    [facility]     OPEN REDUCTION INTERNAL FIXATION WRIST  12/3/2012    Procedure: OPEN REDUCTION INTERNAL FIXATION WRIST;  Left Wrist Open Reduction Internal Fixation        ORTHOPEDIC SURGERY      right ankle surgery     skin cancer excision       TONSILLECTOMY  as a child      ZZC NONSPECIFIC PROCEDURE      3 NSVDs       Family History   Problem Relation Age of Onset     Cancer Father         lung and brain cancer,  at age of 69     Other Cancer Father         Inoperable Brain Cancer     Cancer Mother         lung cancer, surgically resected     Arthritis Mother         has had knee surgeries     Alzheimer Disease Mother      Thyroid Disease Mother      Obesity Mother      Cancer Paternal Aunt          from breast CA     Alzheimer Disease Maternal Grandfather      Diabetes Other         Type 1     Diabetes Daughter         Type 1     Breast Cancer Other         Aunt (my Dad's sister)     Breast Cancer Cousin      Breast Cancer Cousin      Breast Cancer Niece         Bracket 1 breast cancer     Colon Cancer Other          from colon cancer     Anxiety Disorder Daughter        Social History     Tobacco Use     Smoking status: Former Smoker     Packs/day: 0.50     Years: 7.00     Pack years: 3.50     Types: Cigarettes     Quit date: 1977     Years since quittin.2     Smokeless tobacco: Never Used     Tobacco comment: quit    Substance Use Topics     Alcohol use: Yes     Comment: socially         Review of Systems   Constitutional: Negative for chills and fever.   HENT: Negative for congestion, ear pain, hearing loss and sore throat.    Eyes: Negative for  "pain and visual disturbance.   Respiratory: Negative for cough and shortness of breath.    Cardiovascular: Negative for chest pain, palpitations and peripheral edema.   Gastrointestinal: Negative for abdominal pain, constipation, diarrhea, heartburn, hematochezia and nausea.   Breasts:  Negative for tenderness, breast mass and discharge.   Genitourinary: Negative for dysuria, frequency, genital sores, hematuria, pelvic pain, urgency, vaginal bleeding and vaginal discharge.   Musculoskeletal: Negative for arthralgias, joint swelling and myalgias.   Skin: Negative for rash.   Neurological: Negative for dizziness, weakness, headaches and paresthesias.   Psychiatric/Behavioral: Negative for mood changes. The patient is not nervous/anxious.           OBJECTIVE:   /70 (BP Location: Right arm, Patient Position: Chair, Cuff Size: Adult Regular)   Pulse 64   Temp 98.3  F (36.8  C) (Oral)   Resp 12   Ht 1.594 m (5' 2.75\")   Wt 69.4 kg (153 lb)   LMP 08/13/2007   SpO2 100%   BMI 27.32 kg/m    Physical Exam  Constitutional:       General: She is not in acute distress.     Appearance: She is well-developed.   HENT:      Right Ear: Tympanic membrane and external ear normal.      Left Ear: Tympanic membrane and external ear normal.      Nose: Nose normal.      Mouth/Throat:      Pharynx: No oropharyngeal exudate.   Eyes:      General:         Right eye: No discharge.         Left eye: No discharge.      Conjunctiva/sclera: Conjunctivae normal.      Pupils: Pupils are equal, round, and reactive to light.   Neck:      Thyroid: No thyromegaly.      Trachea: No tracheal deviation.   Cardiovascular:      Rate and Rhythm: Normal rate and regular rhythm.      Pulses: Normal pulses.      Heart sounds: Normal heart sounds, S1 normal and S2 normal. No murmur heard.    No friction rub. No S3 or S4 sounds.   Pulmonary:      Effort: Pulmonary effort is normal. No respiratory distress.      Breath sounds: Normal breath sounds. No " wheezing or rales.   Abdominal:      General: Bowel sounds are normal.      Palpations: Abdomen is soft. There is no mass.      Tenderness: There is no abdominal tenderness.   Musculoskeletal:         General: Normal range of motion.      Cervical back: Neck supple.   Lymphadenopathy:      Cervical: No cervical adenopathy.   Skin:     General: Skin is warm and dry.      Findings: No rash.      Comments: As described   Neurological:      Mental Status: She is alert and oriented to person, place, and time.      Motor: No abnormal muscle tone.      Deep Tendon Reflexes: Reflexes are normal and symmetric.   Psychiatric:         Thought Content: Thought content normal.         Judgment: Judgment normal.               ASSESSMENT/PLAN:     Problem List Items Addressed This Visit     RESOLVED: Encounter for preventive health examination - Primary     She has questions regarding current guidelines for cancer screening of: Breast and cervix.  Discussed at length.  She has nonfirst-degree relatives with BR AC positive.  Discussed         Hypothyroidism     Yearly TSH         Relevant Medications    levothyroxine (SYNTHROID/LEVOTHROID) 75 MCG tablet    Other Relevant Orders    TSH WITH FREE T4 REFLEX    Macrocytosis     Stable.  On B12.  Monitor.  Discussed differential         Relevant Medications    cyanocobalamin (VITAMIN B-12) 1000 MCG tablet    Other Relevant Orders    CBC with Platelets & Differential (Completed)    Vitamin B12    Neuropathy     Symptoms extremely well controlled with topiramate.  Continue indefinitely         Relevant Medications    topiramate (TOPAMAX) 100 MG tablet    Other Relevant Orders    COMPREHENSIVE METABOLIC PANEL    Vitamin B12    Rosacea     Slight nasal erythema otherwise appears well controlled.  Continue current therapies         Relevant Medications    doxycycline monohydrate (ADOXA) 100 MG tablet    Rotator cuff syndrome, left     She developed left upper arm pain when she got her new  "rowing machine.  Now resolved.  Full range of motion.  Pain with internal rotation against resistance.  Discussed rotator cuff, interventions, modification of exercise         Skin erosion     Follow-up abrasion on each cheek.  These appear to be actinic.  She has a dermatologist whom she will contact         Tinnitus, bilateral     Exacerbated by stress.  Exam unrevealing.  No subjective hearing loss.  Discussed evaluation.  She is interested in finding out if she is a candidate for therapies           Other Visit Diagnoses     Visit for screening mammogram        Relevant Orders    MA SCREENING DIGITAL BILAT - Future  (s+30)    Otolaryngology Referral    Screen for colon cancer        Relevant Orders    Fecal colorectal cancer screen FIT - Future (S+30)          Patient has been advised of split billing requirements and indicates understanding: Yes    COUNSELING:  Reviewed preventive health counseling, as reflected in patient instructions    Estimated body mass index is 27.32 kg/m  as calculated from the following:    Height as of this encounter: 1.594 m (5' 2.75\").    Weight as of this encounter: 69.4 kg (153 lb).    Weight management plan: She has lost 50 pounds    She reports that she quit smoking about 44 years ago. Her smoking use included cigarettes. She has a 3.50 pack-year smoking history. She has never used smokeless tobacco.      Counseling Resources:  ATP IV Guidelines  Pooled Cohorts Equation Calculator  Breast Cancer Risk Calculator  BRCA-Related Cancer Risk Assessment: FHS-7 Tool  FRAX Risk Assessment  ICSI Preventive Guidelines  Dietary Guidelines for Americans, 2010  USDA's MyPlate  ASA Prophylaxis  Lung CA Screening    Raul Newman MD  [facility]"

## 2022-03-09 NOTE — ASSESSMENT & PLAN NOTE
She has questions regarding current guidelines for cancer screening of: Breast and cervix.  Discussed at length.  She has nonfirst-degree relatives with BR AC positive.  Discussed

## 2022-03-09 NOTE — ASSESSMENT & PLAN NOTE
Follow-up abrasion on each cheek.  These appear to be actinic.  She has a dermatologist whom she will contact

## 2022-03-09 NOTE — ASSESSMENT & PLAN NOTE
Exacerbated by stress.  Exam unrevealing.  No subjective hearing loss.  Discussed evaluation.  She is interested in finding out if she is a candidate for therapies

## 2022-03-10 ENCOUNTER — MYC MEDICAL ADVICE (OUTPATIENT)
Dept: FAMILY MEDICINE | Facility: CLINIC | Age: 67
End: 2022-03-10
Payer: COMMERCIAL

## 2022-03-10 LAB
ALBUMIN SERPL-MCNC: 3.6 G/DL (ref 3.4–5)
ALP SERPL-CCNC: 53 U/L (ref 40–150)
ALT SERPL W P-5'-P-CCNC: 32 U/L (ref 0–50)
ANION GAP SERPL CALCULATED.3IONS-SCNC: 6 MMOL/L (ref 3–14)
AST SERPL W P-5'-P-CCNC: 21 U/L (ref 0–45)
BILIRUB SERPL-MCNC: 0.4 MG/DL (ref 0.2–1.3)
BUN SERPL-MCNC: 20 MG/DL (ref 7–30)
CALCIUM SERPL-MCNC: 9.1 MG/DL (ref 8.5–10.1)
CHLORIDE BLD-SCNC: 108 MMOL/L (ref 94–109)
CO2 SERPL-SCNC: 26 MMOL/L (ref 20–32)
CREAT SERPL-MCNC: 0.86 MG/DL (ref 0.52–1.04)
GFR SERPL CREATININE-BSD FRML MDRD: 75 ML/MIN/1.73M2
GLUCOSE BLD-MCNC: 89 MG/DL (ref 70–99)
HEMOCCULT STL QL IA: NEGATIVE
POTASSIUM BLD-SCNC: 4 MMOL/L (ref 3.4–5.3)
PROT SERPL-MCNC: 7.1 G/DL (ref 6.8–8.8)
SODIUM SERPL-SCNC: 140 MMOL/L (ref 133–144)
TSH SERPL DL<=0.005 MIU/L-ACNC: 1.4 MU/L (ref 0.4–4)

## 2022-03-10 PROCEDURE — 82274 ASSAY TEST FOR BLOOD FECAL: CPT | Performed by: FAMILY MEDICINE

## 2022-04-11 ENCOUNTER — ANCILLARY PROCEDURE (OUTPATIENT)
Dept: MAMMOGRAPHY | Facility: CLINIC | Age: 67
End: 2022-04-11
Attending: FAMILY MEDICINE
Payer: COMMERCIAL

## 2022-04-11 DIAGNOSIS — Z12.31 VISIT FOR SCREENING MAMMOGRAM: ICD-10-CM

## 2022-04-11 PROCEDURE — 77067 SCR MAMMO BI INCL CAD: CPT | Mod: TC | Performed by: RADIOLOGY

## 2022-04-11 PROCEDURE — 77063 BREAST TOMOSYNTHESIS BI: CPT | Mod: TC | Performed by: RADIOLOGY

## 2022-09-28 ENCOUNTER — OFFICE VISIT (OUTPATIENT)
Dept: PODIATRY | Facility: CLINIC | Age: 67
End: 2022-09-28
Payer: COMMERCIAL

## 2022-09-28 VITALS — SYSTOLIC BLOOD PRESSURE: 112 MMHG | WEIGHT: 146 LBS | DIASTOLIC BLOOD PRESSURE: 72 MMHG | BODY MASS INDEX: 26.07 KG/M2

## 2022-09-28 DIAGNOSIS — M20.5X1 HALLUX LIMITUS OF RIGHT FOOT: ICD-10-CM

## 2022-09-28 DIAGNOSIS — M20.41 HAMMERTOE OF RIGHT FOOT: ICD-10-CM

## 2022-09-28 DIAGNOSIS — M79.671 RIGHT FOOT PAIN: Primary | ICD-10-CM

## 2022-09-28 DIAGNOSIS — G57.91 NEUROPATHY OF RIGHT FOOT: ICD-10-CM

## 2022-09-28 DIAGNOSIS — M20.42 HAMMERTOE OF LEFT FOOT: ICD-10-CM

## 2022-09-28 PROCEDURE — 99204 OFFICE O/P NEW MOD 45 MIN: CPT | Performed by: PODIATRIST

## 2022-09-28 NOTE — PATIENT INSTRUCTIONS
Thank you for choosing [facility] Podiatry / Foot & Ankle Surgery!    DR OROZCO'S CLINIC:  [facility]   [address]   [place] [address]      TRIAGE LINE: [phone]  APPOINTMENTS: [phone]  RADIOLOGY: [phone]  SET UP SURGERY: [phone]  FAX NUMBER: [phone]  BILLING QUESTIONS: [phone]       Follow up: As needed    New Balance insert with Metarsal Pad    Toe Trainer- Right  Toe Splint- Left      GETTING READY FOR YOUR SURGERY  ONE-THREE WEEKS BEFORE  1. See your Family Doctor or Primary Care Doctor for a History and Physical. If you do not, we may need to change the date of your surgery.  2. Please see pre-surgical medications below to which medications need to be stopped before surgery and when.    TEN OR MORE DAYS BEFORE    1. [place] with the hospital. (For [facility])      By Phone: [phone].      By Internet: [email or web address]. Choose [facility].      If you do not register by phone or online, we will call to help you register.    SAME DAY SURGERY PATIENTS  1. You will need a family member of friend to drive you home. If you do not have one the surgery will be cancelled or rescheduled.  2. You will need a responsible adult to stay with you that night after the surgery.       We will ask this person to listen to some instructions before you leave the hospital.  * If your child is having surgery, and you would like a tour of the hospital, please call: [phone].      DAY BEFORE SURGERY  1. DO NOT EAT OR DRINK ANYTHING AFTER MIDNIGHT THE NIGHT BEFORE YOUR SURGERY!   2. DO NOT DRINK ALCOHOL.  3. Do not take over the counter drugs.  4. Some people need to have blood tests at the hospital. If you need blood tests, we will tell you in advance.  5. Take medications as directed by your doctor. You may take these with a small amount of water.  6. Do not chew gum, chew tobacco, or suck on hard candy the day of surgery.  7. Bring your  insurance cards, a list of your medicines and co-pays you might need. Leave jewelry and other valuables at home.  8. If you received papers at your doctor's office, bring these with you to the surgery.    If you have questions about these instructions, please call: [phone]  Ask to speak with a pre-admitting nurse.    PRESURGICAL MEDICATIONS:  Certain prescription, over-the-counter, and herbal medications interfere with healing after an operation. The main concern relates to medications that increase bleeding at the surgical site. Excess blood under the incision results in poor wound healing, excess pain, increased scarring, and a higher risk of infection.    Some medications slow the healing process of bone. Medications can also interfere with the anesthesia drugs that keep you asleep during the operation. It is important to ensure that these medications are out of your system prior to the operation. The list below details a number of medications that are of concern. Pay special attention to how long you should avoid these medications before your operation. Please note that this list is not complete. You should ask your surgeon or pharmacist if you are uncertain about other medications. Any herbal supplement not listed should be discontinued at least one week prior to surgery.    Aspirin: Hold for one week prior to surgery and restart the day after surgery. This over the counter medication promotes bleeding.    Motrin / Ibuprofen / Aleve / Advil / NSAIDS:  Stop one week prior to surgery. These medications affect bleeding and may cause delay in bone healing. Avoid taking these medications for six weeks after bone surgeries. Other procedures may allow you to restart sooner than 6 weeks after surgery.    Coumadin / Plavix: Your primary care provider will manage Coumadin in relation to surgery. Coumadin may result in excessive bleeding and may be adjusted before and after surgery.    Naomie: Stop two weeks prior  to surgery and restart two weeks after surgery. This medication can effect soft tissue healing and increases the risk of infection.    Remicade: Stop 8-12 weeks before surgery and restart two weeks after surgery. This medication can affect soft tissue healing and increases the risk of infection.    Humira: Stop 4 weeks before surgery and restart two weeks after surgery. This medication can affect soft tissue healing and increases the risk of infection.    Methotrexate: Stop one dose prior to surgery. This medication will be restarted when the wound appears to be healing well. Please ask your surgeon about restarting this medication when you are being seen in the office for wound checks.    Kava: Stop at least one day prior to surgery and may restart one day after surgery. Kava may increase the sedative effect of anesthetics that are given during the operation. Kava can also increase bleeding at the surgical site.    Ephedra (ma woodson): Stop at least one day prior to surgery and may restart one day after surgery.  Ephedra may increase the risk of heart attack and stroke. This medication can also increase bleeding at the surgical site.    [place]'s Wort: Stop at least five days before surgery and may restart one day after surgery. [place]'s wort may diminish the effects of several drugs that are given during surgery.    Ginseng: Stop at least one week prior to surgery and may restart one day after surgery.  Ginseng lowers blood sugar and may increase bleeding at the surgical site.    Ginkgo: Stop 36 hours before surgery and may restart one day after surgery. Ginkgo may increase bleeding at the surgical site.    Garlic: Stop at least one week prior to surgery and may restart one day after. Garlic may increase bleeding at the surgery site.    Valerian: Do a slow steady decrease in your daily dose over a period of 2-3 weeks before surgery to decrease the chance of withdrawal symptoms. Valerian may increase the sedative  effect of anesthetics given during the operation.    Echinacea: There is no data on stopping echinacea prior to surgery. This medication though can be associated with allergic reactions and suppression of your immune system.    Vitamin E, Omega-3, Flax, Fish Oil, Glucosamine and Chondroitin: Stop 2 weeks prior to surgery and may restart one day after. These herbal medications can increase risk of bleeding at surgical site.  POST OPERATIVE HOME CARE INSTRUCTIONS  Activities: You should rest today. Stay off your feet as much as possible and keep your foot elevated above the level of your heart (about two pillow height). Wear your surgical shoe at all times when up. Limit walking to 5 to 10 minutes per hour over the next few days if your doctor has previously told you that you can put some weight on the foot after surgery, although limit the weight to your heel. If you are supposed to be non-weight bearing, that means NO WEIGHT AT ALL ON THE FOOT. Use an ice pack on the ankle while awake 20 minutes per hour to help decrease pain and swelling.     Discomfort: If a prescription for pain was given, take as directed. If no pain medication was ordered, you may take a non-prescription, non-aspirin pain medication. If the pain is not relieved by pain medication, call the clinic.     Incision and Dressing: Your surgical dressing is a sterile dressing and should be left in place until removed by your physician. Keep the dressing dry by covering it with a plastic bag for showers, taking baths with the surgical foot out of the tub, or sponge bathing. Some bleeding on the dressing should be expected. If however, you notice active or excessive bleeding or a temperature over 100 degrees by mouth, call the clinic. Do not change dressing by yourself.  If the dressing becomes wet or dirty, please call the clinic as it may need a new sterile dressing applied. You may start getting the foot wet after the stitches are removed.     Do not  wear regular shoes with a surgical bandage and/or external pins in your foot. Wear loose fitting clothing that easily will slip over the bandage and/or pins. Do not cover your surgical foot with blankets as they may damage the dressing/pins. Also, remember that dogs are not aware of your surgery. Please keep them away from the bandage/pins.   If your surgeon places external pins in your foot, you must keep the foot dry until the pins are removed at 6-8 weeks after the surgery. Pins should be covered with a dressing for protection. You should examine the pins and your skin often. Check for any spreading redness or yellow drainage from the pin areas. Do not apply ointment around the pins. Do not push a loose pin back into your foot. Please call the clinic if the pin is spinning or moving in and out. If the pins are bumped or loosened they may need to be removed early. This may affect your surgical outcome.   Please call the clinic if you feel there is a problem with your pins and/or surgical bandage.    TIPS FOR SUCCESSFUL HEALING  How you care for the surgical site is critically important to achieve a successful result after surgery. Avoidance of injury, infection, excess swelling, scar tissue and stiffness are highly dependent on the care you provide over the next six weeks. Please do not hesitate to call if you have questions or concerns.   Your foot requires significant rest and elevation. Sitting for long hours with your foot elevated, however, will create its own problems. Expect muscle aches, back pain, cramps, etc. Optimal posture, lumbar support, back exercises, ice and heat may all help with your new aches and pains. Do not apply a heating pad to your foot or leg as this can cause increase swelling and pain. Rather use ice in those areas.   Pain medications cause drowsiness. You may frequently sleep during the day and then have trouble sleeping at night. Over the counter sleep aids might be more effective  than narcotic pain medication to achieve a reasonable night's sleep.    Narcotic pain medications and inactivity lead to constipation. Limiting use of narcotics will help minimize this problem. The pain medications will not completely alleviate your pain. The purpose of pain pills is to take the edge off and help you get through the first few days. You can substitute Extra Strength Tylenol if pain is mild. Please note that narcotic pain pills usually contain acetaminophen (the active ingredient in Tylenol) so be careful to avoid the maximum dose of acetaminophen. You should take measures to avoid constipation by drinking plenty of water, eating lots of fruit and vegetables and taking the recommended dose of Metamucil or a similar fiber supplement. These measures should be continued for as long as you require narcotic pain medications and are inactive.     Showering is a major challenge. Your incision requires about three days to become sealed from water. Your bandage should not get wet and should not be removed. Do not attempt showering for the first three days. A sponge bath is preferred. You may attempt to shower on the fourth day after the operation. Your foot should be covered with a bag, tape and rubber bands. Double bagging is preferred. Standing in the shower with a bag on your foot is quite hazardous. A portable shower stool would be ideal. The bandage will need to be changed in the office if it becomes moistened. A moist bandage will not dry on its own. A moist dressing may lead to infection.   Stiffness will develop after any operation due to scarring. The scar tissue begins to form immediately after the surgery. Inactivity can cause excess stiffness and may lead to blood clots in your legs. Frequent range of motion exercises will help decrease stiffness, blood clots, scar tissue and adhesions. Please call if you are unsure about these recommendations.   Good luck and best wishes on a prompt recovery.  Healing is slow but an important step in your recovery. You are in control of the final result. Please use this time wisely. Please do not hesitate to call if you have questions, concerns or comments.    * If you have any post-operative questions or concerns regarding your procedure, call our triage team at the [facility] at [phone] (option 3). GETTING READY FOR YOUR SURGERY  ONE-THREE WEEKS BEFORE  1. See your Family Doctor or Primary Care Doctor for a History and Physical. If you do not, we may need to change the date of your surgery.  2. Please see pre-surgical medications below to which medications need to be stopped before surgery and when.    TEN OR MORE DAYS BEFORE    1. [place] with the hospital. (For [facility])      By Phone: [phone].      By Internet: [email or web address]. Choose [facility].      If you do not register by phone or online, we will call to help you register.    SAME DAY SURGERY PATIENTS  1. You will need a family member of friend to drive you home. If you do not have one the surgery will be cancelled or rescheduled.  2. You will need a responsible adult to stay with you that night after the surgery.       We will ask this person to listen to some instructions before you leave the hospital.  * If your child is having surgery, and you would like a tour of the hospital, please call: [phone].      DAY BEFORE SURGERY  1. DO NOT EAT OR DRINK ANYTHING AFTER MIDNIGHT THE NIGHT BEFORE YOUR SURGERY!   2. DO NOT DRINK ALCOHOL.  3. Do not take over the counter drugs.  4. Some people need to have blood tests at the hospital. If you need blood tests, we will tell you in advance.  5. Take medications as directed by your doctor. You may take these with a small amount of water.  6. Do not chew gum, chew tobacco, or suck on hard candy the day of surgery.  7. Bring your insurance cards, a list of your medicines and co-pays you might need. Leave  jewelry and other valuables at home.  8. If you received papers at your doctor's office, bring these with you to the surgery.    If you have questions about these instructions, please call: [phone]  Ask to speak with a pre-admitting nurse.    PRESURGICAL MEDICATIONS:  Certain prescription, over-the-counter, and herbal medications interfere with healing after an operation. The main concern relates to medications that increase bleeding at the surgical site. Excess blood under the incision results in poor wound healing, excess pain, increased scarring, and a higher risk of infection.    Some medications slow the healing process of bone. Medications can also interfere with the anesthesia drugs that keep you asleep during the operation. It is important to ensure that these medications are out of your system prior to the operation. The list below details a number of medications that are of concern. Pay special attention to how long you should avoid these medications before your operation. Please note that this list is not complete. You should ask your surgeon or pharmacist if you are uncertain about other medications. Any herbal supplement not listed should be discontinued at least one week prior to surgery.    Aspirin: Hold for one week prior to surgery and restart the day after surgery. This over the counter medication promotes bleeding.    Motrin / Ibuprofen / Aleve / Advil / NSAIDS:  Stop one week prior to surgery. These medications affect bleeding and may cause delay in bone healing. Avoid taking these medications for six weeks after bone surgeries. Other procedures may allow you to restart sooner than 6 weeks after surgery.    Coumadin / Plavix: Your primary care provider will manage Coumadin in relation to surgery. Coumadin may result in excessive bleeding and may be adjusted before and after surgery.    Enbriel: Stop two weeks prior to surgery and restart two weeks after surgery. This medication can effect  soft tissue healing and increases the risk of infection.    Remicade: Stop 8-12 weeks before surgery and restart two weeks after surgery. This medication can affect soft tissue healing and increases the risk of infection.    Humira: Stop 4 weeks before surgery and restart two weeks after surgery. This medication can affect soft tissue healing and increases the risk of infection.    Methotrexate: Stop one dose prior to surgery. This medication will be restarted when the wound appears to be healing well. Please ask your surgeon about restarting this medication when you are being seen in the office for wound checks.    Kava: Stop at least one day prior to surgery and may restart one day after surgery. Kava may increase the sedative effect of anesthetics that are given during the operation. Kava can also increase bleeding at the surgical site.    Ephedra (ma woodson): Stop at least one day prior to surgery and may restart one day after surgery.  Ephedra may increase the risk of heart attack and stroke. This medication can also increase bleeding at the surgical site.    [place]'s Wort: Stop at least five days before surgery and may restart one day after surgery. Federico's wort may diminish the effects of several drugs that are given during surgery.    Ginseng: Stop at least one week prior to surgery and may restart one day after surgery.  Ginseng lowers blood sugar and may increase bleeding at the surgical site.    Ginkgo: Stop 36 hours before surgery and may restart one day after surgery. Ginkgo may increase bleeding at the surgical site.    Garlic: Stop at least one week prior to surgery and may restart one day after. Garlic may increase bleeding at the surgery site.    Valerian: Do a slow steady decrease in your daily dose over a period of 2-3 weeks before surgery to decrease the chance of withdrawal symptoms. Valerian may increase the sedative effect of anesthetics given during the operation.    Echinacea: There is  no data on stopping echinacea prior to surgery. This medication though can be associated with allergic reactions and suppression of your immune system.    Vitamin E, Omega-3, Flax, Fish Oil, Glucosamine and Chondroitin: Stop 2 weeks prior to surgery and may restart one day after. These herbal medications can increase risk of bleeding at surgical site.     POTENTIAL COMPLICATIONS OF FOOT & ANKLE SURGERY  Undergoing a surgical procedure involves a certain amount of risk. Risks of complications vary depending on the complexity of the surgery and how you take care of the surgical area during the healing process. Complications can range from minor infection to death. Some complications are temporary while others will be permanent.  Your surgeon weighs the risk of complications vs the potential benefit of undergoing surgery. You need to consider your tolerance for unexpected problems as you decide whether to undergo surgery.    Foot and Ankle surgery involves cutting skin, bone, ligaments, blood vessels and joints.  These structures heal well but not without consequence. Any break to the skin can lead to infection. A deep infection involves bones or joints which can be devastating. Deep infection can lead to amputation or could spread to other parts of your body. Most infections are minor and easily treated with oral antibiotics. Infections are often times from bacteria already present on your skin. Proper care of the surgical site is an essential component of avoiding infection. Do not get the bandage wet and take proper care of external pins to avoid these problems.     Joint stiffness is inherent to any foot or ankle surgery. Joint surgery is a major component of reconstructive foot and ankle procedures. The ligaments and tissues around the joint are cut, and later repaired. Scare tissue limits joint mobility. This can be permanent but generally improves over the course of one year.    Surgery involves dissection  around nerves. Visible nerves are moved out of the way while very small nerves are cut. Scar tissue develops around nerves and can lead to nerve pain, numbness, or neuromas. Nerve symptoms can be permanent. This can lead to numbness or sometimes hypersensitivity to touch and problems wearing shoes.    Bones do not always heal after surgery. Poor healing after a bone cut or joint fusion can lead to an extended period of casting or repeat surgery. Electronic bone stimulators are sometimes used to stimulate poor healing of bone. Nonunion is when joint fusion does not take.  This can occur as often as 10% of the time. Smoking doubles your risk of poor bone healing to 20%.    Bone grafting is sometimes necessary during the original or subsequent surgery. Bone is sometimes taken from other parts of your body or freeze dried bone from a bone bank from a bone bank or synthetic bone material might be used.    A scar is always present after foot and ankle surgery. The scar will be visible and could be sensitive. Some people develop excessive scarring, which cannot be controlled by the surgeon. Scars can be unsightly and can restrict joint mobility.    Blood clots can develop in the calf after surgery. Foot and ankle surgery is a predisposing factor for blood clots. The blood clot could break and travel to your lung.  This condition can lead to death. Early warning signs could include calf swelling and pain, chest pain or shortness of breath. This is an emergency that requires immediate attention by a medical doctor!    Surgery will not necessarily create a pain-free foot. Even normal feet hurt. Crooked toes, bunions, neuromas, flat feet and arthritis should all be considered permanent conditions.  Ankle pain commonly requires multiple surgeries over a lifetime. Do not assume that having surgery will permanently fix your condition. You may need permanent alteration in shoes and activities to accommodate your foot and ankle  problem.    Careful attention to post-operative recommendations will dramatically reduce your risk of complications. Proper dressing, wound care, elevation and rest will be essential to get the wound healed and minimize scarring. Strict attention to activity restrictions, such as non-weight bearing, or partial weight bearing is essential. Internal fixation devices may not resist the stress of walking. Some select surgeries allow the patient to walk, however this should be very minimal.    Despite these concerns, foot and ankle surgery leads to a high level of patient satisfaction. Your surgeon would not recommend surgery if he/she did not expect your foot to improve. Talk to your surgeon about any of the above issues.    POST OPERATIVE HOME CARE INSTRUCTIONS  Activities: You should rest today. Stay off your feet as much as possible and keep your foot elevated above the level of your heart (about two pillow height). Wear your surgical shoe at all times when up. Limit walking to 5 to 10 minutes per hour over the next few days if your doctor has previously told you that you can put some weight on the foot after surgery, although limit the weight to your heel. If you are supposed to be non-weight bearing, that means NO WEIGHT AT ALL ON THE FOOT. Use an ice pack on the ankle while awake 20 minutes per hour to help decrease pain and swelling.     Discomfort: If a prescription for pain was given, take as directed. If no pain medication was ordered, you may take a non-prescription, non-aspirin pain medication. If the pain is not relieved by pain medication, call the clinic.     Incision and Dressing: Your surgical dressing is a sterile dressing and should be left in place until removed by your physician. Keep the dressing dry by covering it with a plastic bag for showers, taking baths with the surgical foot out of the tub, or sponge bathing. Some bleeding on the dressing should be expected. If however, you notice active or  excessive bleeding or a temperature over 100 degrees by mouth, call the clinic. Do not change dressing by yourself.  If the dressing becomes wet or dirty, please call the clinic as it may need a new sterile dressing applied. You may start getting the foot wet after the stitches are removed.     Do not wear regular shoes with a surgical bandage and/or external pins in your foot. Wear loose fitting clothing that easily will slip over the bandage and/or pins. Do not cover your surgical foot with blankets as they may damage the dressing/pins. Also, remember that dogs are not aware of your surgery. Please keep them away from the bandage/pins.   If your surgeon places external pins in your foot, you must keep the foot dry until the pins are removed at 6-8 weeks after the surgery. Pins should be covered with a dressing for protection. You should examine the pins and your skin often. Check for any spreading redness or yellow drainage from the pin areas. Do not apply ointment around the pins. Do not push a loose pin back into your foot. Please call the clinic if the pin is spinning or moving in and out. If the pins are bumped or loosened they may need to be removed early. This may affect your surgical outcome.   Please call the clinic if you feel there is a problem with your pins and/or surgical bandage.    TIPS FOR SUCCESSFUL HEALING  How you care for the surgical site is critically important to achieve a successful result after surgery. Avoidance of injury, infection, excess swelling, scar tissue and stiffness are highly dependent on the care you provide over the next six weeks. Please do not hesitate to call if you have questions or concerns.   Your foot requires significant rest and elevation. Sitting for long hours with your foot elevated, however, will create its own problems. Expect muscle aches, back pain, cramps, etc. Optimal posture, lumbar support, back exercises, ice and heat may all help with your new aches and  pains. Do not apply a heating pad to your foot or leg as this can cause increase swelling and pain. Rather use ice in those areas.   Pain medications cause drowsiness. You may frequently sleep during the day and then have trouble sleeping at night. Over the counter sleep aids might be more effective than narcotic pain medication to achieve a reasonable night's sleep.    Narcotic pain medications and inactivity lead to constipation. Limiting use of narcotics will help minimize this problem. The pain medications will not completely alleviate your pain. The purpose of pain pills is to take the edge off and help you get through the first few days. You can substitute Extra Strength Tylenol if pain is mild. Please note that narcotic pain pills usually contain acetaminophen (the active ingredient in Tylenol) so be careful to avoid the maximum dose of acetaminophen. You should take measures to avoid constipation by drinking plenty of water, eating lots of fruit and vegetables and taking the recommended dose of Metamucil or a similar fiber supplement. These measures should be continued for as long as you require narcotic pain medications and are inactive.     Showering is a major challenge. Your incision requires about three days to become sealed from water. Your bandage should not get wet and should not be removed. Do not attempt showering for the first three days. A sponge bath is preferred. You may attempt to shower on the fourth day after the operation. Your foot should be covered with a bag, tape and rubber bands. Double bagging is preferred. Standing in the shower with a bag on your foot is quite hazardous. A portable shower stool would be ideal. The bandage will need to be changed in the office if it becomes moistened. A moist bandage will not dry on its own. A moist dressing may lead to infection.   Stiffness will develop after any operation due to scarring. The scar tissue begins to form immediately after the  surgery. Inactivity can cause excess stiffness and may lead to blood clots in your legs. Frequent range of motion exercises will help decrease stiffness, blood clots, scar tissue and adhesions. Please call if you are unsure about these recommendations.   Good luck and best wishes on a prompt recovery. Healing is slow but an important step in your recovery. You are in control of the final result. Please use this time wisely. Please do not hesitate to call if you have questions, concerns or comments.    * If you have any post-operative questions or concerns regarding your procedure, call our triage team at the [facility] at [phone] (option 4).   OVER THE COUNTER INSERTS  SuperFeet   Sofsole Fit Spenco   Power Step   Walk-Fit Arch Cradles     Most of these can be found at your local [employer], [employer], or online.  **A good high quality over the counter insert should cost around $40-$50 **    [employer] SHOES LOCATIONS  [address]  [phone]   [address]  [phone] [place]  [address] [facility]  [phone]   [place]  [address]  [phone]   [place]  [address] [facility]  [phone] [place] [address]  [phone]   [place]  [address] [facility]  [phone]   [place]  [address]  [phone] [place]  [address]  [phone]             [email or web address] or call [email or web address]  HAMMERTOE PADS / TOE SPLINTS   [email or web address] or call [email or web address]    BUNION (HALLUX ABDUCTO VALGUS)  A bunion is caused by muscle imbalance. The great toe is pulled toward the smaller toes. The metatarsal head is pushed outward creating a lump on the side of your foot. Imbalance is the result of foot structure and instability.   Bunions do not improve with time. They usually enlarge, however this is a fairly slow process. Shoes do not necessarily cause bunions, however, they can  hasten development and definitely cause bunions to hurt.   Bunions often run in families. We inherit a certain foot structure, which may be predisposed to bunion development.   Bunion pain is likely a combination of shoes rubbing on the bump, nerve irritation, compression between the toes, joint misalignment, arthritis and altered gait.   SYMPTOMS   Bunions are usually termed mild, moderate or severe. Just because you have a bunion does not mean you have to have pain. There are some people with very severe bunions and no pain and people with mild bunions and a lot of pain.   - Pain on the inside of your foot at the big toe joint (1st MTPJ)   - Swelling on the inside of your foot at the big toe joint   - Redness on the inside of your foot at the big toe joint   - Numbness or burning in the big toe (hallux)   - Decreased motion at the big toe joint   - Painful bursa (fluid-filled sac) on the inside of your foot at the big toe joint   - Pain while wearing shoes -especially shoes too narrow or with high heels    - Pain during activities   - Corn in between the big toe and second toe   - Callous formation on the side or bottom of the big toe or big toe joint   - Callous under the second toe joint (2nd MTPJ)   - Pain in the second toe joint   TREATMENT  Conservative (non-surgical) treatment will not make the bunion go away, but it will hopefully decrease the signs and symptoms you have and help you get rid of the pain and get you back to your activities.   1.  Wider shoes or extra depth shoes: Most bunion pain can be improved simply by wearing compatible shoes. People with bunions cannot choose footwear simply because they like the style. Your bunion should determine which shoes are to be worn. Wide shoes with nonirritating seams,soft leather and a square toe box are most compatible with a bunion. Shoes should fit appropriately right out of the box but may need to be professionally stretched and modified to accommodate  the bump. Heels, dress shoes and shoes with pointed toes will not be comfortable.   2. NSAIDs   3.  Arch supports, custom inserts, padding, splints, toe spacers : Most bunion pain can be improved simply by wearing compatible shoes. People with bunions cannot choose footwear simply because they like the style. Your bunion should determine which shoes are to be worn. Wide shoes with nonirritating seams,soft leather and a square toe box are most compatible with a bunion. Shoes should fit appropriately right out of the box but may need to be professionally stretched and modified to accommodate the bump. Heels, dress shoes and shoes with pointed toes will not be comfortable.   4.  Change activities   5.  Physical therapy  SURGERY  Surgical treatment for bunions is sometimes needed. If you are limited by pain, cannot fit in shoes comfortably and are not able to do your daily activities then surgery may be a good option for you. There are many different surgical procedures to repair bunions. Your foot and ankle surgeon will review your foot exam findings, your x-rays, your age, your health, your lifestyle, your physical activity level and discuss with you which procedure he or she would recommend. Surgical procedures for bunions range from soft tissue repair to cutting and realigning the bones. It is not recommended that you have bunion surgery for cosmetic reasons (you do not like how your foot looks) or because you want to fit in a certain pair of shoes; There is the risk that even after surgery, the bunion will reoccur 9-10% of the time.   Bunion surgery involves cutting and repositioning the bones surrounding the bunion. Pins and screws are used to hold the bones in place during the healing process. The goal of bunion surgery is to reduce the size of the bunion bump. Realignment of the toe and joint is attempted.     Some first toes cannot be forced back into normal alignment even with surgery. Surgery is helpful in  most cases but does not necessarily create a normal foot.   Healing after surgery requires about six weeks of protection. This allows the bone to heal. Maximum recovery takes about one year. The scar tissue and jOint structures require this amount of time to finish the healing process. Expect stiffness, swelling and numbness during that time frame. Bunion surgery does involve side effects. Some side effects are predictable and others are less common but do occur. A scar will be visible and could be irritated by shoes. The shoe may rub on the screw or internal pin requiring surgical removal of these fixation devices. The screw and pin would likely be left in place for a full year. The first toe may loose motion after bunion surgery. The amount of stiffness is variable. Some people never regain normal motion of the first toe. This is due to scar tissue inherent to any surgery. The first toe may drift toward the second toe or away from the second toe. Spreading of the first and second toes is a rare occurrence after bunion surgery. This can be quite bothersome and would need to be surgically repaired. Toe drift toward the second toe could result in a recurrent bunion and revision surgery. Joint fusion is one option to correct an unstable, drifting toe. This procedure straightens the toe, however, no motion remains. Fusion may be necessary to correct complications of bunion surgery or as the original procedure in severe cases.   All surgical procedures involve risk of infection, numbness, pain, delayed healing, osteotomy dislocation, blood clots, continued foot pain, etc. Bunion surgery is quite complex and should not be taken lightly.   Any skin incision can lead to infection. Deep infection might involve the bone and thus repeat surgery and six weeks of IV antibiotics. Scar tissue can cause nerve pain or numbness. This is generally temporary but can be permanent. We do not have treatments that cure nerve problems.  Second toe pain could be related to altered mechanics and pressure transferred to the second toe. Most feet with bunions have pre-existing second toe problems. Delayed bone healing would lengthen the healing time. Some bones simply do not heal. This requires repeat surgery, electronic bone stimulation and/or extended protection. Smokers have an approximate 20% chance of poor bone healing. This is double that of a non-smoker. The bone cut may displace. This may need to be repaired with a second operation. Displacement can cause jOint malalignment. Immobility after surgery can cause blood clots in the legs and lungs. This could result in death.   Foot pain is complex. Most feet hurt for more than one reason. Fixing the bunion would not necessarily create a pain free foot. Appropriate shoes, healthy body weight, avoidance of bare foot walking and moderation of activity will always be necessary to enjoy foot comfort. Your bunion may involve arthritis, which is incurable even with surgery. Long standing bunions often involve chronic irritation to the surrounding nerves. Nerve pain may not resolve even with reducing the bunion bump since permanent nerve damage may be present   Bunion surgery is nevertheless quite successful. Most surgical patients are pleased with their foot following bunion surgery. Many of the issues described above can be controlled by taking proper care of your foot during the healing process.   Your surgeon would be happy to fully describe any of the above issues. You should pursue a full understanding of the operation,recovery process and any potential problems that could develop.   PREVENTION  1.  Do not wear high heels if there is a family history of bunions.  2.  Wear shoes that have enough width and depth in the toe box  Here are exercises that may benefit people with bunions:   Toe stretches - Stretching out your toes can help keep them limber and offset foot pain. To stretch your toes, point  your toes straight ahead for 5 seconds and then curl them under for 5 seconds. Repeat these stretches 10 times. These exercises can be especially beneficial if you also have hammertoes, or chronically bent toes, in addition to a bunion.   Toe flexing and lionel - Press your toes against a hard surface such as a wall, to flex and stretch them; hold the position for 10 seconds and repeat three to four times. Then flex your toes in the opposite direction; hold the position for 10 seconds and repeat three to four times.   Stretching your big toe - Using your fingers to gently pull your big toe over into proper alignment can be helpful as well. Hold your toe in position for 10 seconds and repeat three to four times.   Resistance exercises - Wrap either a towel or belt around your big toe and use it to pull your big toe toward you while simultaneously pushing forward, against the towel, with your big toe.   Ball roll - To massage the bottom of your foot, sit down, place a golf ball on the floor under your foot, and roll it around under your foot for two minutes. This can help relieve foot strain and cramping.   Towel curls - You can strengthen your toes by spreading out a small towel on the floor, curling your toes around it, and pulling it toward you. Repeat five times. Gripping objects with your toes like this can help keep your foot flexible.   Picking up marbles - Another gripping exercise you can perform to keep your foot flexible is picking up marbles with your toes. Do this by placing 20 marbles on the floor in front of you and use your foot to pick the marbles up one by one and place them in a bowl.   Walking along the beach - Whenever possible, spend time walking on sand. This can give you a gentle foot massage and also help strengthen your toes. This is especially beneficial for people who have arthritis associated with their bunions.    HAMMERTOES  Hammertoe is a contracture (bending) of one or both joints  of the second, third, fourth, or fifth (little) toes. This abnormal bending can put pressure on the toe when wearing shoes, causing problems to develop.  Hammertoes usually start out as mild deformities and get progressively worse over time. In the earlier stages, hammertoes are flexible and the symptoms can often be managed with noninvasive measures. But if left untreated, hammertoes can become more rigid and will not respond to non-surgical treatment.  Because of the progressive nature of hammertoes, they should receive early attention. Hammertoes never get better without some kind of intervention.  CAUSES  The most common cause of hammertoe is a muscle/tendon imbalance. This imbalance, which leads to a bending of the toe, results from mechanical (structural) changes in the foot that occur over time in some people.  Hammertoes may be aggravated by shoes that don t fit properly. A hammertoe may result if a toe is too long and is forced into a cramped position when a tight shoe is worn.  Occasionally, hammertoe is the result of an earlier trauma to the toe. In some people, hammertoes are inherited.  SYMPTOMS  Pain or irritation of the affected toe when wearing shoes.   Corns and calluses (a buildup of skin) on the toe, between two toes, or on the ball of the foot. Corns are caused by constant friction against the shoe. They may be soft or hard, depending upon their location.   Inflammation, redness, or a burning sensation   Contracture of the toe   In more severe cases of hammertoe, open sores may form.   DIAGNOSIS  Although hammertoes are readily apparent, to arrive at a diagnosis the foot and ankle surgeon will obtain a thorough history of your symptoms and examine your foot. During the physical examination, the doctor may attempt to reproduce your symptoms by manipulating your foot and will study the contractures of the toes. In addition, the foot and ankle surgeon may take x-rays to determine the degree of the  deformities and assess any changes that may have occurred.   Hammertoes are progressive - they don t go away by themselves and usually they will get worse over time. However, not all cases are alike - some hammertoes progress more rapidly than others. Once your foot and ankle surgeon has evaluated your hammertoes, a treatment plan can be developed that is suited to your needs.  NON-SURGICAL TREATMENT  There is a variety of treatment options for hammertoe. The treatment your foot and ankle surgeon selects will depend upon the severity of your hammertoe and other factors.  A number of non-surgical measures can be undertaken:  Padding corns and calluses. Your foot and ankle surgeon can provide or prescribe pads designed to shield corns from irritation. If you want to try over-the-counter pads, avoid the medicated types. Medicated pads are generally not recommended because they may contain a small amount of acid that can be harmful. Consult your surgeon about this option.   Changes in shoewear. Avoid shoes with pointed toes, shoes that are too short, or shoes with high heels - conditions that can force your toe against the front of the shoe. Instead, choose comfortable shoes with a deep, roomy toe box and heels no higher than two inches.   Orthotic devices. A custom orthotic device placed in your shoe may help control the muscle/tendon imbalance.   Injection therapy. Corticosteroid injections are sometimes used to ease pain and inflammation caused by hammertoe.   Medications. Oral nonsteroidal anti-inflammatory drugs (NSAIDs), such as ibuprofen, may be recommended to reduce pain and inflammation.   Splinting/strapping. Splints or small straps may be applied by the surgeon to realign the bent toe.   Exercises:   1. The Toe Tap  Stand flat on the ground in your bare feet. Raise all of your toes up off the ground as high as you can. Then starting with the little toes, slowly press them down to the ground. After the big  toes are on the ground, start over by raising all of them up off the ground again. This motion is similar to tapping your fingers on a desk. Repeat this ten times.     2. Interlocking your Fingers and Toes  Cross your right foot over your knee and place the fingers of your left hand between your toes. Squeeze your toes together, pinching your fingers between them. Spread the toes apart and squeeze them together again. Repeat this ten times then do the other foot. Like most exercises, this will get easier the more you do it. If you are having a lot of difficulty with this exercise, start with just your index finger between your first and second toe, then later add your middle finger between your second and third toes, and so on until you can fit all your fingers between your toes. Do this ten times on each foot. Eventually you will be able to spread your toes apart without using your fingers.    3. Gripping the Floor   the floor by pressing the pads of your toes (not the tips) into the floor without curling your toes. Relax and repeat this ten times. If your shoes have the proper amount of depth, you should be able to do this with shoes on.    HAMMERTOE TOE SURGERY   Hammertoe surgery is complex. The surgical procedure is an attempt to help the toe lay in a better position. Nearly every structure in the toe will be cut including the tendons, ligaments, skin and bone. Hammertoes are a complex deformity and final toe position is difficult to predict. The only sure way to position a toe is to fuse all three digital joints. That will not happen as some degree of toe motion is needed for walking. The toe may not be completely reduced as the surrounding skin and other structures may not allow the toe to return to a normal position. The tendons on adjacent toes may need to be cut at the time of the original or subsequent surgeries, as interconnections exist between the toes. The toe may drift after surgery. Stiffness  may develop leading to new areas of pressure.   Future shoe choices will be critical in allowing the surgery to provide comfort. The toes will still hurt if shoes rub. The original pain may also persist as other foot problems may be contributing to the current pain. The toe may or may not be pinned in place. External pins would require complete avoidance of water on the foot for six weeks. The pin would be removed about six weeks after the surgery. Strict attention to protection is critical. The pin could get bumped or loosen resulting in early removal. Removal might be necessary before the bone heals which would negatively affect the final surgical outcome and toe allignment.

## 2022-09-28 NOTE — LETTER
9/28/2022         RE: Steffi Klein  [address]  [facility] [address]        Dear Colleague,    Thank you for referring your patient, Steffi Klein, to the [facility]. Please see a copy of my visit note below.    PATIENT HISTORY:    Steffi Klein is a 67 year old female who presents to clinic for a bunion on her right foot.  She states that she is not sure how long she has had the problem.  Notes that it throbs.  States it is causing the callus on the toe next to it that is very sore.  Pain can be 7 out of 10.  Worse at night when she wear shoes.  Denies specific injury.  She had surgery 10 years ago on her right ankle and has had numbness from that as well as tingling.  Wondering what can be done for the bunion and callus.    Review of Systems:  Patient denies fever, chills, rash, wound, stiffness, limping, numbness, weakness, heart burn, blood in stool, chest pain with activity, calf pain when walking, shortness of breath with activity, chronic cough, easy bleeding/bruising, swelling of ankles, excessive thirst, fatigue, depression, anxiety.  Patient admits to numbness to the right foot.     PAST MEDICAL HISTORY:   Past Medical History:   Diagnosis Date     Abnormal pulse oximetry 7/9/2018     Anxiety 12/28/2009     Bradycardia 7/9/2018     CARDIOVASCULAR SCREENING; LDL GOAL LESS THAN 160 10/31/2010     Chronic pain     left wrist     Distal radius fracture 1/28/2013     Esophageal reflux 7/2/2006     Hypotension, unspecified hypotension type 7/9/2018     Hypothyroidism 7/2/2014     Irritable bowel syndrome      Lumbago      Macrocytosis 4/22/2018     Neuropathy 6/30/2014    R foot post op      Non morbid obesity due to excess calories 7/18/2017     Obesity 7/13/2015     Onychomycosis 12/14/2017     JEROD (obstructive sleep apnea) 10/30/2018     Osteoporosis 2012     Reflux esophagitis      Restless legs syndrome (RLS)      Rosacea 7/13/2015     Rotator cuff  syndrome, left 3/9/2022     Skin erosion 3/9/2022     Squamous cell carcinoma of shoulder, left      Wrist pain 1/28/2013        PAST SURGICAL HISTORY:   Past Surgical History:   Procedure Laterality Date     COLONOSCOPY  5/2007     D & C       ESOPHAGOSCOPY, GASTROSCOPY, DUODENOSCOPY (EGD), COMBINED  10/24/2014     [facility]     ESOPHAGOSCOPY, GASTROSCOPY, DUODENOSCOPY (EGD), COMBINED N/A 10/24/2014    Procedure: COMBINED ESOPHAGOSCOPY, GASTROSCOPY, DUODENOSCOPY (EGD);  Surgeon: Black Alva MD;  Location: [facility]     HC REMOVAL DEEP IMPLANT Left 07/11/2018    [facility]     OPEN REDUCTION INTERNAL FIXATION WRIST  12/3/2012    Procedure: OPEN REDUCTION INTERNAL FIXATION WRIST;  Left Wrist Open Reduction Internal Fixation        ORTHOPEDIC SURGERY      right ankle surgery     skin cancer excision       TONSILLECTOMY  as a child      ZZC NONSPECIFIC PROCEDURE      3 NSVDs        MEDICATIONS:   Current Outpatient Medications:      Cholecalciferol (VITAMIN D) 1000 UNIT capsule, Take 1 capsule by mouth daily., Disp: 100 capsule, Rfl: 12     cyanocobalamin (VITAMIN B-12) 1000 MCG tablet, Take 1 tablet (1,000 mcg) by mouth daily, Disp: 100 tablet, Rfl: 3     doxycycline monohydrate (ADOXA) 100 MG tablet, Take 1 tablet (100 mg) by mouth daily, Disp: 90 tablet, Rfl: 3     Emollient (CERAVE) LOTN, Externally apply  topically., Disp: , Rfl:      levothyroxine (SYNTHROID/LEVOTHROID) 75 MCG tablet, Take 1 tablet (75 mcg) by mouth daily, Disp: 90 tablet, Rfl: 3     OMEPRAZOLE PO, Take 20 mg by mouth daily, Disp: , Rfl:      POTASSIUM CITRATE PO, Take 10 mEq by mouth daily, Disp: , Rfl:      topiramate (TOPAMAX) 100 MG tablet, Take 1 tablet (100 mg) by mouth daily, Disp: 90 tablet, Rfl: 3     ZYRTEC 10 MG OR TABS, ONE TABLET DAILY, Disp: 90, Rfl: 3     ALLERGIES:    Allergies   Allergen Reactions     Pcn [Penicillins] Rash     Gets a lot of yeast  build-up when on antibiotics         SOCIAL HISTORY:   Social History     Socioeconomic History     Marital status:      Spouse name: El     Number of children: 3     Years of education: Not on file     Highest education level: Some college, no degree   Occupational History     Occupation:      Employer: [email or web address]     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 No   Social History Narrative     Not on file     Social Determinants of Health     Financial Resource Strain: Low Risk      Difficulty of Paying Living Expenses: Not hard at all   Food Insecurity: No Food Insecurity     Worried About Running Out of Food in the Last Year: Never true     Ran Out of Food in the Last Year: Never true   Transportation Needs: No Transportation Needs     Lack of Transportation (Medical): No     Lack of Transportation (Non-Medical): No   Physical Activity: Unknown     Days of Exercise per Week: Patient refused     Minutes of Exercise per Session: Patient refused   Stress: No Stress Concern Present     Feeling of Stress : Not at all   Social Connections: Unknown     Frequency of Communication with Friends and Family: Three times a week     Frequency of Social Gatherings with Friends and Family: Patient refused     Attends Druze Services: Patient refused     Active Member of Clubs or Organizations: No     Attends Club or Organization Meetings: Not on file     Marital Status:    Intimate Partner Violence: Not  on file   Housing Stability: Low Risk      Unable to Pay for Housing in the Last Year: No     Number of Places Lived in the Last Year: 1     Unstable Housing in the Last Year: No        FAMILY HISTORY:   Family History   Problem Relation Age of Onset     Cancer Father         lung and brain cancer,  at age of 69     Other Cancer Father         Inoperable Brain Cancer     Cancer Mother         lung cancer, surgically resected     Arthritis Mother         has had knee surgeries     Alzheimer Disease Mother      Thyroid Disease Mother      Obesity Mother      Cancer Paternal Aunt          from breast CA     Alzheimer Disease Maternal Grandfather      Diabetes Other         Type 1     Diabetes Daughter         Type 1     Breast Cancer Other         Aunt (my Dad's sister)     Breast Cancer Cousin      Breast Cancer Cousin      Breast Cancer Niece         Bracket 1 breast cancer     Colon Cancer Other          from colon cancer     Anxiety Disorder Daughter         EXAM:Vitals: Wt 66.2 kg (146 lb)   LMP 2007   BMI 26.07 kg/m    BMI= Body mass index is 26.07 kg/m .      General appearance: Patient is alert and fully cooperative with history & exam.  No sign of distress is noted during the visit.     Psychiatric: Affect is pleasant & appropriate.  Patient appears motivated to improve health.     Respiratory: Breathing is regular & unlabored while sitting.     HEENT: Hearing is intact to spoken word.  Speech is clear.  No gross evidence of visual impairment that would impact ambulation.     Dermatologic: Localized hyperkeratotic lesions to the medial aspect of the right second DIPJ.  No open lesions or signs of infection noted.     Vascular: DP & PT pulses are intact & regular bilaterally.  No significant edema or varicosities noted.  CFT and skin temperature is normal to both lower extremities.     Neurologic: Lower extremity sensation is diminished to the right foot..     Musculoskeletal: Patient is  ambulatory without assistive device or brace.  Decreased range of motion with crepitus to the right first metatarsal phalangeal joint.  Semirigid contracture with overlapping right second toe on the great toe right foot.  Semiflexible contracture of left second toe.    Radiographs:right foot xray -  I personally reviewed the xrays -significant degenerative changes to the first metatarsal phalangeal joint.  Elongated second metatarsal with minimal subluxation of the second proximal phalanx on the metatarsal head.  No fractures are noted.     ASSESSMENT:    Right foot pain  Hammertoe of right foot  Neuropathy of right foot  Hallux limitus of right foot  Hammertoe of left foot     Medical Decision Making/Plan:  Reviewed patient's chart in [facility].  Reviewed and discussed causes of hallux limitus with patient.  Explained that it is a progressive arthritis meaning that over time, there is decrease in the joint space as well as bony spurring that occurs which leads to pain in the big toe especially with bending motions of the big toe joint.    Discussed treatment options with patient including rigid soled shoes or orthotics that are stiff under the big toe that help to prevent motion at that joint which is leading to pain and inflammation.  We discussed that sometimes cortisone injections can help with the pain or physical therapy treatments such as ultrasound.  Discussed that this is normally a structural issue in the foot and if conservative therapy doesn't work, surgery is considered.    We discussed that depending on the quality of the cartilage and/or of the joint determines if patient will need a joint sparing or fusion procedure.  Discussed that this can usually not be determined by x-ray and is an intra- op position.  With joint sparing procedure, and implant is placed in the joint to try to help keep the range of motion at that the toe. Patient is normally minimally weight bearing in a cam boot for 3 weeks.  With  fusion, patient is normally non weight bearing for 2 weeks, then minimal weight bearing for 4 weeks in boot.     Reviewed and discussed causes of hammertoes with patient.  Explained that this can be caused by an overpowering of muscles or by the way we walk.  Discussed conservative treatments such as orthotics, pads, shoe gear.  Explained that sometimes the flexor tendons can be cut to try and straighten the toe and reduce rubbing. This is normally done in office and patient is weight bearing in postop she for 1-2 weeks.  We also discussed surgical intervention to remove the joint and possibly fuse the toe.  Normally patient has a pin sticking out of the toe for about 6 weeks and can not get the foot wet. Patient would have to be minimal weight bearing in cam boot.      Discussed that based on exam she would likely need fusion of the first metatarsal phalangeal joint or possible distal osteotomy with osteotomy of the second metatarsal and PIPJ fusion to correct the hammertoe. Talked about risks including infection, numbness, continued pain, recurrence, need for further surgery, blood loss, blood clotting. You will scar.    She would like to think about surgery.  At this time would recommend inserts with a metatarsal pad as well as a toe  to help the right first and second toes from rubbing.  Also talked about a toe splint for the left second toe as it does not as overlapping to help with rubbing in the shoe.  All questions were answered to patient's satisfaction she will call for the questions or concerns.    Patient risk factor: Patient is at low risk for infection.        Elle Henderson DPM, Podiatry/Foot and Ankle Surgery        Again, thank you for allowing me to participate in the care of your patient.        Sincerely,        Elle Henderson DPM, Podiatry/Foot and Ankle Surgery

## 2022-09-28 NOTE — PROGRESS NOTES
PATIENT HISTORY:    Steffi Klein is a 67 year old female who presents to clinic for a bunion on her right foot.  She states that she is not sure how long she has had the problem.  Notes that it throbs.  States it is causing the callus on the toe next to it that is very sore.  Pain can be 7 out of 10.  Worse at night when she wear shoes.  Denies specific injury.  She had surgery 10 years ago on her right ankle and has had numbness from that as well as tingling.  Wondering what can be done for the bunion and callus.    Review of Systems:  Patient denies fever, chills, rash, wound, stiffness, limping, numbness, weakness, heart burn, blood in stool, chest pain with activity, calf pain when walking, shortness of breath with activity, chronic cough, easy bleeding/bruising, swelling of ankles, excessive thirst, fatigue, depression, anxiety.  Patient admits to numbness to the right foot.     PAST MEDICAL HISTORY:   Past Medical History:   Diagnosis Date     Abnormal pulse oximetry 7/9/2018     Anxiety 12/28/2009     Bradycardia 7/9/2018     CARDIOVASCULAR SCREENING; LDL GOAL LESS THAN 160 10/31/2010     Chronic pain     left wrist     Distal radius fracture 1/28/2013     Esophageal reflux 7/2/2006     Hypotension, unspecified hypotension type 7/9/2018     Hypothyroidism 7/2/2014     Irritable bowel syndrome      Lumbago      Macrocytosis 4/22/2018     Neuropathy 6/30/2014    R foot post op      Non morbid obesity due to excess calories 7/18/2017     Obesity 7/13/2015     Onychomycosis 12/14/2017     JEROD (obstructive sleep apnea) 10/30/2018     Osteoporosis 2012     Reflux esophagitis      Restless legs syndrome (RLS)      Rosacea 7/13/2015     Rotator cuff syndrome, left 3/9/2022     Skin erosion 3/9/2022     Squamous cell carcinoma of shoulder, left      Wrist pain 1/28/2013        PAST SURGICAL HISTORY:   Past Surgical History:   Procedure Laterality Date     COLONOSCOPY  5/2007     D & C       ESOPHAGOSCOPY,  GASTROSCOPY, DUODENOSCOPY (EGD), COMBINED  10/24/2014     [facility]     ESOPHAGOSCOPY, GASTROSCOPY, DUODENOSCOPY (EGD), COMBINED N/A 10/24/2014    Procedure: COMBINED ESOPHAGOSCOPY, GASTROSCOPY, DUODENOSCOPY (EGD);  Surgeon: Black Alva MD;  Location: [facility]     HC REMOVAL DEEP IMPLANT Left 07/11/2018    [facility]     OPEN REDUCTION INTERNAL FIXATION WRIST  12/3/2012    Procedure: OPEN REDUCTION INTERNAL FIXATION WRIST;  Left Wrist Open Reduction Internal Fixation        ORTHOPEDIC SURGERY      right ankle surgery     skin cancer excision       TONSILLECTOMY  as a child      ZZC NONSPECIFIC PROCEDURE      3 NSVDs        MEDICATIONS:   Current Outpatient Medications:      Cholecalciferol (VITAMIN D) 1000 UNIT capsule, Take 1 capsule by mouth daily., Disp: 100 capsule, Rfl: 12     cyanocobalamin (VITAMIN B-12) 1000 MCG tablet, Take 1 tablet (1,000 mcg) by mouth daily, Disp: 100 tablet, Rfl: 3     doxycycline monohydrate (ADOXA) 100 MG tablet, Take 1 tablet (100 mg) by mouth daily, Disp: 90 tablet, Rfl: 3     Emollient (CERAVE) LOTN, Externally apply  topically., Disp: , Rfl:      levothyroxine (SYNTHROID/LEVOTHROID) 75 MCG tablet, Take 1 tablet (75 mcg) by mouth daily, Disp: 90 tablet, Rfl: 3     OMEPRAZOLE PO, Take 20 mg by mouth daily, Disp: , Rfl:      POTASSIUM CITRATE PO, Take 10 mEq by mouth daily, Disp: , Rfl:      topiramate (TOPAMAX) 100 MG tablet, Take 1 tablet (100 mg) by mouth daily, Disp: 90 tablet, Rfl: 3     ZYRTEC 10 MG OR TABS, ONE TABLET DAILY, Disp: 90, Rfl: 3     ALLERGIES:    Allergies   Allergen Reactions     Pcn [Penicillins] Rash     Gets a lot of yeast build-up when on antibiotics         SOCIAL HISTORY:   Social History     Socioeconomic History     Marital status:      Spouse name: El     Number of children: 3     Years of education: Not on file     Highest education level: Some college, no degree    Occupational History     Occupation:      Employer: [employer]     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 No   Social History Narrative     Not on file     Social Determinants of Health     Financial Resource Strain: Low Risk      Difficulty of Paying Living Expenses: Not hard at all   Food Insecurity: No Food Insecurity     Worried About Running Out of Food in the Last Year: Never true     Ran Out of Food in the Last Year: Never true   Transportation Needs: No Transportation Needs     Lack of Transportation (Medical): No     Lack of Transportation (Non-Medical): No   Physical Activity: Unknown     Days of Exercise per Week: Patient refused     Minutes of Exercise per Session: Patient refused   Stress: No Stress Concern Present     Feeling of Stress : Not at all   Social Connections: Unknown     Frequency of Communication with Friends and Family: Three times a week     Frequency of Social Gatherings with Friends and Family: Patient refused     Attends Adventism Services: Patient refused     Active Member of Clubs or Organizations: No     Attends Club or Organization Meetings: Not on file     Marital Status:    Intimate Partner Violence: Not on file   Housing Stability: Low Risk      Unable to Pay for Housing in the Last Year: No     Number of Places Lived in the Last Year: 1     Unstable Housing in the Last Year: No        FAMILY HISTORY:   Family History   Problem Relation Age of Onset      Cancer Father         lung and brain cancer,  at age of 69     Other Cancer Father         Inoperable Brain Cancer     Cancer Mother         lung cancer, surgically resected     Arthritis Mother         has had knee surgeries     Alzheimer Disease Mother      Thyroid Disease Mother      Obesity Mother      Cancer Paternal Aunt          from breast CA     Alzheimer Disease Maternal Grandfather      Diabetes Other         Type 1     Diabetes Daughter         Type 1     Breast Cancer Other         Aunt (my Dad's sister)     Breast Cancer Cousin      Breast Cancer Cousin      Breast Cancer Niece         Bracket 1 breast cancer     Colon Cancer Other          from colon cancer     Anxiety Disorder Daughter         EXAM:Vitals: Wt 66.2 kg (146 lb)   LMP 2007   BMI 26.07 kg/m    BMI= Body mass index is 26.07 kg/m .      General appearance: Patient is alert and fully cooperative with history & exam.  No sign of distress is noted during the visit.     Psychiatric: Affect is pleasant & appropriate.  Patient appears motivated to improve health.     Respiratory: Breathing is regular & unlabored while sitting.     HEENT: Hearing is intact to spoken word.  Speech is clear.  No gross evidence of visual impairment that would impact ambulation.     Dermatologic: Localized hyperkeratotic lesions to the medial aspect of the right second DIPJ.  No open lesions or signs of infection noted.     Vascular: DP & PT pulses are intact & regular bilaterally.  No significant edema or varicosities noted.  CFT and skin temperature is normal to both lower extremities.     Neurologic: Lower extremity sensation is diminished to the right foot..     Musculoskeletal: Patient is ambulatory without assistive device or brace.  Decreased range of motion with crepitus to the right first metatarsal phalangeal joint.  Semirigid contracture with overlapping right second toe on the great toe right foot.  Semiflexible contracture of left  second toe.    Radiographs:right foot xray -  I personally reviewed the xrays -significant degenerative changes to the first metatarsal phalangeal joint.  Elongated second metatarsal with minimal subluxation of the second proximal phalanx on the metatarsal head.  No fractures are noted.     ASSESSMENT:    Right foot pain  Hammertoe of right foot  Neuropathy of right foot  Hallux limitus of right foot  Hammertoe of left foot     Medical Decision Making/Plan:  Reviewed patient's chart in [facility].  Reviewed and discussed causes of hallux limitus with patient.  Explained that it is a progressive arthritis meaning that over time, there is decrease in the joint space as well as bony spurring that occurs which leads to pain in the big toe especially with bending motions of the big toe joint.    Discussed treatment options with patient including rigid soled shoes or orthotics that are stiff under the big toe that help to prevent motion at that joint which is leading to pain and inflammation.  We discussed that sometimes cortisone injections can help with the pain or physical therapy treatments such as ultrasound.  Discussed that this is normally a structural issue in the foot and if conservative therapy doesn't work, surgery is considered.    We discussed that depending on the quality of the cartilage and/or of the joint determines if patient will need a joint sparing or fusion procedure.  Discussed that this can usually not be determined by x-ray and is an intra- op position.  With joint sparing procedure, and implant is placed in the joint to try to help keep the range of motion at that the toe. Patient is normally minimally weight bearing in a cam boot for 3 weeks.  With fusion, patient is normally non weight bearing for 2 weeks, then minimal weight bearing for 4 weeks in boot.     Reviewed and discussed causes of hammertoes with patient.  Explained that this can be caused by an overpowering of muscles or by the way we  walk.  Discussed conservative treatments such as orthotics, pads, shoe gear.  Explained that sometimes the flexor tendons can be cut to try and straighten the toe and reduce rubbing. This is normally done in office and patient is weight bearing in postop she for 1-2 weeks.  We also discussed surgical intervention to remove the joint and possibly fuse the toe.  Normally patient has a pin sticking out of the toe for about 6 weeks and can not get the foot wet. Patient would have to be minimal weight bearing in cam boot.      Discussed that based on exam she would likely need fusion of the first metatarsal phalangeal joint or possible distal osteotomy with osteotomy of the second metatarsal and PIPJ fusion to correct the hammertoe. Talked about risks including infection, numbness, continued pain, recurrence, need for further surgery, blood loss, blood clotting. You will scar.    She would like to think about surgery.  At this time would recommend inserts with a metatarsal pad as well as a toe  to help the right first and second toes from rubbing.  Also talked about a toe splint for the left second toe as it does not as overlapping to help with rubbing in the shoe.  All questions were answered to patient's satisfaction she will call for the questions or concerns.    Patient risk factor: Patient is at low risk for infection.        Elle Henderson DPM, Podiatry/Foot and Ankle Surgery

## 2022-10-12 NOTE — ASSESSMENT & PLAN NOTE
She developed left upper arm pain when she got her new rowing machine.  Now resolved.  Full range of motion.  Pain with internal rotation against resistance.  Discussed rotator cuff, interventions, modification of exercise   [Good] : ~his/her~  mood as  good [Former] : Former [10-14] : 10-14 [Yes] : Yes [Patient reported colonoscopy was abnormal] : Patient reported colonoscopy was abnormal [YearQuit] : 2000 [ColonoscopyDate] : 01/2018 [ColonoscopyComments] : Polyps, F/U in 5 years

## 2023-03-16 DIAGNOSIS — E03.4 HYPOTHYROIDISM DUE TO ACQUIRED ATROPHY OF THYROID: ICD-10-CM

## 2023-03-16 RX ORDER — LEVOTHYROXINE SODIUM 75 UG/1
TABLET ORAL
Qty: 90 TABLET | Refills: 0 | Status: SHIPPED | OUTPATIENT
Start: 2023-03-16 | End: 2023-04-18

## 2023-03-16 NOTE — TELEPHONE ENCOUNTER
Routing refill request to provider for review/approval because:  Labs not current:  [facility]  Patient needs to be seen because it has been more than 1 year since last office visit.    Janna Wild RN

## 2023-04-10 DIAGNOSIS — G62.9 NEUROPATHY: ICD-10-CM

## 2023-04-10 DIAGNOSIS — L71.9 ROSACEA: ICD-10-CM

## 2023-04-10 RX ORDER — TOPIRAMATE 100 MG/1
100 TABLET, FILM COATED ORAL DAILY
Qty: 90 TABLET | Refills: 0 | Status: SHIPPED | OUTPATIENT
Start: 2023-04-10 | End: 2023-04-18

## 2023-04-10 RX ORDER — DOXYCYCLINE 100 MG/1
TABLET ORAL
Qty: 90 TABLET | Refills: 0 | Status: SHIPPED | OUTPATIENT
Start: 2023-04-10 | End: 2023-04-18

## 2023-04-10 NOTE — TELEPHONE ENCOUNTER
Routing refill request to provider for review/approval because:  Drug not on the [facility] refill protocol   Patient needs to be seen because it has been more than 1 year since last office visit.  Upcoming appt 4/18/23.    Janna Wild RN

## 2023-04-15 ENCOUNTER — HEALTH MAINTENANCE LETTER (OUTPATIENT)
Age: 68
End: 2023-04-15

## 2023-04-18 ENCOUNTER — OFFICE VISIT (OUTPATIENT)
Dept: FAMILY MEDICINE | Facility: CLINIC | Age: 68
End: 2023-04-18
Payer: COMMERCIAL

## 2023-04-18 VITALS
SYSTOLIC BLOOD PRESSURE: 115 MMHG | WEIGHT: 158.6 LBS | HEIGHT: 62 IN | RESPIRATION RATE: 18 BRPM | DIASTOLIC BLOOD PRESSURE: 73 MMHG | HEART RATE: 63 BPM | BODY MASS INDEX: 29.19 KG/M2 | TEMPERATURE: 98 F | OXYGEN SATURATION: 98 %

## 2023-04-18 DIAGNOSIS — Z00.00 ROUTINE GENERAL MEDICAL EXAMINATION AT A HEALTH CARE FACILITY: Primary | ICD-10-CM

## 2023-04-18 DIAGNOSIS — Z78.0 MENOPAUSE: ICD-10-CM

## 2023-04-18 DIAGNOSIS — Z12.11 SCREEN FOR COLON CANCER: ICD-10-CM

## 2023-04-18 DIAGNOSIS — G62.9 NEUROPATHY: ICD-10-CM

## 2023-04-18 DIAGNOSIS — D75.89 MACROCYTOSIS: ICD-10-CM

## 2023-04-18 DIAGNOSIS — L71.9 ROSACEA: ICD-10-CM

## 2023-04-18 DIAGNOSIS — E03.4 HYPOTHYROIDISM DUE TO ACQUIRED ATROPHY OF THYROID: ICD-10-CM

## 2023-04-18 DIAGNOSIS — Z91.89 PNEUMOCOCCAL VACCINATION INDICATED: ICD-10-CM

## 2023-04-18 PROBLEM — Z23 PNEUMOCOCCAL VACCINATION ADMINISTERED AT CURRENT VISIT: Status: ACTIVE | Noted: 2023-04-18

## 2023-04-18 PROCEDURE — 99397 PER PM REEVAL EST PAT 65+ YR: CPT | Performed by: FAMILY MEDICINE

## 2023-04-18 RX ORDER — LANOLIN ALCOHOL/MO/W.PET/CERES
1000 CREAM (GRAM) TOPICAL DAILY
Qty: 100 TABLET | Refills: 3 | Status: SHIPPED | OUTPATIENT
Start: 2023-04-18

## 2023-04-18 RX ORDER — TOPIRAMATE 100 MG/1
100 TABLET, FILM COATED ORAL DAILY
Qty: 90 TABLET | Refills: 3 | Status: SHIPPED | OUTPATIENT
Start: 2023-04-18 | End: 2024-05-15

## 2023-04-18 RX ORDER — LEVOTHYROXINE SODIUM 75 UG/1
75 TABLET ORAL DAILY
Qty: 90 TABLET | Refills: 3 | Status: SHIPPED | OUTPATIENT
Start: 2023-04-18 | End: 2024-05-15

## 2023-04-18 RX ORDER — DOXYCYCLINE 100 MG/1
100 TABLET ORAL DAILY
Qty: 90 TABLET | Refills: 3 | Status: SHIPPED | OUTPATIENT
Start: 2023-04-18 | End: 2024-05-15 | Stop reason: ALTCHOICE

## 2023-04-18 ASSESSMENT — ACTIVITIES OF DAILY LIVING (ADL): CURRENT_FUNCTION: NO ASSISTANCE NEEDED

## 2023-04-18 NOTE — PROGRESS NOTES
She is at risk for lack of exercise and has been provided with information to increase physical activity for the benefit of her well-Raul Newman MD    being.  Answers for HPI/ROS submitted by the patient on 4/12/2023  What is the reason for your visit today? : Physical/Med check  How many servings of fruits and vegetables do you eat daily?: 2-3  On average, how many sweetened beverages do you drink each day (Examples: soda, juice, sweet tea, etc.  Do NOT count diet or artificially sweetened beverages)?: 0  How many minutes a day do you exercise enough to make your heart beat faster?: 9 or less  How many days a week do you exercise enough to make your heart beat faster?: 3 or less  How many days per week do you miss taking your medication?: 0

## 2023-04-18 NOTE — PATIENT INSTRUCTIONS
Preventive Health Recommendations    See your health care provider every year to    Review health changes.     Discuss preventive care.      Review your medicines if your doctor has prescribed any.      You no longer need a yearly Pap test unless you've had an abnormal Pap test in the past 10 years. If you have vaginal symptoms, such as bleeding or discharge, be sure to talk with your provider about a Pap test.      Every 1 to 2 years, have a mammogram.  If you are over 69, talk with your health care provider about whether or not you want to continue having screening mammograms.      Every 10 years, have a colonoscopy. Or, have a yearly FIT test (stool test). These exams will check for colon cancer.       Have a cholesterol test every 5 years, or more often if your doctor advises it.       Have a diabetes test (fasting glucose) every three years. If you are at risk for diabetes, you should have this test more often.       At age 65, have a bone density scan (DEXA) to check for osteoporosis (brittle bone disease).    Shots:    Get a flu shot each year.    Get a tetanus shot every 10 years.    Talk to your doctor about your pneumonia vaccines. There are now two you should receive - Pneumovax (PPSV 23) and Prevnar (PCV 13).    Talk to your pharmacist about the shingles vaccine.    Talk to your doctor about the hepatitis B vaccine.    Nutrition:     Eat at least 5 servings of fruits and vegetables each day.      Eat whole-grain bread, whole-wheat pasta and brown rice instead of white grains and rice.      Get adequate about Calcium and Vitamin D.     Lifestyle    Exercise at least 150 minutes a week (30 minutes a day, 5 days a week). This will help you control your weight and prevent disease.      Limit alcohol to one drink per day.      No smoking.       Wear sunscreen to prevent skin cancer.       See your dentist twice a year for an exam and cleaning.      See your eye doctor every 1 to 2 years to screen for  conditions such as glaucoma, macular degeneration, cataracts, etc.    Personalized Prevention Plan  You are due for the preventive services outlined below.  Your care team is available to assist you in scheduling these services.  If you have already completed any of these items, please share that information with your care team to update in your medical record.    Health Maintenance Due   Topic Date Due     Osteoporosis Screening  07/13/2018     Pneumococcal Vaccine (1 - PCV) Never done     Annual Wellness Visit  03/09/2023     Comprehensive Metabolic Panel  03/09/2023     Thyroid Function Lab  03/09/2023     Complete Blood Count  03/09/2023     Colorectal Cancer Screening  03/10/2023     Patient Education   Personalized Prevention Plan  You are due for the preventive services outlined below.  Your care team is available to assist you in scheduling these services.  If you have already completed any of these items, please share that information with your care team to update in your medical record.  Health Maintenance Due   Topic Date Due     Osteoporosis Screening  07/13/2018     Pneumococcal Vaccine (1 - PCV) Never done     Annual Wellness Visit  03/09/2023     Comprehensive Metabolic Panel  03/09/2023     Thyroid Function Lab  03/09/2023     Complete Blood Count  03/09/2023     Colorectal Cancer Screening  03/10/2023       Exercise for a Healthier Heart  You may wonder how you can improve the health of your heart. If you re thinking about exercise, you re on the right track. You don t need to become an athlete. But you do need a certain amount of brisk exercise to help strengthen your heart. If you have been diagnosed with a heart condition, your healthcare provider may advise exercise to help your condition. To help make exercise a habit, choose safe, fun activities.      Exercise with a friend. When activity is fun, you're more likely to stick with it.     Before you start  Check with your healthcare provider  before starting an exercise program. This is especially important if you haven't been active for a while. It's also important if you have a long-term (chronic) health problem such as heart disease, diabetes, or obesity. Also check with your provider if you're at high risk for having these problems.   Why exercise?  Exercising regularly offers many healthy rewards. It can help you do all of these:     Improve your blood cholesterol level to help prevent further heart trouble.    Lower your blood pressure to help prevent a stroke or heart attack.    Control diabetes or reduce your risk of getting this disease.    Improve your heart and lung function.    Reach and stay at a healthy weight.    Make your muscles stronger so you can stay active.    Prevent falls and fractures by slowing the loss of bone mass (osteoporosis).    Manage stress better.    Improve your sense of self and your body image.  Exercise tips      Ease into your routine. Set small goals. Then build on them. Talk with your healthcare provider first before starting an exercise routine if you're not sure what your activity level should be.    Exercise on most days. Aim for a total of at least 150 minutes (2 hours and 30 minutes) or more of moderate-intensity aerobic activity each week. You could also do 75 minutes (1 hour and 15 minutes) or more of vigorous-intensity aerobic activity each week. Or try for a combination of both. Moderate activity means that you breathe heavier and your heart rate increases, but you can still talk. Think about doing at least 30 minutes of moderate exercise, 5 times a week. It's OK to work up to the 30-minute period over time. Examples of moderate-intensity activity are brisk walking, gardening, and water aerobics.    Step up your daily activity level.  Along with your exercise program, try being more active the whole day. Walk instead of drive. Or park further away so that you take more steps each day. Do more household  tasks or yard work. You may not be able to meet the advised amount of physical activity. But doing some moderate- or vigorous-intensity aerobic activity can help reduce your risk for heart disease. Your healthcare provider can help you figure out what is best for you.    Choose 1 or more activities you enjoy.  Walking is one of the easiest things you can do. You can also try swimming, riding a bike, dancing, or taking an exercise class.    Call 911  Call 911 right away if any of these occur:     Chest pain that doesn't go away quickly with rest    New burning, tightness, pressure, or heaviness in your chest, neck, shoulders, back, or arms    Abnormal or severe shortness of breath    A very fast or irregular heartbeat (palpitations)    Fainting  When to call your healthcare provider  Call your healthcare provider if you have any of these:     Dizziness or lightheadedness    Mild shortness of breath or chest pain    Increased or new joint or muscle pain    Joey last reviewed this educational content on 7/1/2022 2000-2022 [employer]. All rights reserved. This information is not intended as a substitute for professional medical care. Always follow your healthcare professional's instructions.

## 2023-04-18 NOTE — ASSESSMENT & PLAN NOTE
Osteopenia years ago. She would like to delay restudy until May due to insurance reimbursement/change

## 2023-04-18 NOTE — PROGRESS NOTES
SUBJECTIVE:   CC: Steffi is an 67 year old who presents for preventive health visit.       2023    11:09 AM   Additional Questions   Roomed by Jessie Rosado   Patient has been advised of split billing requirements and indicates understanding: Yes  Healthy Habits:     In general, how would you rate your overall health?  Very good    Frequency of exercise:  1 day/week    Duration of exercise:  Less than 15 minutes    Taking medications regularly:  Yes    Barriers to taking medications:  None    Medication side effects:  None    Ability to successfully perform activities of daily living:  No assistance needed    Home Safety:  No safety concerns identified    Hearing Impairment:  No hearing concerns    In the past 6 months, have you been bothered by leaking of urine?  No    In general, how would you rate your overall mental or emotional health?  Very good      PHQ-2 Total Score: 0    Additional concerns today:  No  History of Present Illness       Reason for visit:  Physical/Med check    She eats 2-3 servings of fruits and vegetables daily.She consumes 0 sweetened beverage(s) daily.She exercises with enough effort to increase her heart rate 9 or less minutes per day.  She exercises with enough effort to increase her heart rate 3 or less days per week.   She is not taking prescribed medications regularly due to None.              Today's PHQ-2 Score:       2023     6:33 AM   PHQ-2 (  [employer])   Q1: Little interest or pleasure in doing things 0   Q2: Feeling down, depressed or hopeless 0   PHQ-2 Score 0   Q1: Little interest or pleasure in doing things Not at all   Q2: Feeling down, depressed or hopeless Not at all   PHQ-2 Score 0           Social History     Tobacco Use     Smoking status: Former     Packs/day: 0.50     Years: 7.00     Pack years: 3.50     Types: Cigarettes     Quit date: 1977     Years since quittin.4     Smokeless tobacco: Never     Tobacco comments:     quit    Vaping  "Use     Vaping status: Never Used   Substance Use Topics     Alcohol use: Yes     Comment: socially             3/9/2022     7:19 AM   Alcohol Use   Prescreen: >3 drinks/day or >7 drinks/week? No     Reviewed orders with patient.  Reviewed health maintenance and updated orders accordingly - Yes    Breast Cancer Screening:    [facility]:       3/9/2022     7:25 AM 4/11/2022     8:34 AM 4/11/2022     8:36 AM   Breast CA Risk Assessment ([facility]-7)   Did any of your first-degree relatives have breast or ovarian cancer? Yes No No   Did any of your relatives have bilateral breast cancer? Unknown No No   Did any man in your family have breast cancer? Unknown No No   Did any woman in your family have breast and ovarian cancer? Unknown No Yes   Did any woman in your family have breast cancer before age 50 y? Yes No Yes   Do you have 2 or more relatives with breast and/or ovarian cancer? Yes No Yes   Do you have 2 or more relatives with breast and/or bowel cancer? Yes No No     \"Mammogram Screening: Recommended mammography every 1-2 years with patient discussion and risk factor consideration  Pertinent mammograms are reviewed under the imaging tab.    History of abnormal Pap smear: NO - age 30-65 PAP every 5 years with negative HPV co-testing recommended      Latest Ref Rng & Units 10/27/2020     4:11 PM 10/27/2020     3:30 PM 7/13/2015     7:14 PM   PAP / HPV   PAP (Historical)  NIL       HPV 16 DNA NEG^Negative  Negative   Negative     HPV 18 DNA NEG^Negative  Negative   Negative     Other HR HPV NEG^Negative  Negative   Negative       Reviewed and updated as needed this visit by clinical staff   Tobacco  Allergies  Meds              Reviewed and updated as needed this visit by Provider                     Review of Systems  CONSTITUTIONAL: NEGATIVE for fever, chills, change in weight  INTEGUMENTARY/SKIN: NEGATIVE for worrisome rashes, moles or lesions  EYES: NEGATIVE for vision changes or irritation  ENT: NEGATIVE for ear, " "mouth and throat problems  RESP: NEGATIVE for significant cough or SOB  BREAST: NEGATIVE for masses, tenderness or discharge  CV: NEGATIVE for chest pain, palpitations or peripheral edema  GI: NEGATIVE for nausea, abdominal pain, heartburn, or change in bowel habits  : NEGATIVE for unusual urinary or vaginal symptoms. No vaginal bleeding.  MUSCULOSKELETAL:R elbow pain a few days resolved with OTC NSAID. Occ trivial L knee pain  NEURO: NEGATIVE for weakness, dizziness or paresthesias  PSYCHIATRIC: NEGATIVE for changes in mood or affect      OBJECTIVE:   /73 (BP Location: Right arm, Patient Position: Sitting, Cuff Size: Adult Regular)   Pulse 63   Temp 98  F (36.7  C) (Oral)   Resp 18   Ht 1.562 m (5' 1.5\")   Wt 71.9 kg (158 lb 9.6 oz)   LMP 08/13/2007   SpO2 98%   BMI 29.48 kg/m    Physical Exam  Constitutional:       Appearance: Normal appearance.   HENT:      Head: Atraumatic.      Right Ear: Tympanic membrane normal.      Left Ear: Tympanic membrane normal.      Nose: Nose normal.      Mouth/Throat:      Mouth: Mucous membranes are moist.   Eyes:      Pupils: Pupils are equal, round, and reactive to light.   Cardiovascular:      Rate and Rhythm: Normal rate and regular rhythm.      Heart sounds: Normal heart sounds.   Pulmonary:      Effort: Pulmonary effort is normal.      Breath sounds: Normal breath sounds.   Abdominal:      General: Abdomen is flat. Bowel sounds are normal.   Musculoskeletal:      Cervical back: Neck supple.      Right lower leg: No edema.      Left lower leg: No edema.   Skin:     General: Skin is warm and dry.   Neurological:      General: No focal deficit present.      Mental Status: She is alert.   Psychiatric:         Mood and Affect: Mood normal.               ASSESSMENT/PLAN:     Problem List Items Addressed This Visit     Hypothyroidism     Yearly TSH. She would like to delay until May due to insurance reimbursement/change         Relevant Medications    levothyroxine " (SYNTHROID/LEVOTHROID) 75 MCG tablet    Other Relevant Orders    TSH WITH FREE T4 REFLEX    Macrocytosis     Resolved at last measure. Monitor. Continue B12         Relevant Medications    cyanocobalamin (VITAMIN B-12) 1000 MCG tablet    Menopause     Osteopenia years ago. She would like to delay restudy until May due to insurance reimbursement/change         Relevant Orders    DEXA HIP/PELVIS/SPINE - Future    Neuropathy     Painless. Continue         Relevant Medications    topiramate (TOPAMAX) 100 MG tablet    Pneumococcal vaccination indicated     She would like to delay until May due to insurance reimbursement/change         Relevant Orders    Pneumococcal 20 Valent Conjugate (PCV20)    Rosacea     Not visible. Continue current therapy         Relevant Medications    doxycycline monohydrate (ADOXA) 100 MG tablet    Routine general medical examination at a health care facility - Primary    Relevant Orders    COMPREHENSIVE METABOLIC PANEL    CBC with Platelets & Differential    Screen for colon cancer     dispense         Relevant Orders    Fecal colorectal cancer screen FIT - Future (S+30)       Patient has been advised of split billing requirements and indicates understanding: Yes      COUNSELING:  Reviewed preventive health counseling, as reflected in patient instructions        She reports that she quit smoking about 45 years ago. Her smoking use included cigarettes. She has a 3.50 pack-year smoking history. She has never used smokeless tobacco.      Raul Newman MD  [facility]

## 2023-07-12 ENCOUNTER — ANCILLARY PROCEDURE (OUTPATIENT)
Dept: BONE DENSITY | Facility: CLINIC | Age: 68
End: 2023-07-12
Attending: FAMILY MEDICINE
Payer: COMMERCIAL

## 2023-07-12 DIAGNOSIS — Z78.0 MENOPAUSE: ICD-10-CM

## 2023-07-12 PROCEDURE — 77080 DXA BONE DENSITY AXIAL: CPT | Performed by: INTERNAL MEDICINE

## 2023-07-16 NOTE — RESULT ENCOUNTER NOTE
You have thin bones (osteopenia), but not osteoporosis. We do not treat that. If you drink milk, you should be OK. Yo might repeat this test in 5 years or so  Raul Newman MD

## 2023-07-19 ENCOUNTER — LAB (OUTPATIENT)
Dept: LAB | Facility: CLINIC | Age: 68
End: 2023-07-19
Payer: COMMERCIAL

## 2023-07-19 ENCOUNTER — ALLIED HEALTH/NURSE VISIT (OUTPATIENT)
Dept: FAMILY MEDICINE | Facility: CLINIC | Age: 68
End: 2023-07-19
Payer: COMMERCIAL

## 2023-07-19 DIAGNOSIS — Z12.11 SCREEN FOR COLON CANCER: ICD-10-CM

## 2023-07-19 DIAGNOSIS — Z23 NEED FOR PNEUMOCOCCAL 20-VALENT CONJUGATE VACCINATION: Primary | ICD-10-CM

## 2023-07-19 DIAGNOSIS — E03.9 HYPOTHYROIDISM: Primary | ICD-10-CM

## 2023-07-19 LAB
ALBUMIN SERPL BCG-MCNC: 4.4 G/DL (ref 3.5–5.2)
ALP SERPL-CCNC: 54 U/L (ref 35–104)
ALT SERPL W P-5'-P-CCNC: 34 U/L (ref 0–50)
ANION GAP SERPL CALCULATED.3IONS-SCNC: 10 MMOL/L (ref 7–15)
AST SERPL W P-5'-P-CCNC: 33 U/L (ref 0–45)
BASOPHILS # BLD AUTO: 0 10E3/UL (ref 0–0.2)
BASOPHILS NFR BLD AUTO: 1 %
BILIRUB SERPL-MCNC: 0.3 MG/DL
BUN SERPL-MCNC: 28.5 MG/DL (ref 8–23)
CALCIUM SERPL-MCNC: 9.3 MG/DL (ref 8.8–10.2)
CHLORIDE SERPL-SCNC: 104 MMOL/L (ref 98–107)
CREAT SERPL-MCNC: 0.84 MG/DL (ref 0.51–0.95)
DEPRECATED HCO3 PLAS-SCNC: 26 MMOL/L (ref 22–29)
EOSINOPHIL # BLD AUTO: 0.1 10E3/UL (ref 0–0.7)
EOSINOPHIL NFR BLD AUTO: 1 %
ERYTHROCYTE [DISTWIDTH] IN BLOOD BY AUTOMATED COUNT: 12 % (ref 10–15)
GFR SERPL CREATININE-BSD FRML MDRD: 76 ML/MIN/1.73M2
GLUCOSE SERPL-MCNC: 89 MG/DL (ref 70–99)
HCT VFR BLD AUTO: 42.5 % (ref 35–47)
HGB BLD-MCNC: 13.4 G/DL (ref 11.7–15.7)
IMM GRANULOCYTES # BLD: 0 10E3/UL
IMM GRANULOCYTES NFR BLD: 0 %
LYMPHOCYTES # BLD AUTO: 2.4 10E3/UL (ref 0.8–5.3)
LYMPHOCYTES NFR BLD AUTO: 33 %
MCH RBC QN AUTO: 31.6 PG (ref 26.5–33)
MCHC RBC AUTO-ENTMCNC: 31.5 G/DL (ref 31.5–36.5)
MCV RBC AUTO: 100 FL (ref 78–100)
MONOCYTES # BLD AUTO: 0.4 10E3/UL (ref 0–1.3)
MONOCYTES NFR BLD AUTO: 6 %
NEUTROPHILS # BLD AUTO: 4.4 10E3/UL (ref 1.6–8.3)
NEUTROPHILS NFR BLD AUTO: 60 %
PLATELET # BLD AUTO: 226 10E3/UL (ref 150–450)
POTASSIUM SERPL-SCNC: 4.5 MMOL/L (ref 3.4–5.3)
PROT SERPL-MCNC: 7 G/DL (ref 6.4–8.3)
RBC # BLD AUTO: 4.24 10E6/UL (ref 3.8–5.2)
SODIUM SERPL-SCNC: 140 MMOL/L (ref 136–145)
TSH SERPL DL<=0.005 MIU/L-ACNC: 1.7 UIU/ML (ref 0.3–4.2)
WBC # BLD AUTO: 7.4 10E3/UL (ref 4–11)

## 2023-07-19 PROCEDURE — 80053 COMPREHEN METABOLIC PANEL: CPT

## 2023-07-19 PROCEDURE — 84443 ASSAY THYROID STIM HORMONE: CPT

## 2023-07-19 PROCEDURE — 85025 COMPLETE CBC W/AUTO DIFF WBC: CPT

## 2023-07-19 PROCEDURE — 36415 COLL VENOUS BLD VENIPUNCTURE: CPT

## 2023-07-19 PROCEDURE — 90471 IMMUNIZATION ADMIN: CPT

## 2023-07-19 PROCEDURE — 99207 PR NO CHARGE NURSE ONLY: CPT

## 2023-07-19 PROCEDURE — 90677 PCV20 VACCINE IM: CPT

## 2023-07-26 LAB — HEMOCCULT STL QL IA: NEGATIVE

## 2023-07-26 PROCEDURE — 82274 ASSAY TEST FOR BLOOD FECAL: CPT

## 2023-10-25 ENCOUNTER — ANCILLARY PROCEDURE (OUTPATIENT)
Dept: GENERAL RADIOLOGY | Facility: CLINIC | Age: 68
End: 2023-10-25
Attending: FAMILY MEDICINE
Payer: COMMERCIAL

## 2023-10-25 ENCOUNTER — OFFICE VISIT (OUTPATIENT)
Dept: FAMILY MEDICINE | Facility: CLINIC | Age: 68
End: 2023-10-25
Payer: COMMERCIAL

## 2023-10-25 VITALS
TEMPERATURE: 97.8 F | WEIGHT: 163 LBS | OXYGEN SATURATION: 98 % | HEIGHT: 62 IN | BODY MASS INDEX: 30 KG/M2 | SYSTOLIC BLOOD PRESSURE: 113 MMHG | HEART RATE: 59 BPM | RESPIRATION RATE: 20 BRPM | DIASTOLIC BLOOD PRESSURE: 78 MMHG

## 2023-10-25 DIAGNOSIS — L71.9 ROSACEA: ICD-10-CM

## 2023-10-25 DIAGNOSIS — Z23 ENCOUNTER FOR IMMUNIZATION: ICD-10-CM

## 2023-10-25 DIAGNOSIS — M17.12 OSTEOARTHRITIS OF LEFT KNEE, UNSPECIFIED OSTEOARTHRITIS TYPE: Primary | ICD-10-CM

## 2023-10-25 DIAGNOSIS — M17.12 OSTEOARTHRITIS OF LEFT KNEE, UNSPECIFIED OSTEOARTHRITIS TYPE: ICD-10-CM

## 2023-10-25 PROCEDURE — 90678 RSV VACC PREF BIVALENT IM: CPT | Performed by: FAMILY MEDICINE

## 2023-10-25 PROCEDURE — 90471 IMMUNIZATION ADMIN: CPT | Performed by: FAMILY MEDICINE

## 2023-10-25 PROCEDURE — 99213 OFFICE O/P EST LOW 20 MIN: CPT | Mod: 25 | Performed by: FAMILY MEDICINE

## 2023-10-25 PROCEDURE — 73562 X-RAY EXAM OF KNEE 3: CPT | Mod: TC | Performed by: RADIOLOGY

## 2023-10-25 PROCEDURE — 90480 ADMN SARSCOV2 VAC 1/ONLY CMP: CPT | Performed by: FAMILY MEDICINE

## 2023-10-25 PROCEDURE — 91320 SARSCV2 VAC 30MCG TRS-SUC IM: CPT | Performed by: FAMILY MEDICINE

## 2023-10-25 RX ORDER — MELOXICAM 7.5 MG/1
7.5 TABLET ORAL DAILY
Qty: 90 TABLET | Refills: 1 | Status: SHIPPED | OUTPATIENT
Start: 2023-10-25 | End: 2024-04-16

## 2023-10-25 NOTE — PROGRESS NOTES
"  Assessment & Plan   Problem List Items Addressed This Visit       Encounter for immunization     Offered         Relevant Orders    RSV VACCINE (ABRYSVO) (Completed)    COVID-19 12+ (2023-24) (PFIZER) (Completed)    Osteoarthritis of left knee, unspecified osteoarthritis type - Primary     Episodic knee pain.  OA diagnosed over a decade ago by x-ray.  Discomfort is intermittent exam without instability, no patellar findings.  Pain at the medial joint line without palpable synovitis.  History suggests Baker's cyst, nonpalpable.  Discussed diagnostics and interventions natural history.  She has been using OTC NSAIDs.  Trial of prescription medications         Relevant Medications    meloxicam (MOBIC) 7.5 MG tablet    Other Relevant Orders    XR Knee Right 3 Views    Rosacea     Current outbreak is unusual.  Continue current regimen observe                      BMI:   Estimated body mass index is 30.3 kg/m  as calculated from the following:    Height as of this encounter: 1.562 m (5' 1.5\").    Weight as of this encounter: 73.9 kg (163 lb).   Weight management plan: Maintain        Raul Newman MD  [facility]    Hermila Kapadia is a 68 year old, presenting for the following health issues:  Musculoskeletal Problem        10/25/2023     4:24 PM   Additional Questions   Roomed by Bhavya DA SILVA       History of Present Illness       Reason for visit:  To check out the pain issue in my left knee  Symptom onset:  More than a month  Symptoms include:  Swelling, pain  Symptom intensity:  Moderate  Symptom progression:  Worsening  Had these symptoms before:  Yes  Has tried/received treatment for these symptoms:  No  What makes it worse:  I don't know. Some days it can be fine, then I wake up and can hardly walk.  What makes it better:  Not really. I try to take 2 Aleve twice daily, but that becomes a timing issue. I also use Voltaren    She eats 2-3 servings of fruits and vegetables daily.She " "consumes 0 sweetened beverage(s) daily.She exercises with enough effort to increase her heart rate 9 or less minutes per day.  She exercises with enough effort to increase her heart rate 3 or less days per week.   She is taking medications regularly.         Pain History:  When did you first notice your pain? 1 month ago    Have you seen anyone else for your pain? No  How has your pain affected your ability to work? Not applicable  Where in your body do you have pain? Musculoskeletal problem/pain  Onset/Duration: 1 month  Description  Location: knee - left  Joint Swelling: YES  Redness: No  Pain: 3/10  Warmth: No  Intensity:  3/10  Progression of Symptoms:  worsening  Accompanying signs and symptoms:   Fevers: No  Numbness/tingling/weakness: No  History  Trauma to the area: No  Recent illness:  No  Previous similar problem: No  Previous evaluation:  No  Precipitating or alleviating factors:  Aggravating factors include: unknown  Therapies tried and outcome: voltrian and otc pain medication, did not help          Review of Systems   No systemic symptoms no falls    Objective    /78 (BP Location: Right arm, Patient Position: Sitting, Cuff Size: Adult Regular)   Pulse 59   Temp 97.8  F (36.6  C) (Oral)   Resp 20   Ht 1.562 m (5' 1.5\")   Wt 73.9 kg (163 lb)   LMP 08/13/2007   SpO2 98%   BMI 30.30 kg/m    Body mass index is 30.3 kg/m .  Physical Exam       As described  Active erythema on nasal tip the right cheek          Raul Newman MD          "

## 2023-10-25 NOTE — PROGRESS NOTES
Answers submitted by the patient for this visit:  General Questionnaire (Submitted on 10/18/2023)  Chief Complaint: Chronic problems general questions HPI Form  How many servings of fruits and vegetables do you eat daily?: 2-3  On average, how many sweetened beverages do you drink each day (Examples: soda, juice, sweet tea, etc.  Do NOT count diet or artificially sweetened beverages)?: 0  How many minutes a day do you exercise enough to make your heart beat faster?: 9 or less  How many days a week do you exercise enough to make your heart beat faster?: 3 or less  How many days per week do you miss taking your medication?: 0  General Concern (Submitted on 10/18/2023)  Chief Complaint: Chronic problems general questions HPI Form  What is the reason for your visit today?: To check out the pain issue in my left knee  When did your symptoms begin?: More than a month  What are your symptoms?: Swelling, pain  How would you describe these symptoms?: Moderate  Are your symptoms:: Worsening  Have you had these symptoms before?: Yes  Have you tried or received treatment for these symptoms before?: No  Is there anything that makes you feel worse?: I don't know. Some days it can be fine, then I wake up and can hardly walk.  Is there anything that makes you feel better?: Not really. I try to take 2 Aleve twice daily, but that becomes a timing issue. I also use Voltaren

## 2023-10-25 NOTE — PROGRESS NOTES
Prior to immunization administration, verified patients identity using patient s name and date of birth. Please see Immunization Activity for additional information.     Screening Questionnaire for Adult Immunization    Are you sick today?   No   Do you have allergies to medications, food, a vaccine component or latex?   Yes   Have you ever had a serious reaction after receiving a vaccination?   No   Do you have a long-term health problem with heart, lung, kidney, or metabolic disease (e.g., diabetes), asthma, a blood disorder, no spleen, complement component deficiency, a cochlear implant, or a spinal fluid leak?  Are you on long-term aspirin therapy?   No   Do you have cancer, leukemia, HIV/AIDS, or any other immune system problem?   No   Do you have a parent, brother, or sister with an immune system problem?   No   In the past 3 months, have you taken medications that affect  your immune system, such as prednisone, other steroids, or anticancer drugs; drugs for the treatment of rheumatoid arthritis, Crohn s disease, or psoriasis; or have you had radiation treatments?   No   Have you had a seizure, or a brain or other nervous system problem?   No   During the past year, have you received a transfusion of blood or blood    products, or been given immune (gamma) globulin or antiviral drug?   No   For women: Are you pregnant or is there a chance you could become       pregnant during the next month?   No   Have you received any vaccinations in the past 4 weeks?   No     Immunization questionnaire answers were all negative, but one. Provider notified.       Patient instructed to remain in clinic for 15 minutes afterwards, and to report any adverse reactions.     Screening performed by Bhavya Cornejo CMA on 10/25/2023 at 5:36 PM.

## 2023-10-26 NOTE — ASSESSMENT & PLAN NOTE
Episodic knee pain.  OA diagnosed over a decade ago by x-ray.  Discomfort is intermittent exam without instability, no patellar findings.  Pain at the medial joint line without palpable synovitis.  History suggests Baker's cyst, nonpalpable.  Discussed diagnostics and interventions natural history.  She has been using OTC NSAIDs.  Trial of prescription medications

## 2024-02-03 ENCOUNTER — MYC MEDICAL ADVICE (OUTPATIENT)
Dept: FAMILY MEDICINE | Facility: CLINIC | Age: 69
End: 2024-02-03
Payer: MEDICARE

## 2024-02-22 ENCOUNTER — MYC MEDICAL ADVICE (OUTPATIENT)
Dept: FAMILY MEDICINE | Facility: CLINIC | Age: 69
End: 2024-02-22
Payer: MEDICARE

## 2024-04-16 DIAGNOSIS — M17.12 OSTEOARTHRITIS OF LEFT KNEE, UNSPECIFIED OSTEOARTHRITIS TYPE: ICD-10-CM

## 2024-04-16 RX ORDER — MELOXICAM 7.5 MG/1
7.5 TABLET ORAL DAILY
Qty: 90 TABLET | Refills: 1 | Status: SHIPPED | OUTPATIENT
Start: 2024-04-16 | End: 2024-05-15

## 2024-05-14 SDOH — HEALTH STABILITY: PHYSICAL HEALTH: ON AVERAGE, HOW MANY MINUTES DO YOU ENGAGE IN EXERCISE AT THIS LEVEL?: PATIENT DECLINED

## 2024-05-14 ASSESSMENT — LIFESTYLE VARIABLES
HOW MANY STANDARD DRINKS CONTAINING ALCOHOL DO YOU HAVE ON A TYPICAL DAY: 1 OR 2
SKIP TO QUESTIONS 9-10: 1
AUDIT-C TOTAL SCORE: 3
HOW OFTEN DO YOU HAVE A DRINK CONTAINING ALCOHOL: 2-3 TIMES A WEEK
HOW OFTEN DO YOU HAVE SIX OR MORE DRINKS ON ONE OCCASION: NEVER

## 2024-05-14 ASSESSMENT — SOCIAL DETERMINANTS OF HEALTH (SDOH)
DO YOU BELONG TO ANY CLUBS OR ORGANIZATIONS SUCH AS CHURCH GROUPS UNIONS, FRATERNAL OR ATHLETIC GROUPS, OR SCHOOL GROUPS?: NO
HOW OFTEN DO YOU GET TOGETHER WITH FRIENDS OR RELATIVES?: PATIENT DECLINED
HOW OFTEN DO YOU ATTEND CHURCH OR RELIGIOUS SERVICES?: 1 TO 4 TIMES PER YEAR
IN A TYPICAL WEEK, HOW MANY TIMES DO YOU TALK ON THE PHONE WITH FAMILY, FRIENDS, OR NEIGHBORS?: MORE THAN THREE TIMES A WEEK
HOW OFTEN DO YOU GET TOGETHER WITH FRIENDS OR RELATIVES?: PATIENT DECLINED
HOW OFTEN DO YOU ATTENT MEETINGS OF THE CLUB OR ORGANIZATION YOU BELONG TO?: PATIENT DECLINED

## 2024-05-15 ENCOUNTER — OFFICE VISIT (OUTPATIENT)
Dept: FAMILY MEDICINE | Facility: CLINIC | Age: 69
End: 2024-05-15
Payer: MEDICARE

## 2024-05-15 VITALS
DIASTOLIC BLOOD PRESSURE: 66 MMHG | WEIGHT: 163.3 LBS | OXYGEN SATURATION: 98 % | RESPIRATION RATE: 16 BRPM | SYSTOLIC BLOOD PRESSURE: 118 MMHG | BODY MASS INDEX: 30.05 KG/M2 | TEMPERATURE: 97.8 F | HEIGHT: 62 IN | HEART RATE: 62 BPM

## 2024-05-15 DIAGNOSIS — Z12.31 ENCOUNTER FOR SCREENING MAMMOGRAM FOR BREAST CANCER: ICD-10-CM

## 2024-05-15 DIAGNOSIS — E03.4 HYPOTHYROIDISM DUE TO ACQUIRED ATROPHY OF THYROID: Primary | ICD-10-CM

## 2024-05-15 DIAGNOSIS — Z13.6 CARDIOVASCULAR SCREENING; LDL GOAL LESS THAN 160: ICD-10-CM

## 2024-05-15 DIAGNOSIS — L71.9 ROSACEA: ICD-10-CM

## 2024-05-15 DIAGNOSIS — G62.9 NEUROPATHY: ICD-10-CM

## 2024-05-15 DIAGNOSIS — Z28.21 COVID-19 VACCINE DOSE DECLINED: ICD-10-CM

## 2024-05-15 DIAGNOSIS — M17.12 OSTEOARTHRITIS OF LEFT KNEE, UNSPECIFIED OSTEOARTHRITIS TYPE: ICD-10-CM

## 2024-05-15 DIAGNOSIS — K21.9 GASTROESOPHAGEAL REFLUX DISEASE, UNSPECIFIED WHETHER ESOPHAGITIS PRESENT: ICD-10-CM

## 2024-05-15 DIAGNOSIS — Z12.11 SPECIAL SCREENING FOR MALIGNANT NEOPLASMS, COLON: ICD-10-CM

## 2024-05-15 DIAGNOSIS — Z00.00 ROUTINE GENERAL MEDICAL EXAMINATION AT A HEALTH CARE FACILITY: ICD-10-CM

## 2024-05-15 PROBLEM — E66.09 NON MORBID OBESITY DUE TO EXCESS CALORIES: Status: RESOLVED | Noted: 2017-07-18 | Resolved: 2024-05-15

## 2024-05-15 PROBLEM — Z23 ENCOUNTER FOR IMMUNIZATION: Status: RESOLVED | Noted: 2023-10-25 | Resolved: 2024-05-15

## 2024-05-15 PROBLEM — Z91.89 PNEUMOCOCCAL VACCINATION INDICATED: Status: RESOLVED | Noted: 2023-04-18 | Resolved: 2024-05-15

## 2024-05-15 PROBLEM — Z78.0 MENOPAUSE: Status: RESOLVED | Noted: 2023-04-18 | Resolved: 2024-05-15

## 2024-05-15 PROBLEM — L98.9 SKIN EROSION: Status: RESOLVED | Noted: 2022-03-09 | Resolved: 2024-05-15

## 2024-05-15 LAB
ALBUMIN SERPL BCG-MCNC: 4.2 G/DL (ref 3.5–5.2)
ALP SERPL-CCNC: 52 U/L (ref 40–150)
ALT SERPL W P-5'-P-CCNC: 28 U/L (ref 0–50)
ANION GAP SERPL CALCULATED.3IONS-SCNC: 8 MMOL/L (ref 7–15)
AST SERPL W P-5'-P-CCNC: 27 U/L (ref 0–45)
BASOPHILS # BLD AUTO: 0 10E3/UL (ref 0–0.2)
BASOPHILS NFR BLD AUTO: 0 %
BILIRUB SERPL-MCNC: 0.3 MG/DL
BUN SERPL-MCNC: 16.5 MG/DL (ref 8–23)
CALCIUM SERPL-MCNC: 9.1 MG/DL (ref 8.8–10.2)
CHLORIDE SERPL-SCNC: 107 MMOL/L (ref 98–107)
CHOLEST SERPL-MCNC: 211 MG/DL
CREAT SERPL-MCNC: 0.9 MG/DL (ref 0.51–0.95)
DEPRECATED HCO3 PLAS-SCNC: 26 MMOL/L (ref 22–29)
EGFRCR SERPLBLD CKD-EPI 2021: 70 ML/MIN/1.73M2
EOSINOPHIL # BLD AUTO: 0.1 10E3/UL (ref 0–0.7)
EOSINOPHIL NFR BLD AUTO: 2 %
ERYTHROCYTE [DISTWIDTH] IN BLOOD BY AUTOMATED COUNT: 12.4 % (ref 10–15)
FASTING STATUS PATIENT QL REPORTED: ABNORMAL
FASTING STATUS PATIENT QL REPORTED: NORMAL
GLUCOSE SERPL-MCNC: 88 MG/DL (ref 70–99)
HCT VFR BLD AUTO: 40.1 % (ref 35–47)
HDLC SERPL-MCNC: 78 MG/DL
HGB BLD-MCNC: 12.9 G/DL (ref 11.7–15.7)
IMM GRANULOCYTES # BLD: 0 10E3/UL
IMM GRANULOCYTES NFR BLD: 0 %
LDLC SERPL CALC-MCNC: 123 MG/DL
LYMPHOCYTES # BLD AUTO: 1.7 10E3/UL (ref 0.8–5.3)
LYMPHOCYTES NFR BLD AUTO: 29 %
MCH RBC QN AUTO: 31.9 PG (ref 26.5–33)
MCHC RBC AUTO-ENTMCNC: 32.2 G/DL (ref 31.5–36.5)
MCV RBC AUTO: 99 FL (ref 78–100)
MONOCYTES # BLD AUTO: 0.3 10E3/UL (ref 0–1.3)
MONOCYTES NFR BLD AUTO: 6 %
NEUTROPHILS # BLD AUTO: 3.8 10E3/UL (ref 1.6–8.3)
NEUTROPHILS NFR BLD AUTO: 63 %
NONHDLC SERPL-MCNC: 133 MG/DL
PLATELET # BLD AUTO: 200 10E3/UL (ref 150–450)
POTASSIUM SERPL-SCNC: 4.2 MMOL/L (ref 3.4–5.3)
PROT SERPL-MCNC: 6.6 G/DL (ref 6.4–8.3)
RBC # BLD AUTO: 4.05 10E6/UL (ref 3.8–5.2)
SODIUM SERPL-SCNC: 141 MMOL/L (ref 135–145)
TRIGL SERPL-MCNC: 51 MG/DL
TSH SERPL DL<=0.005 MIU/L-ACNC: 2.13 UIU/ML (ref 0.3–4.2)
WBC # BLD AUTO: 6 10E3/UL (ref 4–11)

## 2024-05-15 PROCEDURE — 80061 LIPID PANEL: CPT | Performed by: FAMILY MEDICINE

## 2024-05-15 PROCEDURE — 36415 COLL VENOUS BLD VENIPUNCTURE: CPT | Performed by: FAMILY MEDICINE

## 2024-05-15 PROCEDURE — 85025 COMPLETE CBC W/AUTO DIFF WBC: CPT | Performed by: FAMILY MEDICINE

## 2024-05-15 PROCEDURE — G0402 INITIAL PREVENTIVE EXAM: HCPCS | Performed by: FAMILY MEDICINE

## 2024-05-15 PROCEDURE — 84443 ASSAY THYROID STIM HORMONE: CPT | Performed by: FAMILY MEDICINE

## 2024-05-15 PROCEDURE — 99214 OFFICE O/P EST MOD 30 MIN: CPT | Mod: 25 | Performed by: FAMILY MEDICINE

## 2024-05-15 PROCEDURE — 80053 COMPREHEN METABOLIC PANEL: CPT | Performed by: FAMILY MEDICINE

## 2024-05-15 RX ORDER — LEVOTHYROXINE SODIUM 75 UG/1
75 TABLET ORAL DAILY
Qty: 90 TABLET | Refills: 3 | Status: SHIPPED | OUTPATIENT
Start: 2024-05-15

## 2024-05-15 RX ORDER — TOPIRAMATE 100 MG/1
100 TABLET, FILM COATED ORAL DAILY
Qty: 90 TABLET | Refills: 3 | Status: SHIPPED | OUTPATIENT
Start: 2024-05-15

## 2024-05-15 RX ORDER — MELOXICAM 7.5 MG/1
7.5 TABLET ORAL DAILY
Qty: 90 TABLET | Refills: 3 | Status: SHIPPED | OUTPATIENT
Start: 2024-05-15

## 2024-05-15 RX ORDER — DOXYCYCLINE 100 MG/1
100 CAPSULE ORAL DAILY
Qty: 90 CAPSULE | Refills: 3 | Status: SHIPPED | OUTPATIENT
Start: 2024-05-15

## 2024-05-15 NOTE — PROGRESS NOTES
Preventive Care Visit  [facility]  Raul Newman MD, Family Medicine  May 15, 2024      Assessment & Plan   Problem List Items Addressed This Visit       CARDIOVASCULAR SCREENING; LDL GOAL LESS THAN 160     Update database         Relevant Orders    Lipid panel reflex to direct LDL Fasting    COVID-19 vaccine dose declined     Offered.  She is taken aback that the spring vaccination is recommended.  She would prefer to get all her vaccinations in the fall         Encounter for screening mammogram for breast cancer     She is due         Relevant Orders    MA SCREENING DIGITAL BILAT - Future  (s+30)    Esophageal reflux     Most universally quiescent on PPI.  Continue.  Remote EGD reassuring         Relevant Medications    omeprazole (PRILOSEC) 20 MG DR capsule    Other Relevant Orders    CBC with platelets and differential (Completed)    Hypothyroidism - Primary     On repletion.  Yearly TSH         Relevant Medications    levothyroxine (SYNTHROID/LEVOTHROID) 75 MCG tablet    Other Relevant Orders    TSH with free T4 reflex    Neuropathy     Quiescent with current Topamax.  Continue indefinitely         Relevant Medications    topiramate (TOPAMAX) 100 MG tablet    Other Relevant Orders    Comprehensive metabolic panel (BMP + Alb, Alk Phos, ALT, AST, Total. Bili, TP)    Osteoarthritis of left knee, unspecified osteoarthritis type     Occasional discomfort.  Daily meloxicam in maintenance dose in general effective.  No intolerance.  Also improves hand symptoms.  Continue         Relevant Medications    meloxicam (MOBIC) 7.5 MG tablet    Rosacea     Well-controlled with doxycycline.  Capsule is $100 cheaper than tablet.  Change         Relevant Medications    doxycycline hyclate (VIBRAMYCIN) 100 MG capsule    Routine general medical examination at a health care facility    Special screening for malignant neoplasms, colon     She is due         Relevant Orders    Fecal colorectal cancer  "screen (FIT)        Patient has been advised of split billing requirements and indicates understanding: Yes       BMI  Estimated body mass index is 29.87 kg/m  as calculated from the following:    Height as of this encounter: 1.575 m (5' 2\").    Weight as of this encounter: 74.1 kg (163 lb 4.8 oz).   Weight management plan: She is no longer obese.  Positive strokes    Counseling  Appropriate preventive services were discussed with this patient, including applicable screening as appropriate for fall prevention, nutrition, physical activity, Tobacco-use cessation, weight loss and cognition.  Checklist reviewing preventive services available has been given to the patient.  Reviewed patient's diet, addressing concerns and/or questions.           Hermila Kapadia is a 69 year old, presenting for the following:  Physical        5/15/2024     8:16 AM   Additional Questions   Roomed by Bhavya DA SILVA        Health Care Directive  Patient does not have a Health Care Directive or Living Will: Discussed advance care planning with patient; however, patient declined at this time.    HPI              5/14/2024   General Health   How would you rate your overall physical health? Excellent   Feel stress (tense, anxious, or unable to sleep) Not at all    Not at all         5/14/2024   Nutrition   Diet: Regular (no restrictions)         5/14/2024   Exercise   Days per week of moderate/strenous exercise Patient declined    Patient declined   Average minutes spent exercising at this level Patient declined    Patient declined         5/14/2024   Social Factors   Frequency of gathering with friends or relatives Patient declined    Patient declined   Worry food won't last until get money to buy more No    No   Food not last or not have enough money for food? No    No   Do you have housing?  Yes    Yes   Are you worried about losing your housing? No    No   Lack of transportation? No    No   Unable to get utilities (heat,electricity)? No    No "         5/15/2024   Fall Risk   Fallen 2 or more times in the past year? No   Trouble with walking or balance? No          2024   Activities of Daily Living- Home Safety   Needs help with the following daily activites None of the above   Safety concerns in the home None of the above         2024   Dental   Dentist two times every year? Yes         2024   Hearing Screening   Hearing concerns? None of the above         2024   Driving Risk Screening   Patient/family members have concerns about driving No         2024   General Alertness/Fatigue Screening   Have you been more tired than usual lately? No         2024   Urinary Incontinence Screening   Bothered by leaking urine in past 6 months No         2024   TB Screening   Were you born outside of the [place]? No         Today's PHQ-2 Score:       2024    11:57 AM   PHQ-2 (  [employer])   Q1: Little interest or pleasure in doing things 0   Q2: Feeling down, depressed or hopeless 0   PHQ-2 Score 0   Q1: Little interest or pleasure in doing things Not at all   Q2: Feeling down, depressed or hopeless Not at all   PHQ-2 Score 0           2024   Substance Use   Alcohol more than 3/day or more than 7/wk No   Do you have a current opioid prescription? No   How severe/bad is pain from 1 to 10? 0/10 (No Pain)   Do you use any other substances recreationally? No     Social History     Tobacco Use    Smoking status: Former     Current packs/day: 0.00     Average packs/day: 0.5 packs/day for 7.0 years (3.5 ttl pk-yrs)     Types: Cigarettes     Start date: 1970     Quit date: 1977     Years since quittin.4     Passive exposure: Past    Smokeless tobacco: Never    Tobacco comments:     quit    Vaping Use    Vaping status: Never Used   Substance Use Topics    Alcohol use: Yes     Comment: socially    Drug use: No           2022   LAST FHS-7 RESULTS   1st degree relative breast or ovarian cancer No    No   Any relative  bilateral breast cancer No    No   Any male have breast cancer No    No   Any ONE woman have BOTH breast AND ovarian cancer Yes    No   Any woman with breast cancer before 50yrs Yes    No   2 or more relatives with breast AND/OR ovarian cancer Yes    No   2 or more relatives with breast AND/OR bowel cancer No    No              History of abnormal Pap smear: No - age 65 or older with adequate negative prior screening test results (3 consecutive negative cytology results, 2 consecutive negative cotesting results, or 2 consecutive negative HrHPV test results within 10 years, with the most recent test occurring within the recommended screening interval for the test used)        Latest Ref Rng & Units 10/27/2020     4:11 PM 10/27/2020     3:30 PM 7/13/2015     7:14 PM   PAP / HPV   PAP (Historical)  NIL      HPV 16 DNA NEG^Negative  Negative  Negative    HPV 18 DNA NEG^Negative  Negative  Negative    Other HR HPV NEG^Negative  Negative  Negative      ASCVD Risk   The 10-year ASCVD risk score (David RODRIGUEZ, et al., 2019) is: 5.8%    Values used to calculate the score:      Age: 69 years      Sex: Female      Is Non- : No      Diabetic: No      Tobacco smoker: No      Systolic Blood Pressure: 118 mmHg      Is BP treated: No      HDL Cholesterol: 54 mg/dL      Total Cholesterol: 188 mg/dL            Reviewed and updated as needed this visit by Provider           Sexual Activity            Current providers sharing in care for this patient include:  Patient Care Team:  Raul Newman MD as PCP - General (Family Practice)  Raul Newman MD as Assigned PCP    The following health maintenance items are reviewed in Epic and correct as of today:  Health Maintenance   Topic Date Due    COVID-19 Vaccine (7 - 2023-24 season) 02/25/2024    MAMMO SCREENING  04/11/2024    MEDICARE ANNUAL WELLNESS VISIT  04/18/2024    DTAP/TDAP/TD IMMUNIZATION (2 - Td or Tdap) 06/30/2024    CMP  07/19/2024    TSH  "W/FREE T4 REFLEX  07/19/2024    COLORECTAL CANCER SCREENING  07/26/2024    LIPID  08/05/2024    CBC W/DIFFERENTIAL  05/15/2025    ANNUAL REVIEW OF HM ORDERS  05/15/2025    FALL RISK ASSESSMENT  05/15/2025    DEXA  07/12/2026    GLUCOSE  07/19/2026    ADVANCE CARE PLANNING  05/15/2029    HEPATITIS C SCREENING  Completed    PHQ-2 (once per calendar year)  Completed    INFLUENZA VACCINE  Completed    Pneumococcal Vaccine: 65+ Years  Completed    ZOSTER IMMUNIZATION  Completed    RSV VACCINE (Pregnancy & 60+)  Completed    IPV IMMUNIZATION  Aged Out    HPV IMMUNIZATION  Aged Out    MENINGITIS IMMUNIZATION  Aged Out    RSV MONOCLONAL ANTIBODY  Aged Out    PAP  Discontinued    LUNG CANCER SCREENING  Discontinued         Review of Systems  Constitutional, neuro, ENT, endocrine, pulmonary, cardiac, gastrointestinal, genitourinary, musculoskeletal, integument and psychiatric systems are negative, except as otherwise noted.     Objective    Exam  /66 (BP Location: Right arm, Patient Position: Sitting, Cuff Size: Adult Regular)   Pulse 62   Temp 97.8  F (36.6  C) (Oral)   Resp 16   Ht 1.575 m (5' 2\")   Wt 74.1 kg (163 lb 4.8 oz)   LMP 08/13/2007   SpO2 98%   BMI 29.87 kg/m     Estimated body mass index is 29.87 kg/m  as calculated from the following:    Height as of this encounter: 1.575 m (5' 2\").    Weight as of this encounter: 74.1 kg (163 lb 4.8 oz).    Physical Exam  Constitutional:       Appearance: Normal appearance.   HENT:      Head: Atraumatic.      Right Ear: Tympanic membrane normal.      Left Ear: Tympanic membrane normal.      Nose: Nose normal.      Mouth/Throat:      Mouth: Mucous membranes are moist.   Eyes:      Pupils: Pupils are equal, round, and reactive to light.   Cardiovascular:      Rate and Rhythm: Normal rate and regular rhythm.      Heart sounds: Normal heart sounds.   Pulmonary:      Effort: Pulmonary effort is normal.      Breath sounds: Normal breath sounds.   Abdominal:      " General: Bowel sounds are normal.      Palpations: Abdomen is soft.   Musculoskeletal:      Cervical back: Neck supple.      Right lower leg: No edema.      Left lower leg: No edema.   Skin:     General: Skin is warm and dry.   Neurological:      General: No focal deficit present.      Mental Status: She is alert.   Psychiatric:         Mood and Affect: Mood normal.                5/15/2024   Mini Cog   Clock Draw Score 2 Normal   3 Item Recall 3 objects recalled   Mini Cog Total Score 5         Vision Screen  Reason Vision Screen Not Completed: Patient had exam in last 12 months      Signed Electronically by: Raul Newman MD

## 2024-05-16 NOTE — ASSESSMENT & PLAN NOTE
Offered.  She is taken aback that the spring vaccination is recommended.  She would prefer to get all her vaccinations in the fall

## 2024-05-16 NOTE — ASSESSMENT & PLAN NOTE
Occasional discomfort.  Daily meloxicam in maintenance dose in general effective.  No intolerance.  Also improves hand symptoms.  Continue

## 2024-05-17 PROCEDURE — 82274 ASSAY TEST FOR BLOOD FECAL: CPT | Performed by: FAMILY MEDICINE

## 2024-05-20 LAB — HEMOCCULT STL QL IA: NEGATIVE

## 2024-05-31 ENCOUNTER — ANCILLARY PROCEDURE (OUTPATIENT)
Dept: MAMMOGRAPHY | Facility: CLINIC | Age: 69
End: 2024-05-31
Attending: FAMILY MEDICINE
Payer: MEDICARE

## 2024-05-31 DIAGNOSIS — Z12.31 VISIT FOR SCREENING MAMMOGRAM: ICD-10-CM

## 2024-05-31 PROCEDURE — 77063 BREAST TOMOSYNTHESIS BI: CPT | Mod: TC | Performed by: RADIOLOGY

## 2024-05-31 PROCEDURE — 77067 SCR MAMMO BI INCL CAD: CPT | Mod: TC | Performed by: RADIOLOGY

## 2024-09-06 ENCOUNTER — ALLIED HEALTH/NURSE VISIT (OUTPATIENT)
Dept: RESEARCH | Facility: CLINIC | Age: 69
End: 2024-09-06
Payer: MEDICARE

## 2024-09-06 VITALS
OXYGEN SATURATION: 98 % | HEIGHT: 61 IN | BODY MASS INDEX: 32.13 KG/M2 | RESPIRATION RATE: 17 BRPM | HEART RATE: 49 BPM | SYSTOLIC BLOOD PRESSURE: 119 MMHG | DIASTOLIC BLOOD PRESSURE: 74 MMHG | WEIGHT: 170.19 LBS

## 2024-09-06 DIAGNOSIS — Z00.6 RESEARCH STUDY PATIENT: Primary | ICD-10-CM

## 2024-09-06 PROCEDURE — 99207 PR NO CHARGE-RESEARCH SERVICE: CPT

## 2024-09-06 NOTE — PROGRESS NOTES
[place] Study Consent    Study Description: The purpose of this study is to explore potential relationships between physiologic parameters collected from sensor data with physiological changes potentially induced by the administration of the COVID-19 vaccine.    Steffi Klein a 69 year old female, was on-site today at [facility] [place] to discuss participation for [place] (-[id number])       The consent form was reviewed with the patient.     The review of the study included:  Study Purpose      Participant Duration, Responsibilities & Expectations    Study Data and Devices    Benefits and Risks of Participation    Compensation and Costs of Participation    Coded Study Data  Voluntary Participation    Study Restrictions  Confidentiality Obligations/Privacy-Related Risks   Injury, Legal, and Data Rights    Authorization to Use and Disclose Your Protected Health Information    Protocol Version: 3.0   Principle Investigator: Chau Soto MD    Subject Number: [id number]      The subject was queried in regards to her willingness to continue and her questions were answered to her satisfaction. The patient has given her agreement to volunteer and participate in the above noted study.     The eConsent and HIPAA form version (Version 3.0 Date 09-AUG-2024) was Signed: signed on  06-SEP-2024 with the Clinical Research Unit of [facility] [place].     A copy of the [place] consent will be placed in subject's medical record. A copy of the consent form was given to the subject today.    Study data is directly entered into Epic and [employer] per protocol. No study procedures were done prior to Steffi Klein providing informed consent.       06-SEP-2024    Mel Melgoza

## 2024-09-06 NOTE — PROGRESS NOTES
[place] Screening Study Note  Study Description: The purpose of this study is to explore potential relationships between physiologic parameters collected from sensor data with physiological changes potentially induced by the administration of the COVID-19 vaccine.       Subject ID:      Demographic Info  Steffi Klein   1955          69 year old    SCREENING   Sex: Female  Pregnancy Screening   Surgically Sterile: No  Over 55 years of age and have not had a menstrual cycle for >2 years: Yes  Last Menstrual Period Date: 13AUG2007       Multi Racial?: No;   Ethnicity: Ethnicity: Not  or      Medical/Surgical Conditions:   Has the subject experienced any relevant past and/or concomitant Medical History (e.g., chronic conditions such as hypertension, cardiovascular disease, stroke, diabetes, kidney disease, peripheral arterial disease, Raynaud's syndrome, and any relevant surgical history?  Yes  Past Medical History:    Diagnosis Date End Date   1 Anxiety 12/28/2009, Ongoing Ongoing   2 Esophageal reflux Reflux Late Entry Note: Writer transcription error. Corrected Type of reflux [id number]. TDN 7/2/2006 Ongoing   3 Hypotension, unspecified hypotension type 7/9/2018 Ongoing   4 Hypothyroidism 7/2/2014 Ongoing   5 Neuropathy 6/30/2014 Ongoing    R foot post op     6 Obesity 7/13/2015 Ongoing   7 Onychomycosis 12/14/2017 Ongoing   8 JEROD (obstructive sleep apnea) 10/30/2018 Ongoing   9 Osteoarthritis of left knee, unspecified osteoarthritis type 10/25/2023 Ongoing   10 Face Rosacea (Face only) 7/13/2015 Ongoing   11 Squamous cell carcinoma of shoulder, left 10/09/2009 UNK/UNK/2009       Add 'Ongoing' or end date to medical condition as applicable    Concomitant Medications:      Review of your medicines     Medication Name (Generic) Class Start Date Ongoing? Dose Unit Frequency Route Indication   levothyroxine  Other 02-JUL-2014 Yes 75  mcg QD Oral Con Med Cond: Hypothyroidism  "  topiramate  Other 30-JUN-2014 Yes 100 mcg QD Oral Con Med Cond:  Neuropathy   ZyrTEC  Other 30-JUN-2014 Yes 10 mg PRN Oral Con Med Cond:  Face Rosacea    omeprazole Other 02-JUL-2006 Yes 20 mg QD} Oral Con Med Cond: Reflux   doxycycline hyclate  Other 13-JUL-2015 Yes 100 mg QD Oral Con Med Cond: Face Rosacea    Late Entry Note: Writer transcription error. Corrected Med Indication [id number]. TDN  Allergies:   Does the subject have any known allergies to medications, food, a vaccine component, or latex? Yes  Allergies   Allergen Reactions    Pcn [Penicillins] Rash     Gets a lot of yeast build-up when on antibiotics (02Mar2010, Ongoing)          Subject Characteristics     Vitals  /74 (BP Location: Left arm, Patient Position: Sitting, Cuff Size: Adult Large)   Pulse (!) 49   Resp 17   Ht 1.549 m (5' 1\")   Wt 77.2 kg (170 lb 3.1 oz)   LMP 08/13/2007 (Approximate)   SpO2 98%   Breastfeeding No   BMI 32.16 kg/m         Trimble Scale:  Wrist Circumference: Study Watch Wrist Preferred Watch Wrist Dominant Hand Watch Band Tightness:   3 16 cm Left Left Right Secure      Watch Size Preference: 45mm    ENROLLMENT    Was the visit performed? Yes   Date of Enrollment: 06-SEP-2024. All procedures below occurred on this day.    Site Zip Code: Site Time Zone:  Site Location: Protocol Assigned: Eligibility Confirmed:   [address] [place] Protocol A (COVID) Yes   Plan for Study Kit #1 Delivery: Given to Participant On-Site     [place] Device Accountability Prepped/Dispensed  Prepped & Dispensed Study Kit #1:  All Study Devices included? Yes (including Study Watch, Study Phone, Ambient Sensor, Oral Thermometer and Charging Accessories)  Is this a Replacement Kit? No    Study Phone  ID [employer] ID [place] ID    [id number]     Study Watch  ID Model  Band Type    [id number] Series 9 Sport Loop   Late Entry Note: Writer transcription error. Corrected Study Watch ID [id number]. TDN    Was Study Kit #1 " Shipped to the Participant? No  Were all expected devices received? Yes  Were there Device Issues? No  Was the Study Kit Replaced? No    All devices listed above were dispensed to the participant. Device ID were confirmed prior to dispensation.      Participant was thoroughly educated on study procedure and device care, staff highlighted the importance of compliance to study procedure. All questions and concerns were addressed, and informed participant to contact study coordinators for any questions.     Adverse Events Summary:*   Were any Adverse Events (AEs) experienced? No    Protocol Deviation Summary:*   Were there any Protocol Deviations?: No    *If Yes, please complete corresponding form.    Concomitant Medications:  As screening and enrollment appointments occurred on the same day, please refer to the concomitant medications documented above.        06-SEP-2024   Mel Teixeira

## 2024-09-06 NOTE — PROGRESS NOTES
[place] Inclusion/Exclusion Criteria:    Study Name: [place] (-[id number])      : Chau Soto MD      Study Description: The purpose of this study is to explore potential relationships between physiologic parameters collected from sensor data with physiological changes potentially induced by the administration of the COVID-19 vaccine.     Protocol Version: 3.0 (Version Date: 19-JUL-2024) Consent Version: 3.0 (Version Date: 09-AUG-2024)    Inclusion #  Inclusion Criteria (ALL MUST BE YES)  YES/NO/N/A   1 Be at least 18 years old  Yes   2 Proficient in written and spoken English, defined by self-report   Yes   3 Willing and able to participate in the study procedures and data consent described in the consent form   Yes   4 Able to communicate effectively with and follow instructions from the Study Team    Yes   5   Eligible to receive the updated COVID-19 vaccine based on current CDC recommendations and vaccine prescribing information. (As determined by [facility]) Yes   6   Able to disclose home address to a healthcare provider or Study Team member to enable (a) device shipping (if necessary) and (b) a 911 call in case of potential emergency (home address will not be kept as study data)  Yes   7    Able to adhere to Lifestyle Considerations (see Section 5.3) throughout study duration (as applicable). These include avoiding taking certain over-the-counter pain relievers or fever reducing medications around the time of vaccination, not taking any recreational drugs (e.g. methamphetamines, cocaine, opioids, cannabis, LSD)  within 72 hours prior to, during and after the ingestible temperature sensor monitoring period; and abstaining from strenuous exercise, ingestion of hot or cold liquids, eating food, chewing gum or mints, brushing teeth or smoking 30 minutes before taking oral temperature measurements.  Yes   8   Participant has their own reliable high-speed broadband internet at their home and  "active at the time of data collection  Yes   9   Have a personal computer, desktop, laptop, tablet, or smartphone with audiovisual capabilities Yes   If any inclusion criteria marked \"No\" please provide detail (If all Yes, N/A): N/A        Exclusion # Exclusion Criteria (ALL MUST BE NO) YES/NO/N/A   1 Participants with tattoos, skin problems or wound(s) on/in the wrist or deltoid (ex: injured or friable skin, skin disorders, or allergic skin reactions, such as eczema, rosacea, impetigo, dermatomyositis, or allergic contact dermatitis), that can interfere with study setup/assessments/vaccination  No   2 Individuals who are pregnant or plan to become pregnant during the study  No   3 Anything that may interfere with proper physiological data acquisition, such as an implantable device (e.g., cardiac pacemaker, automated implantable cardioverter-defibrillator, deep brain stimulator, Inspire upper airway stimulation device) and casts or body braces No   4 Participants that are diagnosed or are suspected to have illnesses affecting motion: e.g., Parkinson's, Essential Tremor, Dystonia, or others at investigator's discretion No   5 Participants that are diagnosed with a condition or taking a medication that impairs the immune system (i.e., active cancer, HIV/AIDS, organ/stem cell transplant recipient, autoimmune disorders, primary immunodeficiencies) No   6 Participants with any medical history, vital sign, or any other study procedure finding/assessment that in the opinion of the investigator could compromise participant safety during study participation or interfere with the study integrity and/or the accurate assessment of the study objectives No   7 Daily use of OTC or prescription medications with antipyretic properties at time of enrollment that is anticipated to continue during the CBT sensor data collection period surrounding administration of vaccines. Low dose aspirin (81 mg or less per day) taken for " "preventative purposes is permissible No   8 Individuals who are unwilling to avoid taking OTC pain relievers and anti-pyrectics for acute mild to moderate pain and fever associated with vaccine administration during the data collection days surrounding its administration No   9 Participant works for a company that develops or sells medical and/or fitness devices (e.g., ECG monitors, wearable fitness bands, sleep monitors, etc.) or are technology journalists (e.g., professional bloggers, TV, magazine, newspaper reporters, etc.) No   10 Overnight travel or travel between time zones planned during CBT sensor data collection nights No   11 Participants with planned overnight travel totaling ? 7 nights during duration of study data collection period No   12 Participant plans on moving or changing address within the study period No   13 Participant is employed in overnight shift work, or otherwise does not maintain a reasonably consistent day/night schedule (e.g., participants who are unable to regularly go to bed between 7pm to 2am and wake up between 4am to 12pm on average ? 3 times a week) No   14 Participants with clinically relevant sleep disturbances and unable to achieve at least 4 hours of continuous sleep on average each night No   If any exclusion criteria marked \"Yes\" please provide detail (If all No, N/A): N/A    Exclusion (a) # Exclusion criteria related to the COVID-19 vaccine:   (ALL MUST BE NO) YES/NO/N/A   1 Participants with a known history of a severe allergic reaction (e.g., anaphylaxis) to any component of the COVID-19 vaccine. No   2 Participants who experienced severe side effects following previous administration of the COVID-19 vaccine including myocarditis, pericarditis, thrombosis, or thrombocytopenia No   3 Participants in whom an additional COVID-19 vaccine is contraindicated. No   If any exclusion criteria marked \"Yes\" please provide detail (If all No, N/A): N/A    Exclusion (b) # Exclusion " criteria related to Ingestible Temperature Sensor:   (ALL MUST BE NO) YES/NO/N/A   1 Participants under the age of 18 No   2 Participant weighs less than 40 kg (88 lbs.) or BMI greater than 44.6 No   3 Participants who are pregnant N/A   4 Participants with a known diagnosis of obstructive disease of the gastrointestinal tract or a known hernia, Crohn's disease, diverticulitis, or other inflammatory bowel disease. No   5 Participants with a 1st degree relative with any inflammatory bowel disease with suspected hereditary transmission (e.g., Crohn's disease, or ulcerative colitis) No   6 Participants with known history of disordered or impaired gag reflex  No   7 Participants who have problems swallowing food or pills (e.g., dysphagia) No   8 Participants with previous gastrointestinal tract surgery involving the esophagus, stomach, or intestines, excluding intraluminal endoscopy. No   9 Participants with known diagnosis of hypo-motility disorders of the gastrointestinal tract (including chronic constipation with fewer than three spontaneous bowel movements per week No   10 Participants with chronic diarrhea, as defined by 3 or more episodes of diarrhea per week for the last 30 days or ? 3 bowel movements per day No   11 Participants with known diagnosis of felinization of the esophagus (unusual folding of the esophagus)  No   12 Participants with Zenker's diverticulum (a pouch that forms in the upper part of the esophagus) and people with a history of other diverticula.  No   13 Participants who may undergo NMR or MRI scanning within one week of CBT sensor ingestion No   14 Participants with an implantable pulse generator or implantable electro-medical device of any kind (e.g., pacemakers (or implantable pulse generators), implantable cardioverter defibrillators (ICDs), deep brain stimulation (DBS) devices, and left ventricular assist devices (LVADs). No   15 Participants with an implanted or temporarily implanted  "device that uses an external power-source. No   If any exclusion criteria marked \"Yes\" please provide detail (If all No, N/A): N/A    Will the participant continue in the study? Yes  (If \"No\", follow instructions for handling of Screen Failures)    If the participant is eligible to continue in the study, inclusion/exclusion criteria above will be sent to the PI for co-sign.    Enrollment Date:  06-SEP-2024      MD Mel Covarrubias   "

## 2024-09-06 NOTE — PROGRESS NOTES
"  [place] Study Physical Exam  Study Description: The purpose of this study is to explore potential relationships between physiologic parameters collected from sensor data with physiological changes potentially induced by the administration of the COVID-19 vaccine.     Medical History Reviewed? Yes    Physical Examination  For abnormal findings, please evaluate if the finding is Clinically Significant (by 'CS') or Not Clinically Significant (by 'NCS')  General Appearance   Normal  Head and Neck   Normal  Lungs     Normal  Cardiovascular   Normal  Abdomen    Normal  Musculoskeletal/Extremities  Normal   Lymph Nodes    Normal  Skin     Normal  Neurological    Normal    Tremor (If present document)  Absent    Vitals:    09/06/24 1012   BP: 119/74   BP Location: Left arm   Patient Position: Sitting   Cuff Size: Adult Large   Pulse: (!) 49   Resp: 17   SpO2: 98%   Weight: 77.2 kg (170 lb 3.1 oz)   Height: 1.549 m (5' 1\")              Immunization History   Administered Date(s) Administered    COVID-19 12+ (Pfizer) 10/25/2023    COVID-19 Bivalent 12+ (Pfizer) 09/21/2022    COVID-19 MONOVALENT 12+ (Pfizer) 02/03/2021, 02/24/2021, 12/04/2021    COVID-19 Monovalent 12+ (Pfizer 2022) 05/28/2022    Flu, Unspecified 09/15/2020, 10/07/2021, 10/12/2023    Influenza (IIV3) PF 10/12/2021    Influenza Vaccine 18-64 (Flublok) 10/29/2018    Influenza Vaccine 65+ (FLUAD) 10/26/2022, 10/12/2023    Influenza Vaccine >6 months,quad, PF 10/10/2013, 10/13/2014, 09/24/2015, 10/13/2016, 11/04/2017, 09/28/2019    Pneumococcal 20 valent Conjugate (Prevnar 20) 07/19/2023    RSV Vaccine (Abrysvo) 10/25/2023    TD,PF 7+ (Tenivac) 04/25/2006    TDAP Vaccine (Adacel) 06/30/2014    Zoster recombinant adjuvanted (SHINGRIX) 08/05/2019, 10/06/2019    Zoster vaccine, live 01/31/2015       Have you had any serious issues with previous Covid-19 immunizations? No  COVID Vaccine Screening   Have you received a dose of the Covid-19 vaccine before?   Yes, " Pfizer  Date of most recent Covid-19 vaccine dose: 10/25/2023      Do you currently have a health condition or are undergoing    treatment that makes you moderately to severely immunocompromised?* No  Have you ever had an allergic reaction to a Covid vaccine?**  No  Have you ever had an allergic reaction to another vaccine or injectable  medication?         No  Have you ever felt dizzy or faint before, during or after a shot?   No    *Ex: treatment of cancer, HIV, organ transplant recipient, immunosuppressive therapy, etc.     **This would include a severe allergic reaction (e.g., anaphylaxis that required treatment with epinephrine or caused you to go to the hospital. It would also include an allergic reaction that caused hives, swelling, or respiratory distress, including wheezing)    Is this subject eligible to receive a Covid-19 vaccine? Yes    06-SEP-2024    Yahaira Barrientos NP

## 2024-09-19 ENCOUNTER — TELEPHONE (OUTPATIENT)
Dept: RESEARCH | Facility: CLINIC | Age: 69
End: 2024-09-19
Payer: MEDICARE

## 2024-09-19 DIAGNOSIS — Z00.6 RESEARCH STUDY PATIENT: Primary | ICD-10-CM

## 2024-09-19 NOTE — TELEPHONE ENCOUNTER
[place] Unscheduled Visit  Study Description: The purpose of this study is to explore potential relationships between physiologic parameters collected from sensor data with physiological changes potentially induced by the administration of the COVID-19 vaccine.       Subject ID:      Type of Visit: Phone Call and a onsite visit   Visit Date: 19-SEP-2024    Reason for Unscheduled Visit: Compliance/ Technical Issue and a Device Swap   The participant reported multiple device issues, including her phone not following the correct sleep schedule and now her oral thermometer will not pair to her study phone. Multiple attempts of troubleshooting by the participant and this coordinator were unsuccessful and thus the subject returned to the study site to receive a new thermometer. That was successfully paired. The subject asked other questions and they were answered. The subject also noted her phone and watch have been updated to stickapps. Sponsor was made aware and as long as their data is still coming through it is okay to proceed with the original devices. Given all the weird device behavior, a Sysdiagnose was collected and the subject signed the supplementary Sysdiagnose consent form, which has been saved to the participant's electronic medical record.       Participant was thoroughly educated on study procedure and device care, staff highlighted the importance of compliance to study procedure. All questions and concerns were addressed, and informed participant to call study coordinators for any questions.     Adverse Events Summary:*   Were any Adverse Events (AEs) experienced? No    Protocol Deviation Summary:*   Were there any Protocol Deviations?: No    *If Yes, please complete corresponding form.    Concomitant Medications Summary:    Has the subject taken any medications within 30 days prior to signing the informed consent and/or during the study? (Have they started any new medications?) Yes but their  medications have not changed since their screening and enrollment visit. Please refer to that documentation for the medication list.       19-SEP-2024  Ingris Hollins

## 2024-09-23 ENCOUNTER — TELEPHONE (OUTPATIENT)
Dept: RESEARCH | Facility: CLINIC | Age: 69
End: 2024-09-23

## 2024-09-23 DIAGNOSIS — Z00.6 RESEARCH STUDY PATIENT: Primary | ICD-10-CM

## 2024-09-23 NOTE — TELEPHONE ENCOUNTER
[place] Pre-Pill Day Call/Virtual Visit 1 Study Note    Study Description: The purpose of this study is to explore potential relationships between physiologic parameters collected from sensor data with physiological changes potentially induced by the administration of the COVID-19 vaccine.       Subject ID: [id number]     Virtual Check In Visit 1  Time of Visit (24H): 13:57    Date/Time of Onsite Visit 1 / Pill Appointment Confirmed? Yes    Has this subject completed 4 consecutive compliant days? Yes, proceed with Onsite Visit 1    Reminders  [x] Check compliance and address any issues  [x] Are you having any issues with your study devices? Yes: Devices not working and device were checked on 09/23 with an on-site visit.  [x] Continue to follow nightly/daily study procedures   Please bring your Study Watch, Study Phone, and Subject Instructions to your next appointment.     Additional Notes: Additional Notes: Subject called and appointment was cancel because of a study pause.  Subject will be contacted when the study opens again to enrollment.      Adverse Events Summary:*   Were any Adverse Events (AEs) experienced? No    Protocol Deviation Summary:*   Were there any Protocol Deviations?: No    *If Yes, please complete corresponding form.    Concomitant Medications Summary:    Has the subject taken any medications within 30 days prior to signing the informed consent and/or during the study? (Have they started any new medications since the screening/enrollment visit?) Yes but their medications have not changed since their screening and enrollment visit. Please refer to that documentation for the medication list.       23-SEP-2024     Anne Anderson

## 2024-10-02 ENCOUNTER — TELEPHONE (OUTPATIENT)
Dept: RESEARCH | Facility: CLINIC | Age: 69
End: 2024-10-02
Payer: MEDICARE

## 2024-10-02 NOTE — TELEPHONE ENCOUNTER
[place] Unscheduled Visit  Study Description: The purpose of this study is to explore potential relationships between physiologic parameters collected from sensor data with physiological changes potentially induced by the administration of the COVID-19 vaccine.       Subject ID:      Type of Visit: Phone Call  Visit Date: 02-OCT-2024    Reason for Unscheduled Visit:  Writer spoke with the participant regarding new protocol clarification letter. Rescheduled vaccination appointment per protocol.     Adverse Events Summary:*   Were any Adverse Events (AEs) experienced? No    Protocol Deviation Summary:*   Were there any Protocol Deviations?: No    *If Yes, please complete corresponding form.    Concomitant Medications Summary:    Has the subject taken any medications within 30 days prior to signing the informed consent and/or during the study? (Have they started any new medications?) Yes but their medications have not changed since their screening and enrollment visit. Please refer to that documentation for the medication list.     02-SEP-2024  Luci Urena RN

## 2024-10-08 ENCOUNTER — VIRTUAL VISIT (OUTPATIENT)
Dept: RESEARCH | Facility: CLINIC | Age: 69
End: 2024-10-08
Payer: MEDICARE

## 2024-10-08 DIAGNOSIS — Z00.6 RESEARCH STUDY PATIENT: Primary | ICD-10-CM

## 2024-10-08 PROCEDURE — 99207 PR NO CHARGE NURSE ONLY: CPT | Mod: 93

## 2024-10-08 NOTE — PROGRESS NOTES
[place] Pre-Vaccine Call / Virtual Visit 2 Study Note    Study Description: The purpose of this study is to explore potential relationships between physiologic parameters collected from sensor data with physiological changes potentially induced by the administration of the COVID-19 vaccine.       Subject ID:      Was the visit performed? Yes  Location:     Virtual Visit 2: 8-OCT-2024  Time of Visit (24H): 09:30    Date/Time of Onsite Visit 2 / Vaccine Appointment Confirmed? Yes    Reminders  [x] Check compliance and address any issues   -If there are pending [employer] uploads, bring gateway close to the phone  [x] Are you having any issues with your study devices? No  [x] Continue to follow nightly/daily study procedures   [x] Are you sick today or experiencing any cold/flu-like symptoms? No     -If yes, investigate if rescheduling vaccine appointment is required  [x] Please remember to ingest Pill #3 tonight/the evening before your vaccine appointment     Additional Notes: N/A      CBT Pill Ingestion Log     Sponsor has requested the ingestion of the CBT sensor portion of the study protocol procedures to be stopped as they continue to clarify the risks related to the likelihood of any subsequent monitoring or testing with the CBT sensors. The Sponsor also specified that currently enrolled study participants are to continue with all other study procedures, including Covid-19 vaccination, without protocol deviation. This is made clear in the Protocol Clarification Letter written on 30-Sep-2024 and IRB approved on 01-Oct-2024    Due to this procotol clarification, the subject has not ingested any CBT sensors and thus has generated no data related to the CBT sensors.     Adverse Events Summary:*   Were any Adverse Events (AEs) experienced? No    Protocol Deviation Summary:*   Were there any Protocol Deviations?: No    *If Yes, please complete corresponding form.    Concomitant Medications Summary:    Has the  subject taken any medications within 30 days prior to signing the informed consent and/or during the study?   Yes but their medications have not changed since their screening and enrollment visit. Please refer to that documentation for the medication list.       8-OCT-2024     Ingris Hollins

## 2024-10-09 ENCOUNTER — ALLIED HEALTH/NURSE VISIT (OUTPATIENT)
Dept: RESEARCH | Facility: CLINIC | Age: 69
End: 2024-10-09
Payer: MEDICARE

## 2024-10-09 DIAGNOSIS — Z23 HIGH PRIORITY FOR 2019-NCOV VACCINE: ICD-10-CM

## 2024-10-09 DIAGNOSIS — Z00.6 EXAMINATION OF PARTICIPANT OR CONTROL IN CLINICAL RESEARCH: Primary | ICD-10-CM

## 2024-10-09 PROCEDURE — 99207 PR NO CHARGE NURSE ONLY: CPT

## 2024-10-09 PROCEDURE — 90480 ADMN SARSCOV2 VAC 1/ONLY CMP: CPT

## 2024-10-09 PROCEDURE — 91320 SARSCV2 VAC 30MCG TRS-SUC IM: CPT

## 2024-10-09 NOTE — PROGRESS NOTES
[place] Vaccine Day /On-Site Visit 2 Note  Study Description: The purpose of this study is to explore potential relationships between physiologic parameters collected from sensor data with physiological changes potentially induced by the administration of the COVID-19 vaccine.    Subject ID: [id number]     Steffi Klein presents to [facility] for On-Site 2 on 9-OCT-2024. All procedures below occurred on this day.      On-Site Visit 2                                                                Was the visit performed?: Yes    Was the visit performed? Yes  Height/Weight and BMI confirmed? Yes    Pregnancy Test Needed? No    CBT Ingestion Log and CBT Device Kit Accountability     At the time of this appointment, Sponsor has requested the ingestion of the CBT sensor portion of the study protocol procedures to be stopped as they continue to clarify the risks related to the likelihood of any subsequent monitoring or testing with the CBT sensors. The Sponsor also specified that currently enrolled study participants are to continue with all other study procedures, including Covid-19 vaccination, without protocol deviation. This is made clear in the Protocol Clarification Letter written on 30-Sep-2024 and IRB approved on 01-Oct-2024.       Due to this protocol clarification, no CBT sensors were dispensed to the participant and no CBT sensors were ingested by the participants. There was no ingestion data generated for this participant.     However, the participant still received their Covid-19 vaccination, per the protocol.     Please review the provider note for vaccine eligibility screening and administration information.     Adverse Events Summary:*   Were any Adverse Events (AEs) experienced? No    Protocol Deviation Summary:*   Were there any Protocol Deviations?: No    *If Yes, please complete corresponding form.    Concomitant Medications Summary:    Has the subject taken any medications within 30 days  prior to signing the informed consent and/or during the study? (Have they started any new medications since the screening/enrollment visit?) Yes but their medications have not changed since their screening and enrollment visit. Please refer to that documentation for the medication list.       9-OCT-2024   Ingris Hollins

## 2024-10-09 NOTE — PROGRESS NOTES
[place] Vaccine Screening & Administration Note:  Study Description: The purpose of this study is to explore potential relationships between physiologic parameters collected from sensor data with physiological changes potentially induced by the administration of the COVID-19 vaccine.    Subject ID:      I saw Steffi Klein today to review their eligibility for Covid-19 vaccination and administer the vaccine when appropriate. Vaccine information sheet (VIS) was provided to the participant.     COVID Vaccine Screening   Are you sick today or experiencing any cold/flu-like symptoms?   No  Have you received a dose of the Covid-19 vaccine before?   Yes, Pfizer  Date of most recent Covid-19 vaccine dose:     25-October-2023   Do you currently have a health condition or are undergoing    treatment that makes you moderately to severely immunocompromised?* No  Have you ever had an allergic reaction to a Covid vaccine?**  No  Have you ever had an allergic reaction to another vaccine or injectable  medication?         No  Have you ever felt dizzy or faint before, during or after a shot?   No    *Ex: treatment of cancer, HIV, organ transplant recipient, immunosuppressive therapy, etc.     **This would include a severe allergic reaction (e.g., anaphylaxis that required treatment with epinephrine or caused you to go to the hospital. It would also include an allergic reaction that caused hives, swelling, or respiratory distress, including wheezing)    Vaccine Administration      Was a vaccine administered?  Yes  5 Rights of Dosing Confirmed?  Yes   -The Right patient?   -The Right drug?   -The Right time?    -The Right dose?   -The Right route?    Vaccine Administration Information  Administration Date Administration Time Initials of  Initials of Vaccine Verifier    10/09/24  10:31 MR  LZ     Covid-19 Vaccine Information    Lot Number Expiration Route Arm    Pfizer [id number]  15-March-2025  Intramuscular Left   Updated vaccination card and AVS were provided to the participant.    I examined the participant 15 minutes after administration. There were no complications like shortness of breath, throat/chest tightness, sore throat, wheezing or generalized itching. Injection site looks fine without redness, induration or swelling. Participant is able to proceed with study procedures.        09-OCT-2024   Kelsey Graves PA-C

## 2024-10-11 ENCOUNTER — VIRTUAL VISIT (OUTPATIENT)
Dept: RESEARCH | Facility: CLINIC | Age: 69
End: 2024-10-11
Payer: MEDICARE

## 2024-10-11 DIAGNOSIS — Z00.6 RESEARCH STUDY PATIENT: Primary | ICD-10-CM

## 2024-10-11 PROCEDURE — 99207 PR NO CHARGE NURSE ONLY: CPT | Mod: 93

## 2024-10-11 NOTE — PROGRESS NOTES
[place] Post-VD Call / Virtual Visit 3 Note    Study Description: The purpose of this study is to explore potential relationships between physiologic parameters collected from sensor data with physiological changes potentially induced by the administration of the COVID-19 vaccine.       Subject ID:      Was the visit performed? Yes  Location:     Virtual Visit 3: 11-OCT-2024  Time of Visit (24H): 11:27      Reminders  [x] Check compliance and address any issues   -If there are pending Igloo uploads, bring gateway close to the phone  [x] Are you having any issues with your study devices? No   [x] Continue to follow nightly/daily study procedures  [x] Are you sick today or experiencing any cold/flu-like symptoms? No    Additional Notes:  N/A    CBT Pill Ingestion Log       Adverse Events Summary:*   Were any Adverse Events (AEs) experienced? No    Protocol Deviation Summary:*   Were there any Protocol Deviations?: No    *If Yes, please complete corresponding form.    Concomitant Medications Summary:    Has the subject taken any medications within 30 days prior to signing the informed consent and/or during the study? Yes but their medications have not changed since their screening and enrollment visit. Please refer to that documentation for the medication list.       11-OCT-2024     Ingris Hollins

## 2024-10-15 ENCOUNTER — TELEPHONE (OUTPATIENT)
Dept: RESEARCH | Facility: CLINIC | Age: 69
End: 2024-10-15
Payer: MEDICARE

## 2024-10-15 DIAGNOSIS — Z00.6 EXAMINATION OF PARTICIPANT OR CONTROL IN CLINICAL RESEARCH: Primary | ICD-10-CM

## 2024-10-15 NOTE — TELEPHONE ENCOUNTER
[place] Unscheduled Visit  Study Description: The purpose of this study is to explore potential relationships between physiologic parameters collected from sensor data with physiological changes potentially induced by the administration of the COVID-19 vaccine.       Subject ID:      Type of Visit: Phone Call  Visit Date: 15-OCT-2024    Reason for Unscheduled Visit: Compliance/Technical Assistance: Called due to the Ambient sensor no longer transmitting data, The subject was spoken to over the phone and will force close igloo and reopen the christine prior to completing the evening surveys for the remainder of the study.     Adverse Events Summary:*   Were any Adverse Events (AEs) experienced? No    Protocol Deviation Summary:*   Were there any Protocol Deviations?: No    *If Yes, please complete corresponding form.    Concomitant Medications Summary:    Has the subject taken any medications within 30 days prior to signing the informed consent and/or during the study? (Have they started any new medications?) Yes but their medications have not changed since their screening and enrollment visit. Please refer to that documentation for the medication list.     15-OCT-2024  Pk Hendrix

## 2024-10-16 ENCOUNTER — TELEPHONE (OUTPATIENT)
Dept: RESEARCH | Facility: CLINIC | Age: 69
End: 2024-10-16
Payer: MEDICARE

## 2024-10-16 DIAGNOSIS — Z00.6 RESEARCH SUBJECT: Primary | ICD-10-CM

## 2024-10-16 NOTE — TELEPHONE ENCOUNTER
[place] Study Note    Study Description: The purpose of this study is to explore potential relationships between physiologic parameters collected from sensor data with physiological changes potentially induced by the administration of the COVID-19 vaccine.    Subject Number: [id number]    Steffi Klein a 69 year old female, was called today to inform them of the new version of the consent form has been approved by the IRB. The changes, such as updated risks and data collected as associated with the CBT smart sensor and ingestion phase study procedure changes, were discussed with the participant Call/VM: over the phone.     As this participant has passed the ingestion phase portion of their study participation, none of the updated consent sections are not particularly pertinent to the remainder of their study participation. Therefore they will sign the updated consent form at their next scheduled on-site visit.     All participants questions were answered and they are still willing to participate in the study.       16-OCT-2024    Anne Anderson

## 2024-10-21 ENCOUNTER — VIRTUAL VISIT (OUTPATIENT)
Dept: RESEARCH | Facility: CLINIC | Age: 69
End: 2024-10-21
Payer: MEDICARE

## 2024-10-21 DIAGNOSIS — Z00.6 RESEARCH SUBJECT: Primary | ICD-10-CM

## 2024-10-21 PROCEDURE — 99207 PR NO CHARGE NURSE ONLY: CPT | Mod: 93

## 2024-10-21 NOTE — PROGRESS NOTES
[place] [facility] Call / Virtual Visit 4 Note    Study Description: The purpose of this study is to explore potential relationships between physiologic parameters collected from sensor data with physiological changes potentially induced by the administration of the COVID-19 vaccine.       Subject ID:     Was the visit performed? Yes    Reminders  [x] Check compliance and address any issues   -If there are pending Igloo uploads, bring gateway close to the phone  [x] Are you having any issues with your study devices? No   [x] New Procedures   -For the next 4 days only do the morning survey and leave devices on the   [x] If you have not removed your No MRI bracelet by now, please do so  [x] Confirm end of study visit      Additional Notes: N/A    CBT Pill Ingestion Log - Didn't take any smart pills due to study pause     For [facility] Unscheduled Visit for this Call:   Subject was instructed on the procedures for the last 4 days of study participation. Device issues and compliance were reviewed and addressed. The end of study appointment date and time were confirmed. Subject was told to bring their study devices with them.     Adverse Events Summary:*   Were any Adverse Events (AEs) experienced? No    Protocol Deviation Summary:*   Were there any Protocol Deviations?: No    *If Yes, please complete corresponding form.    Concomitant Medications Summary:    Has the subject taken any medications within 30 days prior to signing the informed consent and/or during the study? Yes but their medications have not changed since their screening and enrollment visit. Please refer to that documentation for the medication list.       21-OCT-2024   Anne Anderson

## 2024-10-25 ENCOUNTER — ALLIED HEALTH/NURSE VISIT (OUTPATIENT)
Dept: RESEARCH | Facility: CLINIC | Age: 69
End: 2024-10-25
Payer: MEDICARE

## 2024-10-25 DIAGNOSIS — Z00.6 EXAMINATION OF PARTICIPANT OR CONTROL IN CLINICAL RESEARCH: Primary | ICD-10-CM

## 2024-10-25 PROCEDURE — 99207 PR NO CHARGE NURSE ONLY: CPT

## 2024-10-25 NOTE — PROGRESS NOTES
[place] End of Study Note  -Including Device Accountability Returned and Subject Disposition    Study Description: The purpose of this study is to explore potential relationships between physiologic parameters collected from sensor data with physiological changes potentially induced by the administration of the COVID-19 vaccine.       Subject ID:        Was the visit performed?  Yes   Was Study Exit Survey Completed on the study phone?: Yes    Subject Disposition:  Did the subject complete the study? Yes   If no, Why? N/A    Which Visit did the participant exit from the study? End of Study Visit  Study Completion Date: 25-OCT-2024      [place] Device Accountability Returned Form    Was the Device Kit Returned? Yes   Date Device Kit Returned:  25-OCT-2024   Were There Device Issues?  No   Was the Phone Returned?   Returned Phone ID: Yes  [id number]   Was the Watch Returned?   Returned Watch ID: Yes  [id number]   Was the [place] Returned?   Returned [place] ID: No  No Pills dispensed   Were All Chargers and Accessories Returned?  Yes        Adverse Events Summary:*   Were any Adverse Events (AEs) experienced? No    Protocol Deviation Summary:*   Were there any Protocol Deviations?: No    *If Yes, please complete corresponding form.    Concomitant Medications Summary:    Has the subject taken any medications within 30 days prior to signing the informed consent and/or during the study? (Have they started any new medications? Yes but their medications have not changed since their screening and enrollment visit. Please refer to that documentation for the medication list.     25-OCT-2024   MONSTER Portillo

## 2024-10-25 NOTE — PROGRESS NOTES
[place] Study Reconsent    Study Description: The purpose of this study is to explore potential relationships between physiologic parameters collected from sensor data with physiological changes potentially induced by the administration of the COVID-19 vaccine.    Steffi Klein a 69 year old female, was on-site today at [facility] for a study visit for the [place] (-[id number]) study.       The new version of the consent form was reviewed with the patient.     The review of the new version included:  Study Purpose      Participant Duration, Responsibilities & Expectations    Study Data and Devices - How many smart pills will be ingested  Benefits and Risks of Participation - Additional risks associated with the ingestible sensors  Compensation and Costs of Participation    Coded Study Data - Additional information   Voluntary Participation    Study Restrictions  Confidentiality Obligations/Privacy-Related Risks   Injury, Legal, and Data Rights    Authorization to Use and Disclose Your Protected Health Information    Protocol Version: 4.0   Principle Investigator: Chau Soto MD    Subject Number: [id number]     The reconsent eConsent and HIPAA form version (Version 4.0 Date 09-OCT-2024) was signed on  25-OCT-2024. The updated copy of the consent form was provided to the participant and saved in the participant's medical record alongside the original consent.     All participants questions were answered and they are still willing to participate in the study.       25-OCT-2024    MONSTER Portillo

## 2024-12-26 ENCOUNTER — PATIENT OUTREACH (OUTPATIENT)
Dept: CARE COORDINATION | Facility: CLINIC | Age: 69
End: 2024-12-26
Payer: MEDICARE

## 2025-04-23 ENCOUNTER — PATIENT OUTREACH (OUTPATIENT)
Dept: CARE COORDINATION | Facility: CLINIC | Age: 70
End: 2025-04-23
Payer: MEDICARE

## 2025-04-24 DIAGNOSIS — Z12.11 COLON CANCER SCREENING: ICD-10-CM

## 2025-05-01 ENCOUNTER — PATIENT OUTREACH (OUTPATIENT)
Dept: CARE COORDINATION | Facility: CLINIC | Age: 70
End: 2025-05-01
Payer: MEDICARE

## 2025-05-08 ENCOUNTER — ORDERS ONLY (AUTO-RELEASED) (OUTPATIENT)
Dept: URGENT CARE | Facility: CLINIC | Age: 70
End: 2025-05-08
Payer: MEDICARE

## 2025-05-08 DIAGNOSIS — Z12.11 COLON CANCER SCREENING: ICD-10-CM

## 2025-05-18 SDOH — HEALTH STABILITY: PHYSICAL HEALTH: ON AVERAGE, HOW MANY DAYS PER WEEK DO YOU ENGAGE IN MODERATE TO STRENUOUS EXERCISE (LIKE A BRISK WALK)?: 0 DAYS

## 2025-05-18 SDOH — HEALTH STABILITY: PHYSICAL HEALTH: ON AVERAGE, HOW MANY MINUTES DO YOU ENGAGE IN EXERCISE AT THIS LEVEL?: 0 MIN

## 2025-05-18 ASSESSMENT — SOCIAL DETERMINANTS OF HEALTH (SDOH): HOW OFTEN DO YOU GET TOGETHER WITH FRIENDS OR RELATIVES?: PATIENT DECLINED

## 2025-05-19 ENCOUNTER — ANCILLARY PROCEDURE (OUTPATIENT)
Dept: GENERAL RADIOLOGY | Facility: CLINIC | Age: 70
End: 2025-05-19
Payer: MEDICARE

## 2025-05-19 ENCOUNTER — OFFICE VISIT (OUTPATIENT)
Dept: FAMILY MEDICINE | Facility: CLINIC | Age: 70
End: 2025-05-19
Payer: MEDICARE

## 2025-05-19 VITALS
DIASTOLIC BLOOD PRESSURE: 75 MMHG | TEMPERATURE: 98.4 F | RESPIRATION RATE: 12 BRPM | OXYGEN SATURATION: 95 % | HEIGHT: 61 IN | WEIGHT: 172.3 LBS | HEART RATE: 71 BPM | BODY MASS INDEX: 32.53 KG/M2 | SYSTOLIC BLOOD PRESSURE: 117 MMHG

## 2025-05-19 DIAGNOSIS — E03.9 ACQUIRED HYPOTHYROIDISM: ICD-10-CM

## 2025-05-19 DIAGNOSIS — Z00.00 ENCOUNTER FOR MEDICARE ANNUAL WELLNESS EXAM: Primary | ICD-10-CM

## 2025-05-19 DIAGNOSIS — Z23 NEED FOR VACCINATION: ICD-10-CM
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
## 2025-05-19 DIAGNOSIS — K21.9 GASTROESOPHAGEAL REFLUX DISEASE, UNSPECIFIED WHETHER ESOPHAGITIS PRESENT: ICD-10-CM

## 2025-05-19 DIAGNOSIS — L71.9 ROSACEA: ICD-10-CM

## 2025-05-19 DIAGNOSIS — M17.12 OSTEOARTHRITIS OF LEFT KNEE, UNSPECIFIED OSTEOARTHRITIS TYPE: ICD-10-CM

## 2025-05-19 DIAGNOSIS — G62.9 NEUROPATHY: ICD-10-CM

## 2025-05-19 DIAGNOSIS — Z12.31 ENCOUNTER FOR SCREENING MAMMOGRAM FOR MALIGNANT NEOPLASM OF BREAST: ICD-10-CM

## 2025-05-19 DIAGNOSIS — E78.2 MIXED HYPERLIPIDEMIA: ICD-10-CM

## 2025-05-19 DIAGNOSIS — Z12.11 SPECIAL SCREENING FOR MALIGNANT NEOPLASMS, COLON: ICD-10-CM

## 2025-05-19 LAB
BASOPHILS # BLD AUTO: 0 10E3/UL (ref 0–0.2)
BASOPHILS NFR BLD AUTO: 0 %
EOSINOPHIL # BLD AUTO: 0.1 10E3/UL (ref 0–0.7)
EOSINOPHIL NFR BLD AUTO: 1 %
ERYTHROCYTE [DISTWIDTH] IN BLOOD BY AUTOMATED COUNT: 13 % (ref 10–15)
HCT VFR BLD AUTO: 38.9 % (ref 35–47)
HGB BLD-MCNC: 12.7 G/DL (ref 11.7–15.7)
IMM GRANULOCYTES # BLD: 0 10E3/UL
IMM GRANULOCYTES NFR BLD: 0 %
LYMPHOCYTES # BLD AUTO: 2.6 10E3/UL (ref 0.8–5.3)
LYMPHOCYTES NFR BLD AUTO: 36 %
MCH RBC QN AUTO: 32 PG (ref 26.5–33)
MCHC RBC AUTO-ENTMCNC: 32.6 G/DL (ref 31.5–36.5)
MCV RBC AUTO: 98 FL (ref 78–100)
MONOCYTES # BLD AUTO: 0.4 10E3/UL (ref 0–1.3)
MONOCYTES NFR BLD AUTO: 6 %
NEUTROPHILS # BLD AUTO: 4.1 10E3/UL (ref 1.6–8.3)
NEUTROPHILS NFR BLD AUTO: 57 %
PLATELET # BLD AUTO: 221 10E3/UL (ref 150–450)
RBC # BLD AUTO: 3.97 10E6/UL (ref 3.8–5.2)
WBC # BLD AUTO: 7.1 10E3/UL (ref 4–11)

## 2025-05-19 PROCEDURE — G0438 PPPS, INITIAL VISIT: HCPCS

## 2025-05-19 PROCEDURE — G2211 COMPLEX E/M VISIT ADD ON: HCPCS

## 2025-05-19 PROCEDURE — 99214 OFFICE O/P EST MOD 30 MIN: CPT | Mod: 25

## 2025-05-19 PROCEDURE — 80061 LIPID PANEL: CPT

## 2025-05-19 PROCEDURE — 3074F SYST BP LT 130 MM HG: CPT

## 2025-05-19 PROCEDURE — 85025 COMPLETE CBC W/AUTO DIFF WBC: CPT

## 2025-05-19 PROCEDURE — 84443 ASSAY THYROID STIM HORMONE: CPT

## 2025-05-19 PROCEDURE — 80053 COMPREHEN METABOLIC PANEL: CPT

## 2025-05-19 PROCEDURE — 3078F DIAST BP <80 MM HG: CPT

## 2025-05-19 PROCEDURE — 73562 X-RAY EXAM OF KNEE 3: CPT | Mod: TC | Performed by: STUDENT IN AN ORGANIZED HEALTH CARE EDUCATION/TRAINING PROGRAM

## 2025-05-19 PROCEDURE — 36415 COLL VENOUS BLD VENIPUNCTURE: CPT

## 2025-05-19 RX ORDER — DOXYCYCLINE 100 MG/1
100 CAPSULE ORAL DAILY
Qty: 90 CAPSULE | Refills: 3 | Status: SHIPPED | OUTPATIENT
Start: 2025-05-19

## 2025-05-19 RX ORDER — OMEPRAZOLE 20 MG/1
20 CAPSULE, DELAYED RELEASE ORAL DAILY
Qty: 90 CAPSULE | Refills: 3 | Status: SHIPPED | OUTPATIENT
Start: 2025-05-19

## 2025-05-19 RX ORDER — MELOXICAM 7.5 MG/1
7.5 TABLET ORAL DAILY
Qty: 90 TABLET | Refills: 3 | Status: SHIPPED | OUTPATIENT
Start: 2025-05-19

## 2025-05-19 RX ORDER — TOPIRAMATE 100 MG/1
100 TABLET, FILM COATED ORAL DAILY
Qty: 90 TABLET | Refills: 3 | Status: SHIPPED | OUTPATIENT
Start: 2025-05-19

## 2025-05-19 NOTE — PROGRESS NOTES
Preventive Care Visit  [facility]  Rozina VictorinoameliaKASI CNP, Family Medicine  May 19, 2025      Assessment & Plan     (Z00.00) Encounter for Medicare annual wellness exam  (primary encounter diagnosis)  Comment: Reviewed health maintenance/screening services guidelines/recommendations as well as vaccination recommendations as indicated which Steffi understands. Services ordered after shared decision making agreed upon. Recommended healthy habits/diet and exercise.    Plan: MA Screen Bilateral w/Casey    (E03.9) Acquired hypothyroidism  Comment: Chronic medical condition due for monitoring.   Plan: COMPREHENSIVE METABOLIC PANEL, CBC with         Platelets & Differential, TSH WITH FREE T4         REFLEX    (E78.2) Mixed hyperlipidemia  Comment: Chronic medical condition due for monitoring. Not currently medicated. Recommend continuing to work on therapeutic lifestyle changes including a heart healthy diet, regular physical activity and weight management/loss efforts.    Plan: COMPREHENSIVE METABOLIC PANEL, Lipid panel         reflex to direct LDL Non-fasting    (Z23) Need for vaccination  Comment: Needs to be completed at pharmacy per Medicare coverage.  Plan: Tdap, tetanus-diptheria-acell pertussis,         (BOOSTRIX) 5-2.5-18.5 LF-MCG/0.5 MICHELLE injection    (Z12.11) Special screening for malignant neoplasms, colon  Plan: Colonoscopy Screening  Referral    (M17.12) Osteoarthritis of left knee, unspecified osteoarthritis type  Comment: Chronic stable medical condition; continue present management on current treatment regimen of meloxicam 7.5 mg daily.   Plan: meloxicam (MOBIC) 7.5 MG tablet    (K21.9) Gastroesophageal reflux disease, unspecified whether esophagitis present  Comment: Chronic stable medical condition; continue present management on current treatment regimen of omeprazole 20 mg daily; refill provided.   Plan: omeprazole (PRILOSEC) 20 MG DR capsule    (G62.9)  "Neuropathy  Comment: Chronic stable medical condition; continue present management on current treatment regimen of topiramate.   Plan: topiramate (TOPAMAX) 100 MG tablet    (L71.9) Rosacea  Comment: Chronic stable medical condition; continue present management on current treatment regimen of doxycycline 100 mg daily; refill provided. .   Plan: doxycycline hyclate (VIBRAMYCIN) 100 MG capsule    (Z12.31) Encounter for screening mammogram for malignant neoplasm of breast  Plan: MA Screen Bilateral w/Casey    (W19.XXXA) Fall, initial encounter  Comment: Right knee pain s/p mechanical fall x 2 weeks ago. Patient continues to have right patellar pain with palpable abnormality; small moveable. Will check x-ray today. Will consider referral to ortho.   Plan: XR Knee Right 3 Views         BMI  Estimated body mass index is 32.56 kg/m  as calculated from the following:    Height as of this encounter: 1.549 m (5' 1\").    Weight as of this encounter: 78.2 kg (172 lb 4.8 oz).   Weight management plan: Discussed healthy diet and exercise guidelines    Counseling  Appropriate preventive services were addressed with this patient via screening, questionnaire, or discussion as appropriate for fall prevention, nutrition, physical activity, Tobacco-use cessation, social engagement, weight loss and cognition.  Checklist reviewing preventive services available has been given to the patient.  Reviewed patient's diet, addressing concerns and/or questions.     The longitudinal plan of care for the diagnosis(es)/condition(s) as documented were addressed during this visit. Due to the added complexity in care, I will continue to support Steffi in the subsequent management and with ongoing continuity of care.    Subjective   Steffi is a 70 year old, presenting for the following:  Physical        5/19/2025     4:44 PM   Additional Questions   Roomed by yi GONSALVES  History of Present Illness-  Steffi Klein, a 70-year-old female, " reports having a fall two Fridays ago, which resulted in a right knee injury. She describes tenderness and unusual movement in the knee, which she finds concerning. She also experienced a severe sinus infection three weeks ago, which lasted for two weeks, likely exacerbated by the hot weather and her seasonal allergies. She takes Zyrtec daily to manage her allergies, which are currently severe.    She has a history of hypothyroidism, which was acquired. Her current medications include levothyroxine, meloxicam for arthritis, omeprazole, topiramate, and Doxycycline for rosacea. She previously lost 60 lbs with the Livea program in 2019, going from 196 lbs to 138 lbs. However, she has experienced some weight gain recently due to family circumstances and is currently working on losing weight again. 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     Advance Care Planning    Discussed advance care planning with patient; however, patient declined at this time.        5/18/2025   General Health   How would you rate your overall physical health? Excellent   Feel stress (tense, anxious, or unable to sleep) Not at all         5/18/2025   Nutrition   Diet: Other   If other, please elaborate: I am doing Livea right now         5/18/2025   Exercise   Days per week of moderate/strenous exercise 0 days   Average minutes spent exercising at this level 0 min   (!) EXERCISE CONCERN      5/18/2025   Social Factors   Frequency of gathering with friends or relatives Patient declined   Worry food won't last until get money to buy more No   Food not last or not have enough money for food? No   Do you have housing? (Housing is defined as stable permanent housing and does not include staying outside in a car, in a tent, in an abandoned building, in an overnight shelter, or couch-surfing.) Yes   Are you worried about losing your housing? No   Lack of transportation? No    Unable to get utilities (heat,electricity)? No         2025   Fall Risk   Gait Speed Test (Document in seconds) 4    4   Gait Speed Test Interpretation Less than or equal to 5.00 seconds - PASS    Less than or equal to 5.00 seconds - PASS       Multiple values from one day are sorted in reverse-chronological order          2025   Activities of Daily Living- Home Safety   Needs help with the following daily activites None of the above   Safety concerns in the home None of the above         2025   Dental   Dentist two times every year? Yes         2025   Hearing Screening   Hearing concerns? None of the above         2025   Driving Risk Screening   Patient/family members have concerns about driving No         2025   General Alertness/Fatigue Screening   Have you been more tired than usual lately? No         2025   Urinary Incontinence Screening   Bothered by leaking urine in past 6 months No         Today's PHQ-2 Score:       2025     8:00 AM   PHQ-2 (  [employer])   Q1: Little interest or pleasure in doing things 0   Q2: Feeling down, depressed or hopeless 0   PHQ-2 Score 0    Q1: Little interest or pleasure in doing things Not at all   Q2: Feeling down, depressed or hopeless Not at all   PHQ-2 Score 0       Patient-reported           2025   Substance Use   Alcohol more than 3/day or more than 7/wk No   Do you have a current opioid prescription? No   How severe/bad is pain from 1 to 10? 2/10   Do you use any other substances recreationally? No     Social History     Tobacco Use    Smoking status: Former     Current packs/day: 0.00     Average packs/day: 0.5 packs/day for 7.0 years (3.5 ttl pk-yrs)     Types: Cigarettes     Start date: 1970     Quit date: 1977     Years since quittin.4     Passive exposure: Past    Smokeless tobacco: Never    Tobacco comments:     quit    Vaping Use    Vaping status: Never Used   Substance Use Topics    Alcohol use:  Yes     Comment: socially    Drug use: No           5/31/2024   LAST FHS-7 RESULTS   1st degree relative breast or ovarian cancer No   Any relative bilateral breast cancer No   Any male have breast cancer No   Any ONE woman have BOTH breast AND ovarian cancer No   Any woman with breast cancer before 50yrs Unknown   2 or more relatives with breast AND/OR ovarian cancer No   2 or more relatives with breast AND/OR bowel cancer No        Mammogram Screening - Mammogram every 1-2 years updated in Health Maintenance based on mutual decision making      History of abnormal Pap smear: No - age 65 or older with adequate negative prior screening test results (3 consecutive negative cytology results, 2 consecutive negative cotesting results, or 2 consecutive negative HrHPV test results within 10 years, with the most recent test occurring within the recommended screening interval for the test used)        Latest Ref Rng & Units 10/27/2020     4:11 PM 10/27/2020     3:30 PM 7/13/2015     7:14 PM   PAP / HPV   PAP (Historical)  NIL      HPV 16 DNA NEG^Negative  Negative  Negative    HPV 18 DNA NEG^Negative  Negative  Negative    Other HR HPV NEG^Negative  Negative  Negative      ASCVD Risk   The 10-year ASCVD risk score (David RODRIGUEZ, et al., 2019) is: 6%    Values used to calculate the score:      Age: 70 years      Sex: Female      Is Non- : No      Diabetic: No      Tobacco smoker: No      Systolic Blood Pressure: 117 mmHg      Is BP treated: No      HDL Cholesterol: 78 mg/dL      Total Cholesterol: 211 mg/dL          Reviewed and updated as needed this visit by Provider   Tobacco  Allergies  Meds  Problems  Med Hx  Surg Hx  Fam Hx     Sexual Activity        Current providers sharing in care for this patient include:  Patient Care Team:  Rozina Salazar APRN CNP as PCP - General (Family Medicine)  Clinic - [place] as Assigned PCP    The following [facility]  "items are reviewed in Epic and correct as of today:  Health Maintenance   Topic Date Due    DTAP/TDAP/TD IMMUNIZATION (2 - Td or Tdap) 06/30/2024    COVID-19 Vaccine (8 - 2024-25 season) 04/09/2025    CMP  05/15/2025    LIPID  05/15/2025    COLORECTAL CANCER SCREENING  05/17/2025    TSH W/FREE T4 REFLEX  05/15/2025    MEDICARE ANNUAL WELLNESS VISIT  05/19/2026    CBC W/DIFFERENTIAL  05/19/2026    ANNUAL REVIEW OF HM ORDERS  05/19/2026    FALL RISK ASSESSMENT  05/19/2026    MAMMO SCREENING  05/31/2026    DEXA  07/12/2026    DIABETES SCREENING  05/15/2027    ADVANCE CARE PLANNING  05/15/2029    HEPATITIS C SCREENING  Completed    PHQ-2 (once per calendar year)  Completed    INFLUENZA VACCINE  Completed    Pneumococcal Vaccine: 50+ Years  Completed    ZOSTER IMMUNIZATION  Completed    RSV VACCINE  Completed    HPV IMMUNIZATION  Aged Out    MENINGITIS IMMUNIZATION  Aged Out    PAP  Discontinued    LUNG CANCER SCREENING  Discontinued          Objective    Exam  /75 (BP Location: Right arm, Patient Position: Sitting, Cuff Size: Adult Large)   Pulse 71   Temp 98.4  F (36.9  C) (Oral)   Resp 12   Ht 1.549 m (5' 1\")   Wt 78.2 kg (172 lb 4.8 oz)   LMP 08/13/2007 (Approximate)   SpO2 95%   BMI 32.56 kg/m     Estimated body mass index is 32.56 kg/m  as calculated from the following:    Height as of this encounter: 1.549 m (5' 1\").    Weight as of this encounter: 78.2 kg (172 lb 4.8 oz).    Physical Exam  GENERAL: alert and no distress  EYES: Eyes grossly normal to inspection, PERRL and conjunctivae and sclerae normal  HENT: ear canals and TM's normal, nose and mouth without ulcers or lesions  NECK: no adenopathy, no asymmetry, masses, or scars  RESP: lungs clear to auscultation - no rales, rhonchi or wheezes  BREAST: normal without masses, tenderness or nipple discharge and no palpable axillary masses or adenopathy  CV: regular rate and rhythm, normal S1 S2, no S3 or S4, no murmur, click or rub, no peripheral " edema  ABDOMEN: soft, nontender, no hepatosplenomegaly, no masses and bowel sounds normal  ORTHO: Knee Exam: Inspection: Right knee bruising and pain with palpation.   SKIN: no suspicious lesions or rashes  NEURO: Normal strength and tone, mentation intact and speech normal  PSYCH: mentation appears normal, affect normal/bright         5/19/2025   Mini Cog   Clock Draw Score 2 Normal   3 Item Recall 3 objects recalled   Mini Cog Total Score 5             5/15/2024   Vision Screen   Reason Vision Screen Not Completed Patient had exam in last 12 months        Data saved with a previous flowsheet row definition       Signed Electronically by: KASI Ponce CNP

## 2025-05-19 NOTE — PATIENT INSTRUCTIONS
Patient Education   Preventive Care Advice   This is general advice given by our system to help you stay healthy. However, your care team may have specific advice just for you. Please talk to your care team about your preventive care needs.  Nutrition  Eat 5 or more servings of fruits and vegetables each day.  Try wheat bread, brown rice and whole grain pasta (instead of white bread, rice, and pasta).  Get enough calcium and vitamin D. Check the label on foods and aim for 100% of the RDA (recommended daily allowance).  Lifestyle  Exercise at least 150 minutes each week  (30 minutes a day, 5 days a week).  Do muscle strengthening activities 2 days a week. These help control your weight and prevent disease.  No smoking.  Wear sunscreen to prevent skin cancer.  Have a dental exam and cleaning every 6 months.  Yearly exams  See your health care team every year to talk about:  Any changes in your health.  Any medicines your care team has prescribed.  Preventive care, family planning, and ways to prevent chronic diseases.  Shots (vaccines)   HPV shots (up to age 26), if you've never had them before.  Hepatitis B shots (up to age 59), if you've never had them before.  COVID-19 shot: Get this shot when it's due.  Flu shot: Get a flu shot every year.  Tetanus shot: Get a tetanus shot every 10 years.  Pneumococcal, hepatitis A, and RSV shots: Ask your care team if you need these based on your risk.  Shingles shot (for age 50 and up)  General health tests  Diabetes screening:  Starting at age 35, Get screened for diabetes at least every 3 years.  If you are younger than age 35, ask your care team if you should be screened for diabetes.  Cholesterol test: At age 39, start having a cholesterol test every 5 years, or more often if advised.  Bone density scan (DEXA): At age 50, ask your care team if you should have this scan for osteoporosis (brittle bones).  Hepatitis C: Get tested at least once in your life.  STIs (sexually  transmitted infections)  Before age 24: Ask your care team if you should be screened for STIs.  After age 24: Get screened for STIs if you're at risk. You are at risk for STIs (including HIV) if:  You are sexually active with more than one person.  You don't use condoms every time.  You or a partner was diagnosed with a sexually transmitted infection.  If you are at risk for HIV, ask about PrEP medicine to prevent HIV.  Get tested for HIV at least once in your life, whether you are at risk for HIV or not.  Cancer screening tests  Cervical cancer screening: If you have a cervix, begin getting regular cervical cancer screening tests starting at age 21.  Breast cancer scan (mammogram): If you've ever had breasts, begin having regular mammograms starting at age 40. This is a scan to check for breast cancer.  Colon cancer screening: It is important to start screening for colon cancer at age 45.  Have a colonoscopy test every 10 years (or more often if you're at risk) Or, ask your provider about stool tests like a FIT test every year or Cologuard test every 3 years.  To learn more about your testing options, visit:   .  For help making a decision, visit:   https://bit.ly/ly26713.  Prostate cancer screening test: If you have a prostate, ask your care team if a prostate cancer screening test (PSA) at age 55 is right for you.  Lung cancer screening: If you are a current or former smoker ages 50 to 80, ask your care team if ongoing lung cancer screenings are right for you.  For informational purposes only. Not to replace the advice of your health care provider. Copyright   2023 [facility]. All rights reserved. Clinically reviewed by the [facility] Transitions Program. [employer] 052768 - REV 01/24.  Preventing Falls: Care Instructions  Injuries and health problems such as trouble walking or poor eyesight can increase your risk of falling. So can some medicines. But there are things you can do to help  "prevent falls. You can exercise to get stronger. You can also arrange your home to make it safer.    Talk to your doctor about the medicines you take. Ask if any of them increase the risk of falls and whether they can be changed or stopped.   Try to exercise regularly. It can help improve your strength and balance. This can help lower your risk of falling.         Practice fall safety and prevention.   Wear low-heeled shoes that fit well and give your feet good support. Talk to your doctor if you have foot problems that make this hard.  Carry a cellphone or wear a medical alert device that you can use to call for help.  Use stepladders instead of chairs to reach high objects. Don't climb if you're at risk for falls. Ask for help, if needed.  Wear the correct eyeglasses, if you need them.        Make your home safer.   Remove rugs, cords, clutter, and furniture from walkways.  Keep your house well lit. Use night-lights in hallways and bathrooms.  Install and use sturdy handrails on stairways.  Wear nonskid footwear, even inside. Don't walk barefoot or in socks without shoes.        Be safe outside.   Use handrails, curb cuts, and ramps whenever possible.  Keep your hands free by using a shoulder bag or backpack.  Try to walk in well-lit areas. Watch out for uneven ground, changes in pavement, and debris.  Be careful in the winter. Walk on the grass or gravel when sidewalks are slippery. Use de-icer on steps and walkways. Add non-slip devices to shoes.    Put grab bars and nonskid mats in your shower or tub and near the toilet. Try to use a shower chair or bath bench when bathing.   Get into a tub or shower by putting in your weaker leg first. Get out with your strong side first. Have a phone or medical alert device in the bathroom with you.   Where can you learn more?  Go to https://www.GEOLIDwise.net/patiented  Enter G117 in the search box to learn more about \"Preventing Falls: Care Instructions.\"  Current as of: " July 31, 2024  Content Version: 14.4    8217-4739 [employer].   Care instructions adapted under license by your healthcare professional. If you have questions about a medical condition or this instruction, always ask your healthcare professional. [employer] disclaims any warranty or liability for your use of this information.

## 2025-05-20 ENCOUNTER — RESULTS FOLLOW-UP (OUTPATIENT)
Dept: FAMILY MEDICINE | Facility: CLINIC | Age: 70
End: 2025-05-20
Payer: MEDICARE

## 2025-05-20 DIAGNOSIS — E55.9 VITAMIN D DEFICIENCY: ICD-10-CM

## 2025-05-20 DIAGNOSIS — E78.2 MIXED HYPERLIPIDEMIA: Primary | ICD-10-CM

## 2025-05-20 LAB
ALBUMIN SERPL BCG-MCNC: 4.4 G/DL (ref 3.5–5.2)
ALP SERPL-CCNC: 61 U/L (ref 40–150)
ALT SERPL W P-5'-P-CCNC: 25 U/L (ref 0–50)
ANION GAP SERPL CALCULATED.3IONS-SCNC: 10 MMOL/L (ref 7–15)
AST SERPL W P-5'-P-CCNC: 30 U/L (ref 0–45)
BILIRUB SERPL-MCNC: 0.4 MG/DL
BUN SERPL-MCNC: 24.5 MG/DL (ref 8–23)
CALCIUM SERPL-MCNC: 9.7 MG/DL (ref 8.8–10.4)
CHLORIDE SERPL-SCNC: 103 MMOL/L (ref 98–107)
CHOLEST SERPL-MCNC: 230 MG/DL
CREAT SERPL-MCNC: 0.78 MG/DL (ref 0.51–0.95)
EGFRCR SERPLBLD CKD-EPI 2021: 82 ML/MIN/1.73M2
FASTING STATUS PATIENT QL REPORTED: ABNORMAL
FASTING STATUS PATIENT QL REPORTED: ABNORMAL
GLUCOSE SERPL-MCNC: 88 MG/DL (ref 70–99)
HCO3 SERPL-SCNC: 26 MMOL/L (ref 22–29)
HDLC SERPL-MCNC: 68 MG/DL
LDLC SERPL CALC-MCNC: 148 MG/DL
NONHDLC SERPL-MCNC: 162 MG/DL
POTASSIUM SERPL-SCNC: 3.7 MMOL/L (ref 3.4–5.3)
PROT SERPL-MCNC: 6.8 G/DL (ref 6.4–8.3)
SODIUM SERPL-SCNC: 139 MMOL/L (ref 135–145)
TRIGL SERPL-MCNC: 69 MG/DL
TSH SERPL DL<=0.005 MIU/L-ACNC: 1.06 UIU/ML (ref 0.3–4.2)

## 2025-05-21 ENCOUNTER — TELEPHONE (OUTPATIENT)
Dept: GASTROENTEROLOGY | Facility: CLINIC | Age: 70
End: 2025-05-21
Payer: MEDICARE

## 2025-05-21 NOTE — TELEPHONE ENCOUNTER
"Endoscopy Scheduling Screen    Caller: patient    Have you had any respiratory illness or flu-like symptoms in the last 10 days?  No    What is your communication preference for Instructions and/or Bowel Prep?   MyChart    What insurance is in the chart?  Other:  MEDICARE    Ordering/Referring Provider: Rozina Salazar APRN CNP     (If ordering provider performs procedure, schedule with ordering provider unless otherwise instructed. )    BMI: Estimated body mass index is 32.56 kg/m  as calculated from the following:    Height as of 5/19/25: 1.549 m (5' 1\").    Weight as of 5/19/25: 78.2 kg (172 lb 4.8 oz).     Sedation Ordered  moderate sedation.   If patient BMI > 50 do not schedule in [facility].    If patient BMI > 45 do not schedule at [facility].    Are you taking methadone or Suboxone?  NO, No RN review required.    Have you been diagnosed and are being treated for severe PTSD or severe anxiety?  NO, No RN review required.    Are you taking any prescription medications for pain 3 or more times per week?   NO, No RN review required.    Do you have a history of malignant hyperthermia?  No    (Females) Are you currently pregnant?   No     Have you been diagnosed or told you have pulmonary hypertension?   No    Do you have an LVAD?  No    Have you been told you have moderate to severe sleep apnea?  Yes. Do you use a CPAP? Yes Where is the patient located?. (RN Review required for scheduling unless scheduling in Hospital.)     Have you been told you have COPD, asthma, or any other lung disease?  No    Has your doctor ordered any cardiac tests like echo, angiogram, stress test, ablation, or EKG, that you have not completed yet?  No    Do you  have a history of any heart conditions?  No     Have you ever had or are you waiting for an organ transplant?  No. Continue scheduling, no site restrictions.    Have you had a stroke or transient ischemic attack (TIA aka \"mini stroke\") in the last 2 years?   No.    Have you been diagnosed " "with or been told you have cirrhosis of the liver?   No.    Are you currently on dialysis?   No    Do you need assistance transferring?   No    BMI: Estimated body mass index is 32.56 kg/m  as calculated from the following:    Height as of 5/19/25: 1.549 m (5' 1\").    Weight as of 5/19/25: 78.2 kg (172 lb 4.8 oz).     Is patients BMI > 40 and scheduling location [facility]?  No    Do you take an injectable or oral medication for weight loss or diabetes (excluding insulin)?  No    Do you take the medication Naltrexone?  No    Do you take blood thinners?  No       Prep   Are you currently on dialysis or do you have chronic kidney disease?  No    Do you have a diagnosis of diabetes?  No    Do you have a diagnosis of cystic fibrosis (CF)?  No    On a regular basis do you go 3 -5 days between bowel movements?  No    BMI > 40?  No    Preferred Pharmacy:      [id number]  [place], [place] - [address]  [address]  [facility] [address]  Phone: [phone] Fax: [phone]      Final Scheduling Details     Procedure scheduled  Colonoscopy    Surgeon:  KAYE     Date of procedure:  6/26/25     Pre-OP / PAC:   No - Not required for this site.    Location  [facility] - Patient preference.    Sedation   Moderate Sedation - Per order.      Patient Reminders:   You will receive a call from a Nurse to review instructions and health history.  This assessment must be completed prior to your procedure.  Failure to complete the Nurse assessment may result in the procedure being cancelled.      On the day of your procedure, please designate an adult(s) who can drive you home stay with you for the next 24 hours. The medicines used in the exam will make you sleepy. You will not be able to drive.      You cannot take public transportation, ride share services, or non-medical taxi service without a responsible caregiver.  Medical transport services are allowed with the requirement that a responsible caregiver will receive you at " your destination.  We require that drivers and caregivers are confirmed prior to your procedure.

## 2025-05-22 RX ORDER — LIDOCAINE 40 MG/G
CREAM TOPICAL
Status: CANCELLED | OUTPATIENT
Start: 2025-05-22

## 2025-05-22 RX ORDER — ONDANSETRON 2 MG/ML
4 INJECTION INTRAMUSCULAR; INTRAVENOUS
Status: CANCELLED | OUTPATIENT
Start: 2025-05-22

## 2025-05-27 NOTE — TELEPHONE ENCOUNTER
Labs ordered. Please notify patient that she can make a lab only appointment to have these completed.

## 2025-05-28 ENCOUNTER — TELEPHONE (OUTPATIENT)
Dept: GASTROENTEROLOGY | Facility: CLINIC | Age: 70
End: 2025-05-28
Payer: MEDICARE

## 2025-05-28 NOTE — TELEPHONE ENCOUNTER
Caller: Steffi Klein      Reason for Reschedule/Cancellation (please be detailed, any staff messages or encounters to note?):   Conflict with work schedule    Did you cancel or rescheduled an EUS procedure? No.    Is screening questionnaire older than 3 months from the reschedule date.   If Yes, please complete screening questionnaire. No    Prior to reschedule please review:  Ordering Provider: Rozina Salazar APRN CNP   Sedation Determined: cs  Does patient have any ASC Exclusions, please identify?: Y,  JEROD    Notes on Cancelled Procedure:  Procedure: Lower Endoscopy [Colonoscopy]   Date: 6/26  Location: [facility]; [facility] [address] [place] [address]  Surgeon: Torsten    Rescheduled: Yes,   Procedure: Lower Endoscopy [Colonoscopy]    Date: 7/18   Location: [facility]; [address] [place] [address]   Surgeon: Palak   Sedation Level Scheduled  cs ,  Reason for Sedation Level per order   Instructions updated and sent: y     Does patient need PAC or Pre -Op Rescheduled? : n

## 2025-06-04 ENCOUNTER — ANCILLARY PROCEDURE (OUTPATIENT)
Dept: MAMMOGRAPHY | Facility: CLINIC | Age: 70
End: 2025-06-04
Payer: MEDICARE

## 2025-06-04 DIAGNOSIS — Z12.31 ENCOUNTER FOR SCREENING MAMMOGRAM FOR MALIGNANT NEOPLASM OF BREAST: ICD-10-CM

## 2025-06-04 DIAGNOSIS — Z00.00 ENCOUNTER FOR MEDICARE ANNUAL WELLNESS EXAM: ICD-10-CM

## 2025-06-04 PROCEDURE — 77067 SCR MAMMO BI INCL CAD: CPT | Mod: TC | Performed by: RADIOLOGY

## 2025-06-04 PROCEDURE — 77063 BREAST TOMOSYNTHESIS BI: CPT | Mod: TC | Performed by: RADIOLOGY

## 2025-06-22 DIAGNOSIS — G62.9 NEUROPATHY: ICD-10-CM

## 2025-06-23 ENCOUNTER — LAB (OUTPATIENT)
Dept: LAB | Facility: CLINIC | Age: 70
End: 2025-06-23
Payer: MEDICARE

## 2025-06-23 DIAGNOSIS — E78.2 MIXED HYPERLIPIDEMIA: ICD-10-CM

## 2025-06-23 DIAGNOSIS — E55.9 VITAMIN D DEFICIENCY: ICD-10-CM

## 2025-06-23 LAB
CHOLEST SERPL-MCNC: 222 MG/DL
FASTING STATUS PATIENT QL REPORTED: YES
HDLC SERPL-MCNC: 66 MG/DL
LDLC SERPL CALC-MCNC: 147 MG/DL
NONHDLC SERPL-MCNC: 156 MG/DL
TRIGL SERPL-MCNC: 45 MG/DL
VIT D+METAB SERPL-MCNC: 49 NG/ML (ref 20–50)

## 2025-06-23 PROCEDURE — 36415 COLL VENOUS BLD VENIPUNCTURE: CPT

## 2025-06-23 PROCEDURE — 80061 LIPID PANEL: CPT

## 2025-06-23 PROCEDURE — 82306 VITAMIN D 25 HYDROXY: CPT

## 2025-06-23 RX ORDER — TOPIRAMATE 100 MG/1
100 TABLET, FILM COATED ORAL DAILY
Qty: 90 TABLET | Refills: 2 | OUTPATIENT
Start: 2025-06-23

## 2025-06-24 ENCOUNTER — RESULTS FOLLOW-UP (OUTPATIENT)
Dept: FAMILY MEDICINE | Facility: CLINIC | Age: 70
End: 2025-06-24

## 2025-07-18 ENCOUNTER — HOSPITAL ENCOUNTER (OUTPATIENT)
Facility: CLINIC | Age: 70
Discharge: HOME | End: 2025-07-18
Attending: INTERNAL MEDICINE | Admitting: INTERNAL MEDICINE
Payer: MEDICARE

## 2025-07-18 VITALS
WEIGHT: 161.2 LBS | HEART RATE: 54 BPM | SYSTOLIC BLOOD PRESSURE: 96 MMHG | BODY MASS INDEX: 29.66 KG/M2 | DIASTOLIC BLOOD PRESSURE: 63 MMHG | HEIGHT: 62 IN | OXYGEN SATURATION: 97 % | RESPIRATION RATE: 16 BRPM | TEMPERATURE: 97.5 F

## 2025-07-18 LAB — COLONOSCOPY: NORMAL

## 2025-07-18 PROCEDURE — 250N000011 HC RX IP 250 OP 636: Performed by: INTERNAL MEDICINE

## 2025-07-18 PROCEDURE — G0121 COLON CA SCRN NOT HI RSK IND: HCPCS | Performed by: INTERNAL MEDICINE

## 2025-07-18 PROCEDURE — G0500 MOD SEDAT ENDO SERVICE >5YRS: HCPCS | Performed by: INTERNAL MEDICINE

## 2025-07-18 RX ORDER — EPINEPHRINE 1 MG/ML
0.1 INJECTION, SOLUTION, CONCENTRATE INTRAVENOUS
Status: DISCONTINUED | OUTPATIENT
Start: 2025-07-18 | End: 2025-07-18 | Stop reason: HOSPADM

## 2025-07-18 RX ORDER — PROCHLORPERAZINE MALEATE 5 MG/1
5 TABLET ORAL EVERY 6 HOURS PRN
Status: DISCONTINUED | OUTPATIENT
Start: 2025-07-18 | End: 2025-07-18 | Stop reason: HOSPADM

## 2025-07-18 RX ORDER — LIDOCAINE 40 MG/G
CREAM TOPICAL
Status: DISCONTINUED | OUTPATIENT
Start: 2025-07-18 | End: 2025-07-18 | Stop reason: HOSPADM

## 2025-07-18 RX ORDER — ATROPINE SULFATE 0.1 MG/ML
1 INJECTION INTRAVENOUS
Status: DISCONTINUED | OUTPATIENT
Start: 2025-07-18 | End: 2025-07-18 | Stop reason: HOSPADM

## 2025-07-18 RX ORDER — ONDANSETRON 2 MG/ML
4 INJECTION INTRAMUSCULAR; INTRAVENOUS EVERY 6 HOURS PRN
Status: DISCONTINUED | OUTPATIENT
Start: 2025-07-18 | End: 2025-07-18 | Stop reason: HOSPADM

## 2025-07-18 RX ORDER — FLUMAZENIL 0.1 MG/ML
0.2 INJECTION, SOLUTION INTRAVENOUS
Status: DISCONTINUED | OUTPATIENT
Start: 2025-07-18 | End: 2025-07-18 | Stop reason: HOSPADM

## 2025-07-18 RX ORDER — DIPHENHYDRAMINE HYDROCHLORIDE 50 MG/ML
25-50 INJECTION, SOLUTION INTRAMUSCULAR; INTRAVENOUS
Status: DISCONTINUED | OUTPATIENT
Start: 2025-07-18 | End: 2025-07-18 | Stop reason: HOSPADM

## 2025-07-18 RX ORDER — NALOXONE HYDROCHLORIDE 0.4 MG/ML
0.2 INJECTION, SOLUTION INTRAMUSCULAR; INTRAVENOUS; SUBCUTANEOUS
Status: DISCONTINUED | OUTPATIENT
Start: 2025-07-18 | End: 2025-07-18 | Stop reason: HOSPADM

## 2025-07-18 RX ORDER — NALOXONE HYDROCHLORIDE 0.4 MG/ML
0.4 INJECTION, SOLUTION INTRAMUSCULAR; INTRAVENOUS; SUBCUTANEOUS
Status: DISCONTINUED | OUTPATIENT
Start: 2025-07-18 | End: 2025-07-18 | Stop reason: HOSPADM

## 2025-07-18 RX ORDER — ONDANSETRON 4 MG/1
4 TABLET, ORALLY DISINTEGRATING ORAL EVERY 6 HOURS PRN
Status: DISCONTINUED | OUTPATIENT
Start: 2025-07-18 | End: 2025-07-18 | Stop reason: HOSPADM

## 2025-07-18 RX ORDER — ONDANSETRON 2 MG/ML
4 INJECTION INTRAMUSCULAR; INTRAVENOUS
Status: DISCONTINUED | OUTPATIENT
Start: 2025-07-18 | End: 2025-07-18 | Stop reason: HOSPADM

## 2025-07-18 RX ORDER — SIMETHICONE 40MG/0.6ML
133 SUSPENSION, DROPS(FINAL DOSAGE FORM)(ML) ORAL
Status: DISCONTINUED | OUTPATIENT
Start: 2025-07-18 | End: 2025-07-18 | Stop reason: HOSPADM

## 2025-07-18 RX ORDER — FENTANYL CITRATE 50 UG/ML
25-100 INJECTION, SOLUTION INTRAMUSCULAR; INTRAVENOUS EVERY 5 MIN PRN
Refills: 0 | Status: DISCONTINUED | OUTPATIENT
Start: 2025-07-18 | End: 2025-07-18 | Stop reason: HOSPADM

## 2025-07-18 RX ADMIN — MIDAZOLAM 2 MG: 1 INJECTION INTRAMUSCULAR; INTRAVENOUS at 09:41

## 2025-07-18 RX ADMIN — FENTANYL CITRATE 100 MCG: 50 INJECTION, SOLUTION INTRAMUSCULAR; INTRAVENOUS at 09:41

## 2025-07-18 ASSESSMENT — ACTIVITIES OF DAILY LIVING (ADL)
ADLS_ACUITY_SCORE: 41
ADLS_ACUITY_SCORE: 41

## 2025-07-18 NOTE — H&P
Pre-Endoscopy History and Physical     Steffi Klein MRN# [id number]   YOB: 1955 Age: 70 year old     Date of Procedure: 7/18/2025  Primary care provider: Rozina Salazar  Type of Endoscopy: Colonoscopy with possible biopsy, possible polypectomy  Reason for Procedure: screen  Type of Anesthesia Anticipated: Conscious Sedation    HPI:    Steffi is a 70 year old female who will be undergoing the above procedure.      A history and physical has been performed. The patient's medications and allergies have been reviewed. The risks and benefits of the procedure and the sedation options and risks were discussed with the patient.  All questions were answered and informed consent was obtained.      She denies a personal or family history of anesthesia complications or bleeding disorders.     Patient Active Problem List   Diagnosis    Esophageal reflux    Anxiety    CARDIOVASCULAR SCREENING; LDL GOAL LESS THAN 160    Neuropathy    Hypothyroidism    Rosacea    Onychomycosis    Macrocytosis    Bradycardia    Abnormal pulse oximetry    JEROD (obstructive sleep apnea)    Tinnitus, bilateral    Rotator cuff syndrome, left    Screen for colon cancer    Routine general medical examination at a health care facility    Osteoarthritis of left knee, unspecified osteoarthritis type    Special screening for malignant neoplasms, colon    Encounter for screening mammogram for breast cancer    COVID-19 vaccine dose declined    Mixed hyperlipidemia        Past Medical History:   Diagnosis Date    Abnormal pulse oximetry 7/9/2018    Anxiety 12/28/2009    Bradycardia 7/9/2018    CARDIOVASCULAR SCREENING; LDL GOAL LESS THAN 160 10/31/2010    Chronic pain     left wrist    Distal radius fracture 1/28/2013    Esophageal reflux 7/2/2006    Hypotension, unspecified hypotension type 7/9/2018    Hypothyroidism 7/2/2014    Irritable bowel syndrome     Lumbago     Macrocytosis 4/22/2018    Neuropathy 6/30/2014    R foot post op     Non  morbid obesity due to excess calories 2017    Obesity 2015    Onychomycosis 2017    JEROD (obstructive sleep apnea) 10/30/2018    Osteoarthritis of left knee, unspecified osteoarthritis type 10/25/2023    Osteoporosis 2012    Reflux esophagitis     Restless legs syndrome (RLS)     Rosacea 2015    Rotator cuff syndrome, left 3/9/2022    Skin erosion 3/9/2022    Squamous cell carcinoma of shoulder, left     Wrist pain 2013        Past Surgical History:   Procedure Laterality Date    COLONOSCOPY  2007    D & C      ESOPHAGOSCOPY, GASTROSCOPY, DUODENOSCOPY (EGD), COMBINED  10/24/2014     [facility]    ESOPHAGOSCOPY, GASTROSCOPY, DUODENOSCOPY (EGD), COMBINED N/A 10/24/2014    Procedure: COMBINED ESOPHAGOSCOPY, GASTROSCOPY, DUODENOSCOPY (EGD);  Surgeon: Black Alva MD;  Location: [facility]    HC REMOVAL DEEP IMPLANT Left 2018    [facility]    OPEN REDUCTION INTERNAL FIXATION WRIST  12/3/2012    Procedure: OPEN REDUCTION INTERNAL FIXATION WRIST;  Left Wrist Open Reduction Internal Fixation       ORTHOPEDIC SURGERY      right ankle surgery    skin cancer excision      TONSILLECTOMY  as a child     ZZC NONSPECIFIC PROCEDURE      3 NSVDs       Social History     Tobacco Use    Smoking status: Former     Current packs/day: 0.00     Average packs/day: 0.5 packs/day for 7.0 years (3.5 ttl pk-yrs)     Types: Cigarettes     Start date: 1970     Quit date: 1977     Years since quittin.6     Passive exposure: Past    Smokeless tobacco: Never    Tobacco comments:     quit    Substance Use Topics    Alcohol use: Yes     Comment: socially       Family History   Problem Relation Age of Onset    Cancer Father         lung and brain cancer,  at age of 69    Other Cancer Father         Inoperable Brain Cancer    Cancer Mother         lung cancer, surgically resected    Arthritis Mother         has had knee  "surgeries    Alzheimer Disease Mother     Thyroid Disease Mother     Obesity Mother     Cancer Paternal Aunt          from breast CA    Alzheimer Disease Maternal Grandfather     Diabetes Other         Type 1    Diabetes Daughter         Type 1    Breast Cancer Other         Aunt (my Dad's sister)    Breast Cancer Cousin     Breast Cancer Cousin     Breast Cancer Niece         Bracket 1 breast cancer    Colon Cancer Other          from colon cancer    Anxiety Disorder Daughter        Prior to Admission medications    Medication Sig Start Date End Date Taking? Authorizing Provider   cyanocobalamin (VITAMIN B-12) 1000 MCG tablet Take 1 tablet (1,000 mcg) by mouth daily 23   Raul Newman MD   doxycycline hyclate (VIBRAMYCIN) 100 MG capsule Take 1 capsule (100 mg) by mouth daily. 25   Rozina Salazar APRN CNP   Emollient (CERAVE) LOTN Externally apply  topically.    Reported, Patient   levothyroxine (SYNTHROID/LEVOTHROID) 75 MCG tablet Take 1 tablet (75 mcg) by mouth daily. 25   Raul Newman MD   meloxicam (MOBIC) 7.5 MG tablet Take 1 tablet (7.5 mg) by mouth daily. 25   Rozina Salazar APRN CNP   omeprazole (PRILOSEC) 20 MG DR capsule Take 1 capsule (20 mg) by mouth daily. 25   Rozina Salazar APRN CNP   topiramate (TOPAMAX) 100 MG tablet Take 1 tablet (100 mg) by mouth daily. 25   Rozina Salazar APRN CNP   ZYRTEC 10 MG OR TABS ONE TABLET DAILY 10/12/09   Sj Larkin MD       Allergies   Allergen Reactions    Pcn [Penicillins] Rash     Gets a lot of yeast build-up when on antibiotics         REVIEW OF SYSTEMS:   5 point ROS negative except as noted above in HPI, including Gen., Resp., CV, GI &  system review.    PHYSICAL EXAM:   LMP 2007 (Approximate)  Estimated body mass index is 32.56 kg/m  as calculated from the following:    Height as of 25: 1.549 m (5' 1\").    Weight as of 25: 78.2 kg (172 lb 4.8 oz).   GENERAL APPEARANCE: alert, and " oriented  MENTAL STATUS: alert  AIRWAY EXAM: Mallampatti Class I (visualization of the soft palate, fauces, uvula, anterior and posterior pillars)  RESP: lungs clear to auscultation - no rales, rhonchi or wheezes  CV: regular rates and rhythm  DIAGNOSTICS:    Not indicated    IMPRESSION   ASA Class 2 - Mild systemic disease    PLAN:   Plan for Colonoscopy with possible biopsy, possible polypectomy. We discussed the risks, benefits and alternatives and the patient wished to proceed.    The above has been forwarded to the consulting provider.      Signed Electronically by: Black Cid MD  July 18, 2025

## 2025-08-14 ENCOUNTER — HOSPITAL ENCOUNTER (OUTPATIENT)
Dept: CARDIOLOGY | Facility: CLINIC | Age: 70
End: 2025-08-14
Attending: FAMILY MEDICINE
Payer: MEDICARE

## 2025-08-14 DIAGNOSIS — E78.00 PURE HYPERCHOLESTEROLEMIA, UNSPECIFIED: ICD-10-CM

## 2025-08-14 PROCEDURE — 75571 CT HRT W/O DYE W/CA TEST: CPT

## 2025-08-19 ENCOUNTER — HOSPITAL ENCOUNTER (OUTPATIENT)
Dept: CT IMAGING | Facility: CLINIC | Age: 70
Discharge: HOME | End: 2025-08-19
Attending: FAMILY MEDICINE
Payer: MEDICARE

## 2025-08-19 DIAGNOSIS — K76.9 LIVER DISEASE, UNSPECIFIED: ICD-10-CM

## 2025-08-19 DIAGNOSIS — K76.9 LIVER LESION: ICD-10-CM

## 2025-08-19 PROCEDURE — 250N000011 HC RX IP 250 OP 636

## 2025-08-19 PROCEDURE — 250N000009 HC RX 250

## 2025-08-19 PROCEDURE — 74170 CT ABD WO CNTRST FLWD CNTRST: CPT

## 2025-08-19 RX ORDER — IOPAMIDOL 755 MG/ML
79 INJECTION, SOLUTION INTRAVASCULAR ONCE
Status: COMPLETED | OUTPATIENT
Start: 2025-08-19 | End: 2025-08-19

## 2025-08-19 RX ADMIN — IOPAMIDOL 79 ML: 755 INJECTION, SOLUTION INTRAVENOUS at 08:31

## 2025-08-19 RX ADMIN — SODIUM CHLORIDE 66 ML: 9 INJECTION, SOLUTION INTRAVENOUS at 08:32

## (undated) DEVICE — KIT ENDO TURNOVER/PROCEDURE W/CLEAN A SCOPE LINERS 103888

## (undated) RX ORDER — FENTANYL CITRATE 50 UG/ML
INJECTION, SOLUTION INTRAMUSCULAR; INTRAVENOUS
Status: DISPENSED
Start: 2025-07-18